# Patient Record
Sex: FEMALE | Race: WHITE | Employment: FULL TIME | URBAN - METROPOLITAN AREA
[De-identification: names, ages, dates, MRNs, and addresses within clinical notes are randomized per-mention and may not be internally consistent; named-entity substitution may affect disease eponyms.]

---

## 2017-01-05 ENCOUNTER — VIRTUAL VISIT (OUTPATIENT)
Dept: FAMILY MEDICINE | Facility: OTHER | Age: 37
End: 2017-01-05

## 2017-01-05 ENCOUNTER — MYC REFILL (OUTPATIENT)
Dept: OBGYN | Facility: CLINIC | Age: 37
End: 2017-01-05

## 2017-01-05 ENCOUNTER — TELEPHONE (OUTPATIENT)
Dept: FAMILY MEDICINE | Facility: CLINIC | Age: 37
End: 2017-01-05

## 2017-01-05 DIAGNOSIS — N39.0 POSTCOITAL UTI: ICD-10-CM

## 2017-01-05 DIAGNOSIS — N30.00 ACUTE CYSTITIS WITHOUT HEMATURIA: Primary | ICD-10-CM

## 2017-01-05 RX ORDER — CEPHALEXIN 500 MG/1
500 CAPSULE ORAL 2 TIMES DAILY
Qty: 14 CAPSULE | Refills: 0 | Status: SHIPPED | OUTPATIENT
Start: 2017-01-05 | End: 2017-01-12

## 2017-01-05 RX ORDER — NITROFURANTOIN MACROCRYSTALS 50 MG/1
CAPSULE ORAL
Qty: 30 CAPSULE | Status: SHIPPED | OUTPATIENT
Start: 2017-01-05 | End: 2017-11-22

## 2017-01-05 NOTE — TELEPHONE ENCOUNTER
Patient is trying to get pregnant. She was prescribed bactrim this morning for a UTI. She is wondering if this is safe for her to take or should she be using something different. Please advise. Thank you.  Nikki Case RN

## 2017-01-05 NOTE — TELEPHONE ENCOUNTER
Sharlene and  are trying to get pregnant.  She currently has UTI and went on zipnosis this morning.  They prescribed her bactrim, but pharmacy didn't think that would be a good medication while she is trying to get pregnant.  Please call and assess. Thank you..Joslyn Machado

## 2017-01-05 NOTE — TELEPHONE ENCOUNTER
Message from [employer]:  Original authorizing provider: MD Sharlene Cerna would like a refill of the following medications:  nitrofurantoin (MACRODANTIN) 50 MG capsule [Anna Saldaña MD]    Preferred pharmacy: [facility], [place] - [address]    Comment:  I ran out of my prior prescription and still prone to UTI's, so it would be great to get a preventative.

## 2017-01-05 NOTE — PROGRESS NOTES
"Date:   Clinician: [facility]  Clinician NPI: [id number]  Patient: Sharlene Natarajan  Patient : 1980  Patient Address: [address] [place] [address]  Patient Phone: [phone]  Visit Protocol: UTI  Patient Summary:  Sharlene is a 36 year old ( : 1980 ) female who initiated a Zip for a presumed bladder infection. When asked the question \"Do you have a [place] primary care physician?\", Sharlene responded \"Yes\".    Her symptoms began 2 days ago and consist of dysuria, hesitation, urinary frequency, and urgency.   Symptom Details   Urinary Frequency: Several times each hour    She denies urinary incontinence, fever, chills, loss of appetite, nausea, vomiting, abdominal pain, recent antibiotic use, foul smelling urine, hematuria, vaginal discharge, and flank pain. Sharlene has never had kidney stones. She has not been hospitalized, been a patient in a nursing home, or had a catheter in the past two weeks. She denies risk factors for sexually transmitted infections.   Sharlene has not had any UTIs in the past 12 months. Her current symptoms are similar to the previous UTI symptoms. She took an antibiotic for her last infection but does not remember which one.    Sharlene typically gets yeast infections when she takes antibiotics.  She states she is not pregnant and denies breastfeeding. She has menstruated in the past month.   She does NOT smoke or use smokeless tobacco.  MEDICATIONS:  No current medications   , ALLERGIES:   penicillin/amoxicillin/augmentin    Clinician Response:  Dear Sharlene,  Based on the information you have provided, you likely have a bladder infection, also called an acute urinary tract infection (UTI).   To treat your infection, I am prescribing:   Bactrim DS. Swallow one (1) tablet twice a day for 3 days to treat your bladder infection. Continue taking the tablets even if you feel better before all the medication is gone. There is no " refill with this prescription.   Some people develop allergies to antibiotics. If you notice a new rash, significant swelling, or difficulty breathing, stop the medication immediately and go into a clinic for physical evaluation.   To help treat your current UTI and prevent future occurrences, remember to:     Drink 8-10, 8-ounce glasses of water daily.    Urinate after sexual intercourse.    Wipe front to back after using the bathroom.     Some women may develop a yeast infection as a side effect of taking antibiotics. Because you noted that you typically get yeast infections when you are taking antibiotics, I am also prescribing:   Fluconazole (Diflucan) 150 mg oral tablet. Take this medication only if you notice symptoms of a yeast infection (vaginal discharge that is white, thick, and odorless). There are no refills with this prescription.   You should visit a clinic for a follow-up visit if your symptoms do not improve in 1-2 days or if you experience another urinary tract infection soon after completing this treatment.  If you become pregnant during this course of treatment, stop taking the medication and contact your primary care clinician.   Diagnosis: Acute Uncomplicated Bladder Infection  Diagnosis ICD: N39.0  Prescription: sulfamethoxazole-TMP DS (Bactrim DS) 800-160mg oral tablet 6 tablets, 3 days supply. Take one tablet by mouth two times a day for 3 days. Refills: 0, Refill as needed: no, Allow substitutions: yes  Prescription: fluconazole (Diflucan) 150mg oral tablet 1 tablet, 1 days supply. Take one tablet by mouth one time a day for 1 day. Refills: 0, Refill as needed: no, Allow substitutions: yes  Prescription Sent At: January 05 07:29:22, 2017  Pharmacy: [facility] [place] - [phone] - [address], [place] [address]

## 2017-01-05 NOTE — TELEPHONE ENCOUNTER
If there is the possibility that she could be pregnant than I would avoid bactrim and use something like keflex. However it will be hard to know what is the most effective antbiotic given we will not have a urine sample or culture.   I sent in a script for keflex to use in place of bactrim. However it is very important that if symptoms do not seem to improve she come to clinic to be seen so that we can collect a urine and make sure we are treating it with the correct medications  Tawanna

## 2017-01-10 ENCOUNTER — OFFICE VISIT (OUTPATIENT)
Dept: FAMILY MEDICINE | Facility: CLINIC | Age: 37
End: 2017-01-10
Payer: COMMERCIAL

## 2017-01-10 VITALS
SYSTOLIC BLOOD PRESSURE: 129 MMHG | WEIGHT: 165 LBS | DIASTOLIC BLOOD PRESSURE: 74 MMHG | HEIGHT: 66 IN | HEART RATE: 82 BPM | BODY MASS INDEX: 26.52 KG/M2 | TEMPERATURE: 99.1 F

## 2017-01-10 DIAGNOSIS — Z13.220 NEED FOR LIPID SCREENING: ICD-10-CM

## 2017-01-10 DIAGNOSIS — G57.61 MORTON'S NEURALGIA, RIGHT: ICD-10-CM

## 2017-01-10 DIAGNOSIS — N30.00 ACUTE CYSTITIS WITHOUT HEMATURIA: ICD-10-CM

## 2017-01-10 DIAGNOSIS — R10.32 LLQ ABDOMINAL PAIN: Primary | ICD-10-CM

## 2017-01-10 LAB
ALBUMIN SERPL-MCNC: 4 G/DL (ref 3.4–5)
ALBUMIN UR-MCNC: NEGATIVE MG/DL
ALP SERPL-CCNC: 60 U/L (ref 40–150)
ALT SERPL W P-5'-P-CCNC: 20 U/L (ref 0–50)
ANION GAP SERPL CALCULATED.3IONS-SCNC: 9 MMOL/L (ref 3–14)
APPEARANCE UR: CLEAR
AST SERPL W P-5'-P-CCNC: 14 U/L (ref 0–45)
BACTERIA #/AREA URNS HPF: ABNORMAL /HPF
BASOPHILS # BLD AUTO: 0 10E9/L (ref 0–0.2)
BASOPHILS NFR BLD AUTO: 0.5 %
BETA HCG QUAL IFA URINE: NEGATIVE
BILIRUB SERPL-MCNC: 0.4 MG/DL (ref 0.2–1.3)
BILIRUB UR QL STRIP: NEGATIVE
BUN SERPL-MCNC: 11 MG/DL (ref 7–30)
CALCIUM SERPL-MCNC: 8.9 MG/DL (ref 8.5–10.1)
CHLORIDE SERPL-SCNC: 100 MMOL/L (ref 94–109)
CO2 SERPL-SCNC: 25 MMOL/L (ref 20–32)
COLOR UR AUTO: YELLOW
CREAT SERPL-MCNC: 0.7 MG/DL (ref 0.52–1.04)
DIFFERENTIAL METHOD BLD: NORMAL
EOSINOPHIL # BLD AUTO: 0 10E9/L (ref 0–0.7)
EOSINOPHIL NFR BLD AUTO: 0.5 %
ERYTHROCYTE [DISTWIDTH] IN BLOOD BY AUTOMATED COUNT: 12.6 % (ref 10–15)
GFR SERPL CREATININE-BSD FRML MDRD: NORMAL ML/MIN/1.7M2
GLUCOSE SERPL-MCNC: 95 MG/DL (ref 70–99)
GLUCOSE UR STRIP-MCNC: NEGATIVE MG/DL
HCT VFR BLD AUTO: 38.2 % (ref 35–47)
HGB BLD-MCNC: 12.8 G/DL (ref 11.7–15.7)
HGB UR QL STRIP: ABNORMAL
KETONES UR STRIP-MCNC: NEGATIVE MG/DL
LEUKOCYTE ESTERASE UR QL STRIP: NEGATIVE
LIPASE SERPL-CCNC: 148 U/L (ref 73–393)
LYMPHOCYTES # BLD AUTO: 2.3 10E9/L (ref 0.8–5.3)
LYMPHOCYTES NFR BLD AUTO: 30.6 %
MCH RBC QN AUTO: 28.8 PG (ref 26.5–33)
MCHC RBC AUTO-ENTMCNC: 33.5 G/DL (ref 31.5–36.5)
MCV RBC AUTO: 86 FL (ref 78–100)
MONOCYTES # BLD AUTO: 0.6 10E9/L (ref 0–1.3)
MONOCYTES NFR BLD AUTO: 7.7 %
NEUTROPHILS # BLD AUTO: 4.6 10E9/L (ref 1.6–8.3)
NEUTROPHILS NFR BLD AUTO: 60.7 %
NITRATE UR QL: NEGATIVE
NON-SQ EPI CELLS #/AREA URNS LPF: ABNORMAL /LPF
PH UR STRIP: 6.5 PH (ref 5–7)
PLATELET # BLD AUTO: 246 10E9/L (ref 150–450)
POTASSIUM SERPL-SCNC: 4 MMOL/L (ref 3.4–5.3)
PROT SERPL-MCNC: 7.7 G/DL (ref 6.8–8.8)
RBC # BLD AUTO: 4.45 10E12/L (ref 3.8–5.2)
RBC #/AREA URNS AUTO: ABNORMAL /HPF (ref 0–2)
SODIUM SERPL-SCNC: 134 MMOL/L (ref 133–144)
SP GR UR STRIP: 1.01 (ref 1–1.03)
URN SPEC COLLECT METH UR: ABNORMAL
UROBILINOGEN UR STRIP-ACNC: 0.2 EU/DL (ref 0.2–1)
WBC # BLD AUTO: 7.6 10E9/L (ref 4–11)
WBC #/AREA URNS AUTO: ABNORMAL /HPF (ref 0–2)

## 2017-01-10 PROCEDURE — 80053 COMPREHEN METABOLIC PANEL: CPT | Performed by: FAMILY MEDICINE

## 2017-01-10 PROCEDURE — 99214 OFFICE O/P EST MOD 30 MIN: CPT | Performed by: FAMILY MEDICINE

## 2017-01-10 PROCEDURE — 85025 COMPLETE CBC W/AUTO DIFF WBC: CPT | Performed by: FAMILY MEDICINE

## 2017-01-10 PROCEDURE — 36415 COLL VENOUS BLD VENIPUNCTURE: CPT | Performed by: FAMILY MEDICINE

## 2017-01-10 PROCEDURE — 81001 URINALYSIS AUTO W/SCOPE: CPT | Performed by: FAMILY MEDICINE

## 2017-01-10 PROCEDURE — 84703 CHORIONIC GONADOTROPIN ASSAY: CPT | Performed by: FAMILY MEDICINE

## 2017-01-10 PROCEDURE — 83690 ASSAY OF LIPASE: CPT | Performed by: FAMILY MEDICINE

## 2017-01-10 NOTE — NURSING NOTE
"Chief Complaint   Patient presents with     Abdominal Pain       Initial /74 mmHg  Pulse 82  Temp(Src) 99.1  F (37.3  C) (Tympanic)  Ht 5' 6.25\" (1.683 m)  Wt 165 lb (74.844 kg)  BMI 26.42 kg/m2 Estimated body mass index is 26.42 kg/(m^2) as calculated from the following:    Height as of this encounter: 5' 6.25\" (1.683 m).    Weight as of this encounter: 165 lb (74.844 kg).  BP completed using cuff size: lucie Pacheco LPN    "

## 2017-01-10 NOTE — LETTER
[facility]  [address] [facility] [address]  [phone]        January 15, 2018    Sharlene Natarajan  [address]  [facility] [address]              Dear Sharlene Natarajan    This is to remind you that your FASTING LAB is due.    You may call our office at [phone] to schedule an appointment.    Please disregard this notice if you have already had your labs drawn or made an appointment.        Sincerely,        Jackie Olivo MD

## 2017-01-10 NOTE — MR AVS SNAPSHOT
After Visit Summary   1/10/2017    Sharlene Natarajan    MRN: [id number]           Patient Information     Date Of Birth          1980        Visit Information        Provider Department      1/10/2017 9:30 AM aJckie Olivo MD [facility]        Today's Diagnoses     LLQ abdominal pain    -  1     Acute cystitis without hematuria         Need for lipid screening         Babb's neuralgia, right           Care Instructions      What Are Neuromas of the Foot?  The ball of your foot is the bottom part just behind your toes. Bands of tissue (ligaments) connect the bones in the ball of your foot. Nerves run between the bones and underneath the ligaments. When a nerve becomes pinched, this causes it to swell and become painful due to the thickening of the tissue that surrounds the nerve. The painful, swollen nerve is called a neuroma (also called Babb's neuroma).     A neuroma most often occurs at the base of either the third and fourth toes or the second and third toes.   What causes a neuroma?  Wearing tight or high-heeled shoes can cause a neuroma. Shoes that are too narrow or too pointed squeeze the bones in the ball of the foot. Shoes with high heels put extra pressure on the ends of the bones. When the bones are squeezed together, they pinch the nerve that runs between them.  Symptoms  The most common symptom of a neuroma is pain in the ball of the foot between two toes. The pain may be dull or sharp. It may feel as if you have a stone in your shoe. You may also have tingling or numbness in one or both of the toes. Symptoms may occur after you have been walking or standing for a while. Taking off your shoes and rubbing the ball of your foot may decrease or relieve the pain.  Preventing future problems  To prevent a future neuroma, buy shoes with plenty of room across the ball of the foot and in the toes. This keeps the bones from being squeezed together. Wearing low-heeled  shoes (less than 2 inches) also puts less pressure on the bones and nerves in the ball of the foot.     5589-6756 [employer]. [address] [place] [address], [place] [address]. All rights reserved. This information is not intended as a substitute for professional medical care. Always follow your healthcare professional's instructions.              Follow-ups after your visit        Additional Services     ORTHO  REFERRAL       [facility] is referring you to the Orthopedic  Services at [facility].       The  Representative will assist you in the coordination of your Orthopedic and Musculoskeletal Care as prescribed by your physician.    The  Representative will call you within 1 business day to help schedule your appointment, or you may contact the  Representative at:    All areas ~ [phone]     Type of Referral : [facility] / [facility]       Timeframe requested: Routine    Coverage of these services is subject to the terms and limitations of your health insurance plan.  Please call member services at your health plan with any benefit or coverage questions.      If X-rays, CT or MRI's have been performed, please contact the facility where they were done to arrange for , prior to your scheduled appointment.  Please bring this referral request to your appointment and present it to your specialist.                  Future tests that were ordered for you today     Open Future Orders        Priority Expected Expires Ordered    US Abdomen Complete Routine  1/10/2018 1/10/2017    Lipid panel reflex to direct LDL Routine  1/10/2018 1/10/2017            Who to contact     Normal or non-critical lab and imaging results will be communicated to you by MyChart, letter or phone within 4 business days after the clinic has received the results. If you do not hear from us within 7 days, please contact the clinic  "through [employer] or phone. If you have a critical or abnormal lab result, we will notify you by phone as soon as possible.  Submit refill requests through [employer] or call your pharmacy and they will forward the refill request to us. Please allow 3 business days for your refill to be completed.          If you need to speak with a  for additional information , please call: [phone]             Additional Information About Your Visit        [employer] Information     [employer] gives you secure access to your electronic health record. If you see a primary care provider, you can also send messages to your care team and make appointments. If you have questions, please call your primary care clinic.  If you do not have a primary care provider, please call [phone] and they will assist you.        Care EveryWhere ID     This is your Care EveryWhere ID. This could be used by other organizations to access your [facility] medical records  [id number]        Your Vitals Were     Pulse Temperature Height BMI (Body Mass Index)          82 99.1  F (37.3  C) (Tympanic) 5' 6.25\" (1.683 m) 26.42 kg/m2         Blood Pressure from Last 3 Encounters:   01/10/17 129/74   11/11/15 132/75   10/15/15 129/81    Weight from Last 3 Encounters:   01/10/17 165 lb (74.844 kg)   11/11/15 159 lb (72.122 kg)   10/15/15 157 lb (71.215 kg)              We Performed the Following     *UA reflex to Microscopic and Culture ([facility] and [facility] (except [place] and [place])     Beta HCG qual IFA urine     CBC with platelets differential     Comprehensive metabolic panel (BMP + Alb, Alk Phos, ALT, AST, Total. Bili, TP)     Lipase     ORTHO  REFERRAL     Urine Microscopic        Primary Care Provider Office Phone # Fax #    Jackie Olivo [phone]       [facility] [address] [address]        Thank you!     Thank you for choosing [facility]  for " your care. Our goal is always to provide you with excellent care. Hearing back from our patients is one way we can continue to improve our services. Please take a few minutes to complete the written survey that you may receive in the mail after your visit with us. Thank you!             Your Updated Medication List - Protect others around you: Learn how to safely use, store and throw away your medicines at www.disposemymeds.org.          This list is accurate as of: 1/10/17 10:57 AM.  Always use your most recent med list.                   Brand Name Dispense Instructions for use    cephALEXin 500 MG capsule    KEFLEX    14 capsule    Take 1 capsule (500 mg) by mouth 2 times daily for 7 days       chorionic gonadotropin 54606 UNITS IM SOLR    NOVAREL/PREGNYL/PROFASI    76121 Units    Inject 10,000 Units into the muscle once       clomiPHENE 50 MG tablet    CLOMID    5 tablet    Take 2 tablets (100 mg) by mouth daily Day 3-7       ibuprofen 400 MG tablet    ADVIL/MOTRIN    60 tablet    Take 1-2 tablets (400-800 mg) by mouth every 6 hours as needed (cramping.  Begin after ketorolac doses completed.  Max of 3200mg/day)       nitrofurantoin 50 MG capsule    MACRODANTIN    30 capsule    Take 1 capsule after sexual relaitons       Prenat Vit-FePoly-Methylfol-FA 29-1.13-0.4 MG Tabs      Take  by mouth.       PROBIOTIC + OMEGA-3 PO      One day       progesterone 200 MG capsule    PROMETRIUM    60 capsule    Place 1 capsule (200 mg) vaginally 2 times daily

## 2017-01-10 NOTE — PATIENT INSTRUCTIONS
What Are Neuromas of the Foot?  The ball of your foot is the bottom part just behind your toes. Bands of tissue (ligaments) connect the bones in the ball of your foot. Nerves run between the bones and underneath the ligaments. When a nerve becomes pinched, this causes it to swell and become painful due to the thickening of the tissue that surrounds the nerve. The painful, swollen nerve is called a neuroma (also called Babb's neuroma).     A neuroma most often occurs at the base of either the third and fourth toes or the second and third toes.   What causes a neuroma?  Wearing tight or high-heeled shoes can cause a neuroma. Shoes that are too narrow or too pointed squeeze the bones in the ball of the foot. Shoes with high heels put extra pressure on the ends of the bones. When the bones are squeezed together, they pinch the nerve that runs between them.  Symptoms  The most common symptom of a neuroma is pain in the ball of the foot between two toes. The pain may be dull or sharp. It may feel as if you have a stone in your shoe. You may also have tingling or numbness in one or both of the toes. Symptoms may occur after you have been walking or standing for a while. Taking off your shoes and rubbing the ball of your foot may decrease or relieve the pain.  Preventing future problems  To prevent a future neuroma, buy shoes with plenty of room across the ball of the foot and in the toes. This keeps the bones from being squeezed together. Wearing low-heeled shoes (less than 2 inches) also puts less pressure on the bones and nerves in the ball of the foot.     9218-1067 [employer]. [address] [place] [address], [place] [address]. All rights reserved. This information is not intended as a substitute for professional medical care. Always follow your healthcare professional's instructions.

## 2017-01-10 NOTE — PROGRESS NOTES
SUBJECTIVE:                                                    Sharlene Natarajan is a 36 year old female who presents to clinic today for the following health issues:      ABDOMINAL PAIN     Onset: Friday  Night     Description:   Character: Sharp, tender to the touch   Location: left side, midway down on abdomen quadrant  Radiation: None    Intensity: moderate    Progression of Symptoms:  improving and intermittent    Accompanying Signs & Symptoms:  Fever/Chills?: no   Gas/Bloating: YES  Nausea: no   Vomitting: no   Diarrhea?: no   Constipation:no   Dysuria or Hematuria: no    History:   Trauma: no   Previous similar pain: no    Previous tests done: none    Precipitating factors:   Does the pain change with:     Food: no      BM: no     Urination: no     Alleviating factors:      Therapies Tried and outcome:     LMP:  12/15/2016         Diagnosed with UTI last week using zipnosis - finshing antibiotics     Trying to get pregnant        UTI - zipnosis last week - taking keflex   Had typical uti symptoms, they improved and she is feeling better   Now getting a yeast infection -itchy,whitish discharge -  will do vaginal monistat     4 days ago -  random left lower abdominal pains, very sharp and lasted 30seconds  Hurts to press on it   No history of kidney stones   Happening hourly this weekend, not waking her from sleep  Yesterday - 6 times, today - once   No diarrhea  Not related to eating     Feeling a little bloated    Trying to get pregnant - ovulated last week     Numbness in the right 3/4 toes. No other areas of numbness  Ongoing for 6 months.   Doesn't feel it when she wears supportive shoes       Review of systems:  No f/c   No cold symptoms  Normal appetite  No n/v   No rash   No myalgias/arthralgias     Problem list and histories reviewed & adjusted, as indicated.  Additional history: as documented    Patient Active Problem List   Diagnosis     Female infertility     CARDIOVASCULAR SCREENING; LDL GOAL  "LESS THAN 130     Current Outpatient Prescriptions   Medication     nitrofurantoin (MACRODANTIN) 50 MG capsule     cephALEXin (KEFLEX) 500 MG capsule     chorionic gonadotropin (NOVAREL/PREGNYL/PROFASI) 56145 UNITS IM SOLR     Probiotic Product (PROBIOTIC + OMEGA-3 PO)     ibuprofen (ADVIL,MOTRIN) 400 MG tablet     Prenat Vit-FePoly-Methylfol-FA 29-1.13-0.4 MG TABS     clomiPHENE (CLOMID) 50 MG tablet     progesterone (PROMETRIUM) 200 MG capsule     No current facility-administered medications for this visit.        Allergies   Allergen Reactions     Penicillins Hives       /74 mmHg  Pulse 82  Temp(Src) 99.1  F (37.3  C) (Tympanic)  Ht 5' 6.25\" (1.683 m)  Wt 165 lb (74.844 kg)  BMI 26.42 kg/m2  GENERAL - Pt is alert and oriented in no acute distress.  Affect is appropriate. Good eye contact.  NECK - Neck is supple w/o LA or thyromegaly  RESPIRATORY - Clear to auscultation bilaterally.  No wheezing noted  CV - RRR, no murmurs, rubs, gallops.   ABD - +BS, soft, tender to palpation in the left abdomen, just under the lower edge of the rib cage, no rebound, no guarding. No palpable organomegaly.  EXTREM - No edema.    Results for orders placed or performed in visit on 01/10/17   *UA reflex to Microscopic and Culture ([facility] (except [place] and [place])   Result Value Ref Range    Color Urine Yellow     Appearance Urine Clear     Glucose Urine Negative NEG mg/dL    Bilirubin Urine Negative NEG    Ketones Urine Negative NEG mg/dL    Specific Gravity Urine 1.015 1.003 - 1.035    Blood Urine Trace (A) NEG    pH Urine 6.5 5.0 - 7.0 pH    Protein Albumin Urine Negative NEG mg/dL    Urobilinogen Urine 0.2 0.2 - 1.0 EU/dL    Nitrite Urine Negative NEG    Leukocyte Esterase Urine Negative NEG    Source Midstream Urine    Beta HCG qual IFA urine   Result Value Ref Range    Beta HCG Qual IFA Urine Negative NEG   Urine Microscopic   Result Value Ref Range    WBC Urine O - 2 0 - 2 /HPF "    RBC Urine O - 2 0 - 2 /HPF    Squamous Epithelial /LPF Urine Few FEW /LPF    Bacteria Urine Few (A) NEG /HPF   CBC with platelets differential   Result Value Ref Range    WBC 7.6 4.0 - 11.0 10e9/L    RBC Count 4.45 3.8 - 5.2 10e12/L    Hemoglobin 12.8 11.7 - 15.7 g/dL    Hematocrit 38.2 35.0 - 47.0 %    MCV 86 78 - 100 fl    MCH 28.8 26.5 - 33.0 pg    MCHC 33.5 31.5 - 36.5 g/dL    RDW 12.6 10.0 - 15.0 %    Platelet Count 246 150 - 450 10e9/L    Diff Method Automated Method     % Neutrophils 60.7 %    % Lymphocytes 30.6 %    % Monocytes 7.7 %    % Eosinophils 0.5 %    % Basophils 0.5 %    Absolute Neutrophil 4.6 1.6 - 8.3 10e9/L    Absolute Lymphocytes 2.3 0.8 - 5.3 10e9/L    Absolute Monocytes 0.6 0.0 - 1.3 10e9/L    Absolute Eosinophils 0.0 0.0 - 0.7 10e9/L    Absolute Basophils 0.0 0.0 - 0.2 10e9/L         Assessment/Plan -    (R10.32) LLQ abdominal pain  (primary encounter diagnosis)  Comment: Symptoms seem to be improving. No red flag symptoms. No GI symptoms. She will monitor for a few more days.   If symptoms persist/worsen, will schedule ultrasound. We decided to do abd labs today. The patient indicates understanding of these issues and agrees with the plan.   Plan: *UA reflex to Microscopic and Culture         ([facility] (except         [place] and [place]), Beta HCG qual IFA         urine, US Abdomen Complete, Urine Microscopic,         Comprehensive metabolic panel (BMP + Alb, Alk         Phos, ALT, AST, Total. Bili, TP), Lipase, CBC         with platelets differential            (N30.00) Acute cystitis without hematuria  Comment: ua seems to be resolved.   Plan: *UA reflex to Microscopic and Culture         ([facility] (except         [place] and [place]), Beta HCG qual IFA         urine            (Z13.220) Need for lipid screening  Comment:   Plan: Lipid panel reflex to direct LDL            (G57.61) Babb's neuralgia, right  Comment:  discussed diagnosis and treatment. She will try ice/metatarsal pad/supportive shoes. If symptoms persist, podiatry referral given.   Plan: ORTHO  REFERRAL            NORMA Olivo MD

## 2017-01-12 ENCOUNTER — TELEPHONE (OUTPATIENT)
Dept: FAMILY MEDICINE | Facility: CLINIC | Age: 37
End: 2017-01-12

## 2017-01-12 NOTE — TELEPHONE ENCOUNTER
I'm so sorry to hear that.  Her labs were all normal and I just sent her a message through [employer] with them.      I would suggest that she start taking a daily PPI - prilosec is over the counter - and see if that makes any difference.  Also, the ultrasound order is in so she can schedule it.     I can also place labs for stool testing, if she would like.    NORMA Olivo MD

## 2017-01-12 NOTE — TELEPHONE ENCOUNTER
Patient notified. She will try prilosec and will schedule US. Patient will think about labs for stool.  Nikki Case RN

## 2017-01-12 NOTE — TELEPHONE ENCOUNTER
Patient called and would like to know the results of her blood work that was done on 1/10/2017.  Patient reports she still having symptoms.    Sarah Beth Gonzalez, [place]

## 2017-01-12 NOTE — TELEPHONE ENCOUNTER
Patient was seen in clinic on 1/10/17. She is looking for her lab results. Also, she wanted to update you that the pain in her abdomen is back and persistent. It comes on at random times. Mostly happens after eating. She also get diarrhea after eating too. Yesterday she had 5 episodes of diarrhea, today x3. She still has slight nausea or uneasy belly and no appetite. Please review labs and advise what to do.  Nikki Case RN

## 2017-02-09 ENCOUNTER — OFFICE VISIT (OUTPATIENT)
Dept: PODIATRY | Facility: CLINIC | Age: 37
End: 2017-02-09
Payer: COMMERCIAL

## 2017-02-09 VITALS — BODY MASS INDEX: 26.52 KG/M2 | WEIGHT: 165 LBS | HEIGHT: 66 IN

## 2017-02-09 DIAGNOSIS — G57.61 MORTON NEUROMA, RIGHT: ICD-10-CM

## 2017-02-09 DIAGNOSIS — M79.671 RIGHT FOOT PAIN: Primary | ICD-10-CM

## 2017-02-09 PROCEDURE — 99203 OFFICE O/P NEW LOW 30 MIN: CPT | Performed by: PODIATRIST

## 2017-02-09 NOTE — MR AVS SNAPSHOT
After Visit Summary   2/9/2017    Sharlene Natarajan    MRN: [id number]           Patient Information     Date Of Birth          1980        Visit Information        Provider Department      2/9/2017 10:30 AM Bhupendra Arauz DPM [facility] [place]        Today's Diagnoses     Right foot pain    -  1    Gray neuroma, right          Care Instructions    GRAY'S NEUROMA   What is a Gray's Neuroma?   Gray's neuroma is an enlargement or thickening of a nerve in the foot. It is also sometimes referred to as an intermetatarsal neuroma, interdigital neuroma, Gray's metatarsalgia (pain in the metatarsal head area), steve-neural fibrosis (scar tissue around a nerve) or entrapment neuropathy (abnormal nerve due to compression). A Gray's neuroma most commonly occurs in the third interspace between the third and fourth toes, followed by the second interspace between the second and third toes. Gray's neuromas have also occurred in the fourth and first interspaces, but these are rare. If you have a Gray's neuroma, there is a 15% chance it will occur bilaterally (on both feet). Gray's neuromas occur most commonly in women who are between 30 to 50 years old. The reason they are more common in women is thought to be due to the shoes women wear.   What Causes a Gray's Neuroma?   A Gray's neuroma is thought to be caused by trauma to the nerve, but scientists are still not sure about the exact cause of the trauma. The trauma may be caused by the metatarsal heads, the deep transverse intermetatarsal ligament (holds the metatarsal heads together) or an intermetatarsal bursa (fluid-filled sac). All of these structures can cause compression/trauma on the nerve which initially causes swelling and injury in the nerve. Over time if the compression/trauma continues, the nerve repairs itself with very fibrous tissue that leads to enlargement and thickening of the nerve. Other  "causes of trauma to the nerve may include; overpronation (foot rolls inward), hypermobility (too much motion), cavo varus (high arch foot) and excessive dorsiflexion (toes bend upward) of the toes. These biomechanical (howthe foot moves) factors may cause trauma to the nerve with every step. If the nerve becomes irritated and enlarged then it takes up more space and gets even more compressed and irritated. It becomes a vicious cycle.   Signs & Symptoms   - Pain (sharp, stabbing, throbbing, shooting)    - Numbness    - Tingling or \"pins & needles\"    - Burning    - Cramping    - A feeling that you are stepping on something or that something is in your shoe    - Initially the symptoms may happen once in a while, but as the condition gets     worse, the symptoms may happen all of the time    - It usually feels better by taking off your shoe and massaging your foot     Diagnosis/Tests (Exam): Your podiatrist will ask many questions about your signs and symptoms and will perform a physical exam. Some of the exams may include a web space compression test. This is done by squeezing the metatarsals together with one hand and using the thumb and index finger of the other hand to compress the affected web space to reproduce the pain/symptoms. A palpable click (Victoriano's click) is usually present. This test may also cause pain to shoot into the toes and that is called a Tinel's sign. Shaylee's test involves squeezing the metatarsals together and moving the toes up and down for 30 seconds. This will usually cause pain or it will bring on your other symptoms. Molina's sign is positive when you stand and the affected toes spread apart. A Babb's neuroma is usually diagnosed based on the history and physical exam findings, but sometimes other tests such as an x-ray, ultrasound or an MRI are needed.   Treatment  1.  Footwear changes: Wear shoes that are wide and deep in the toe box so they  do not put pressure on your toes and " metatarsals. Avoid wearing high heels because they cause increased pressure on the ball of your foot (forefoot).    2.  Metatarsal pads: These help to lift and separate the metatarsal heads to take pressure off of the nerve. They are placed just behind where you feel the pain, not on top of the painful spot.   3.  Activity modification: For example, you may try swimming instead of running until your symptoms go away.   4.  Taping   5.  Icing   6.  NSAIDs (anti-inflammatories): aleve, ibuprofen, etc.   7.  Arch supports or orthotics: These help to control some of the abnormal motion in your feet. The abnormal motion can lead to extra torque and pressure on the nerve.   8.  Physical Therapy  9.  Cortisone injection: Helps to decrease the size of the irritated, enlarged nerve.   10.  Sclerosing Alcohol injection: Helps to destroy the nerve chemically. Does cause permanent numbness  11.  Surgery: If conservative treatment does not help surgery may be needed. Surgery may involve cutting out the nerve or cutting the intermetatarsalligament. Studies have shown surgery has an 80-85% success rate.  Will result in numbness     Prevention   -Avoid wearing narrow, pointed toe shoes   -Avoid wearing high heel shoes           Follow-ups after your visit        Follow-up notes from your care team     Return in about 4 weeks (around 3/9/2017), or if symptoms worsen or fail to improve.      Who to contact     If you have questions or need follow up information about today's clinic visit or your schedule please contact [facility] directly at [phone].  Normal or non-critical lab and imaging results will be communicated to you by MyChart, letter or phone within 4 business days after the clinic has received the results. If you do not hear from us within 7 days, please contact the clinic through MyChart or phone. If you have a critical or abnormal lab result, we will notify you by phone as soon as  "possible.  Submit refill requests through [employer] or call your pharmacy and they will forward the refill request to us. Please allow 3 business days for your refill to be completed.          Additional Information About Your Visit        [employer] Information     [employer] gives you secure access to your electronic health record. If you see a primary care provider, you can also send messages to your care team and make appointments. If you have questions, please call your primary care clinic.  If you do not have a primary care provider, please call [phone] and they will assist you.        Care EveryWhere ID     This is your Care EveryWhere ID. This could be used by other organizations to access your [facility] records  [id number]        Your Vitals Were     Height BMI (Body Mass Index)                1.683 m (5' 6.25\") 26.43 kg/m2           Blood Pressure from Last 3 Encounters:   01/10/17 129/74   11/11/15 132/75   10/15/15 129/81    Weight from Last 3 Encounters:   02/09/17 74.8 kg (165 lb)   01/10/17 74.8 kg (165 lb)   11/11/15 72.1 kg (159 lb)              Today, you had the following     No orders found for display       Primary Care Provider Office Phone # Fax #    Jackie Olivo [phone]       [facility] [address] [facility] [address]        Thank you!     Thank you for choosing [facility]  for your care. Our goal is always to provide you with excellent care. Hearing back from our patients is one way we can continue to improve our services. Please take a few minutes to complete the written survey that you may receive in the mail after your visit with us. Thank you!             Your Updated Medication List - Protect others around you: Learn how to safely use, store and throw away your medicines at www.disposemymeds.org.          This list is accurate as of: 2/9/17 11:59 PM.  Always use your most recent med list.                "    Brand Name Dispense Instructions for use    chorionic gonadotropin 29158 UNITS IM SOLR    NOVAREL/PREGNYL/PROFASI    98566 Units    Inject 10,000 Units into the muscle once       clomiPHENE 50 MG tablet    CLOMID    5 tablet    Take 2 tablets (100 mg) by mouth daily Day 3-7       ibuprofen 400 MG tablet    ADVIL/MOTRIN    60 tablet    Take 1-2 tablets (400-800 mg) by mouth every 6 hours as needed (cramping.  Begin after ketorolac doses completed.  Max of 3200mg/day)       nitrofurantoin 50 MG capsule    MACRODANTIN    30 capsule    Take 1 capsule after sexual relaitons       Prenat Vit-FePoly-Methylfol-FA 29-1.13-0.4 MG Tabs      Take  by mouth.       PROBIOTIC + OMEGA-3 PO      One day       progesterone 200 MG capsule    PROMETRIUM    60 capsule    Place 1 capsule (200 mg) vaginally 2 times daily

## 2017-02-09 NOTE — PROGRESS NOTES
PATIENT HISTORY:  Sharlene Natarajan is a 36 year old female who presents to clinic for a painful right foot .  The patient describes the pain as numbness and sharp pain.  The patient relates pain is located through her toes and in the ball of her foot. She now also gets pain through the top of her arch and along the lateral side of her foot.  The patient relates the pain has been present for the past 7-8 months.  The patient relates pain with ambulation.  The patient has tried wearing supportive shoes, advil, and icing with little relief.  The patient was sent by  for consultation on the right foot.       REVIEW OF SYSTEMS:  Constitutional, HEENT, cardiovascular, pulmonary, GI, , musculoskeletal, neuro, skin, endocrine and psych systems are negative, except as otherwise noted.     PAST MEDICAL HISTORY:   Past Medical History   Diagnosis Date     Anxiety      Chickenpox      Chlamydia      Depression      Infertility      Mild preeclampsia         PAST SURGICAL HISTORY:   Past Surgical History   Procedure Laterality Date      section  2011     Procedure: SECTION;  Section; Surgeon:ABELINO CABAN; Location:[facility] OR      section  2014     Procedure:  SECTION;   Section;  Surgeon: Abelino Caban MD;  Location: [facility] OR        MEDICATIONS:   Current Outpatient Prescriptions:      nitrofurantoin (MACRODANTIN) 50 MG capsule, Take 1 capsule after sexual relaitons, Disp: 30 capsule, Rfl: prn     clomiPHENE (CLOMID) 50 MG tablet, Take 2 tablets (100 mg) by mouth daily Day 3-7, Disp: 5 tablet, Rfl: 3     progesterone (PROMETRIUM) 200 MG capsule, Place 1 capsule (200 mg) vaginally 2 times daily, Disp: 60 capsule, Rfl: 1     chorionic gonadotropin (NOVAREL/PREGNYL/PROFASI) 87020 UNITS IM SOLR, Inject 10,000 Units into the muscle once, Disp: 36032 Units, Rfl: 0     Probiotic Product (PROBIOTIC + OMEGA-3 PO), One day, Disp: , Rfl:      ibuprofen  "(ADVIL,MOTRIN) 400 MG tablet, Take 1-2 tablets (400-800 mg) by mouth every 6 hours as needed (cramping.  Begin after ketorolac doses completed.  Max of 3200mg/day), Disp: 60 tablet, Rfl: 1     Prenat Vit-FePoly-Methylfol-FA 29-1.13-0.4 MG TABS, Take  by mouth., Disp: , Rfl:      ALLERGIES:    Allergies   Allergen Reactions     Penicillins Hives        SOCIAL HISTORY:   Social History     Social History     Marital status:      Spouse name: Alexandro Natarajan     Number of children: 2     Years of education: 16+     Occupational History     Instructor/ [employer]     Social History Main Topics     Smoking status: Never Smoker     Smokeless tobacco: Never Used     Alcohol use No     Drug use: No     Sexual activity: Yes     Partners: Male      Comment: none     Other Topics Concern     Parent/Sibling W/ Cabg, Mi Or Angioplasty Before 65f 55m? No     Social History Narrative        FAMILY HISTORY:   Family History   Problem Relation Age of Onset     CANCER Mother      cervical     HEART DISEASE Mother      MVP     Thyroid Disease Mother      GASTROINTESTINAL DISEASE Mother      IBS     Psychotic Disorder Father      bipolar     Depression Father      DIABETES Maternal Grandfather      Prostate Cancer Paternal Grandfather      HEART DISEASE Paternal Grandfather      pacemaker     Allergies Sister      Depression Sister      OSTEOPOROSIS Paternal Grandmother         EXAM:Vitals: Ht 1.683 m (5' 6.25\")  Wt 74.8 kg (165 lb)  BMI 26.43 kg/m2  BMI= Body mass index is 26.43 kg/(m^2).    Weight management plan: Patient was referred to their PCP to discuss a diet and exercise plan.    General appearance: Patient is alert and fully cooperative with history & exam.  No sign of distress is noted during the visit.     Psychiatric: Affect is pleasant & appropriate.  Patient appears motivated to improve health.     Respiratory: Breathing is regular & unlabored while sitting.     HEENT: Hearing is intact to " spoken word.  Speech is clear.  No gross evidence of visual impairment that would impact ambulation.     Dermatologic: Skin is intact to both lower extremities without significant lesions, rash or abrasion.  No paronychia or evidence of soft tissue infection is noted.     Vascular: DP & PT pulses are intact & regular bilaterally.  No significant edema or varicosities noted.  CFT and skin temperature is normal to both lower extremities.     Neurologic: Lower extremity sensation is intact to light touch.  No evidence of weakness or contracture in the lower extremities.  No evidence of neuropathy.     Musculoskeletal: Patient is ambulatory without assistive device or brace.  No gross ankle deformity noted.  No foot or ankle joint effusion is noted.  One notes a positive tinells sign over the third intermetatarsal space on the right.  No surrounding erythema or edema noted.         ASSESSMENT / PLAN:     ICD-10-CM    1. Right foot pain M79.671    2. 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  We discussed the nature of the condition as well as the treatment plan and expected length of recovery.  At this time, the forefoot will be offloaded with metatarsal pads which will attempt to offload the third intermetatarsal space.  If symptoms persist, she may benefit from a steroid injection.        Disclaimer: This note consists of symbols derived from keyboarding, dictation and/or voice recognition software. As a result, there may be errors in the script that have gone undetected. Please consider this when interpreting information found in this chart.       DILIA Arauz D.P.M., [email or web address].

## 2017-02-15 NOTE — PATIENT INSTRUCTIONS
BABB'S NEUROMA   What is a Babb's Neuroma?   Babb's neuroma is an enlargement or thickening of a nerve in the foot. It is also sometimes referred to as an intermetatarsal neuroma, interdigital neuroma, Bbab's metatarsalgia (pain in the metatarsal head area), steve-neural fibrosis (scar tissue around a nerve) or entrapment neuropathy (abnormal nerve due to compression). A Babb's neuroma most commonly occurs in the third interspace between the third and fourth toes, followed by the second interspace between the second and third toes. Babb's neuromas have also occurred in the fourth and first interspaces, but these are rare. If you have a Babb's neuroma, there is a 15% chance it will occur bilaterally (on both feet). Babb's neuromas occur most commonly in women who are between 30 to 50 years old. The reason they are more common in women is thought to be due to the shoes women wear.   What Causes a Babb's Neuroma?   A Babb's neuroma is thought to be caused by trauma to the nerve, but scientists are still not sure about the exact cause of the trauma. The trauma may be caused by the metatarsal heads, the deep transverse intermetatarsal ligament (holds the metatarsal heads together) or an intermetatarsal bursa (fluid-filled sac). All of these structures can cause compression/trauma on the nerve which initially causes swelling and injury in the nerve. Over time if the compression/trauma continues, the nerve repairs itself with very fibrous tissue that leads to enlargement and thickening of the nerve. Other causes of trauma to the nerve may include; overpronation (foot rolls inward), hypermobility (too much motion), cavo varus (high arch foot) and excessive dorsiflexion (toes bend upward) of the toes. These biomechanical (howthe foot moves) factors may cause trauma to the nerve with every step. If the nerve becomes irritated and enlarged then it takes up more space and gets even more compressed and  "irritated. It becomes a vicious cycle.   Signs & Symptoms   - Pain (sharp, stabbing, throbbing, shooting)    - Numbness    - Tingling or \"pins & needles\"    - Burning    - Cramping    - A feeling that you are stepping on something or that something is in your shoe    - Initially the symptoms may happen once in a while, but as the condition gets     worse, the symptoms may happen all of the time    - It usually feels better by taking off your shoe and massaging your foot     Diagnosis/Tests (Exam): Your podiatrist will ask many questions about your signs and symptoms and will perform a physical exam. Some of the exams may include a web space compression test. This is done by squeezing the metatarsals together with one hand and using the thumb and index finger of the other hand to compress the affected web space to reproduce the pain/symptoms. A palpable click (Victoriano's click) is usually present. This test may also cause pain to shoot into the toes and that is called a Tinel's sign. Shaylee's test involves squeezing the metatarsals together and moving the toes up and down for 30 seconds. This will usually cause pain or it will bring on your other symptoms. Molina's sign is positive when you stand and the affected toes spread apart. A Babb's neuroma is usually diagnosed based on the history and physical exam findings, but sometimes other tests such as an x-ray, ultrasound or an MRI are needed.   Treatment  1.  Footwear changes: Wear shoes that are wide and deep in the toe box so they  do not put pressure on your toes and metatarsals. Avoid wearing high heels because they cause increased pressure on the ball of your foot (forefoot).    2.  Metatarsal pads: These help to lift and separate the metatarsal heads to take pressure off of the nerve. They are placed just behind where you feel the pain, not on top of the painful spot.   3.  Activity modification: For example, you may try swimming instead of running until " your symptoms go away.   4.  Taping   5.  Icing   6.  NSAIDs (anti-inflammatories): aleve, ibuprofen, etc.   7.  Arch supports or orthotics: These help to control some of the abnormal motion in your feet. The abnormal motion can lead to extra torque and pressure on the nerve.   8.  Physical Therapy  9.  Cortisone injection: Helps to decrease the size of the irritated, enlarged nerve.   10.  Sclerosing Alcohol injection: Helps to destroy the nerve chemically. Does cause permanent numbness  11.  Surgery: If conservative treatment does not help surgery may be needed. Surgery may involve cutting out the nerve or cutting the intermetatarsalligament. Studies have shown surgery has an 80-85% success rate.  Will result in numbness     Prevention   -Avoid wearing narrow, pointed toe shoes   -Avoid wearing high heel shoes

## 2017-02-20 ENCOUNTER — VIRTUAL VISIT (OUTPATIENT)
Dept: FAMILY MEDICINE | Facility: OTHER | Age: 37
End: 2017-02-20

## 2017-02-20 NOTE — PROGRESS NOTES
Date:   Clinician: Joel Wegener  Clinician NPI: [id number]  Patient: Sharlene Natarajan  Patient : 1980  Patient Address: [address] [place] [address]  Patient Phone: [phone]  Visit Protocol: Conjunctivitis  Patient Summary:  Sharlene is a 37 year old (: 1980 ) who initiated a Zip for evaluation of conjunctivitis.      Sharlene uploaded images of her eye condition.   Sharlene describes her symptoms in the following way: the white part of the eye is mostly red. Her eye(s) itch. There is yellow drainage coming from her eye(s). Her eye(s) is stuck shut in the morning from the drainage. She also notes the eyelid is swollen slightly, but the swelling does not affect the patient's ability to open eye(s).   Her symptoms started suddenly, have persisted for 1 to 3 days and affect only the right eye. She does not have a headache and denies having a fever.   Sharlene denies:     Recent injury to the eye    Getting dust, dirt, or some other material in the eye(s)    A recent decrease in vision    Significant sensitivity to light    Receiving eye surgery in the past month    Being treated for an eye infection in the past 2 to 4 weeks    A new rash on the face    Having a bump on the eyelid    Glaucoma    Receiving a diagnosis of conjunctivitis within the past month    Having high blood pressure    Wearing contact lenses    Having a bright red spot on the eye(s)    Eye pain    Having seasonal allergies     Sharlene has been recently exposed to someone with an eye infection. She has not recently had a respiratory infection.   Sharlene has not been vaccinated for chickenpox and has not been vaccinated for shingles. The patient had chickenpox in the past.   Proof of medication is required in order to return to work, school, or day care.  Sharlene is not currently taking any medications to treat her symptoms.   She states she is not pregnant and denies breastfeeding. She  has menstruated in the past month.   Sharlene does not smoke or use smokeless tobacco.   MEDICATIONS:  No current medications   , ALLERGIES:   penicillin/amoxicillin/augmentin    Clinician Response:  Dear Sharlene,  Based on the information you provided, you most likely have viral conjunctivitis, more commonly called Pink Eye. There are no available drops or ointments to cure the virus causing this type of conjunctivitis. Specifically, antibiotics will not cure a viral infection. Just like a common cold, your pink eye has to run its course. In some cases this may take 2-3 weeks. I understand that you require some proof of medication before you can return to work, school, or . For this reason, I am prescribing an antibiotic medication. It is possible that this is a viral infection and the medication will not make a viral infection go away more quickly. However, using the medication as prescribed will allow you to return to school, work, or . Please print a copy of your Zip if you need a 'note'.   I am prescribing the following medication(s):   Polymyxin B-Trimethoprim Solution (Polytrim). Apply 1 drop in the affected eye(s) every 3 hours, up to 6 times a day for 7 days.   To prevent the spread of your infection, do not touch your hands to your eyes - even to itch them. Wash your hands regularly - at least once per hour. Change your towel and washcloth daily and don't share them with others. Throw away any eye cosmetics you've been using, particularly mascara. Don't use anyone else's eye cosmetics or personal eye-care items.   It is very important to use the eye drops exactly as directed. Do not use the eye drops longer than prescribed as this will increase the chance of side-effects. If you are taking your medications as directed and you do not see any improvements after 2 days, you need to be seen in a clinic for further evaluation.   Diagnosis: Viral Conjunctivitis  Diagnosis ICD:  B30.8  Prescription: polymyxin B/trimethoprim (Polytrim) 10,000 units-1mg 1ml ophthalmic solution 10 ml, 7 days supply. One drop in the affected eye(s) every 3 hours up to 6 times a day for 7 days. Refills: 0, Refill as needed: no, Allow substitutions: yes  Prescription Sent At: February 20 13:33:40, 2017  Pharmacy: [facility] - [phone] - [address], [place] [address]

## 2017-10-22 ENCOUNTER — HEALTH MAINTENANCE LETTER (OUTPATIENT)
Age: 37
End: 2017-10-22

## 2017-11-22 ENCOUNTER — OFFICE VISIT (OUTPATIENT)
Dept: FAMILY MEDICINE | Facility: CLINIC | Age: 37
End: 2017-11-22
Payer: COMMERCIAL

## 2017-11-22 VITALS
SYSTOLIC BLOOD PRESSURE: 140 MMHG | DIASTOLIC BLOOD PRESSURE: 70 MMHG | OXYGEN SATURATION: 99 % | HEART RATE: 67 BPM | BODY MASS INDEX: 24.72 KG/M2 | HEIGHT: 66 IN | TEMPERATURE: 97.8 F | WEIGHT: 153.8 LBS

## 2017-11-22 DIAGNOSIS — Z23 NEED FOR PROPHYLACTIC VACCINATION AND INOCULATION AGAINST INFLUENZA: ICD-10-CM

## 2017-11-22 DIAGNOSIS — F41.8 ANXIETY WITH DEPRESSION: Primary | ICD-10-CM

## 2017-11-22 PROCEDURE — 90686 IIV4 VACC NO PRSV 0.5 ML IM: CPT | Performed by: INTERNAL MEDICINE

## 2017-11-22 PROCEDURE — 90471 IMMUNIZATION ADMIN: CPT | Performed by: INTERNAL MEDICINE

## 2017-11-22 PROCEDURE — 99214 OFFICE O/P EST MOD 30 MIN: CPT | Mod: 25 | Performed by: INTERNAL MEDICINE

## 2017-11-22 RX ORDER — DULOXETIN HYDROCHLORIDE 60 MG/1
60 CAPSULE, DELAYED RELEASE ORAL DAILY
Qty: 30 CAPSULE | Refills: 3 | Status: SHIPPED | OUTPATIENT
Start: 2017-11-22 | End: 2018-04-19

## 2017-11-22 RX ORDER — HYDROXYZINE HYDROCHLORIDE 25 MG/1
25 TABLET, FILM COATED ORAL EVERY 6 HOURS PRN
Qty: 60 TABLET | Refills: 1 | Status: SHIPPED | OUTPATIENT
Start: 2017-11-22 | End: 2019-03-22

## 2017-11-22 ASSESSMENT — ANXIETY QUESTIONNAIRES
GAD7 TOTAL SCORE: 19
1. FEELING NERVOUS, ANXIOUS, OR ON EDGE: NEARLY EVERY DAY
5. BEING SO RESTLESS THAT IT IS HARD TO SIT STILL: MORE THAN HALF THE DAYS
2. NOT BEING ABLE TO STOP OR CONTROL WORRYING: NEARLY EVERY DAY
3. WORRYING TOO MUCH ABOUT DIFFERENT THINGS: NEARLY EVERY DAY
6. BECOMING EASILY ANNOYED OR IRRITABLE: NEARLY EVERY DAY
IF YOU CHECKED OFF ANY PROBLEMS ON THIS QUESTIONNAIRE, HOW DIFFICULT HAVE THESE PROBLEMS MADE IT FOR YOU TO DO YOUR WORK, TAKE CARE OF THINGS AT HOME, OR GET ALONG WITH OTHER PEOPLE: VERY DIFFICULT
7. FEELING AFRAID AS IF SOMETHING AWFUL MIGHT HAPPEN: MORE THAN HALF THE DAYS

## 2017-11-22 ASSESSMENT — PATIENT HEALTH QUESTIONNAIRE - PHQ9
5. POOR APPETITE OR OVEREATING: NEARLY EVERY DAY
SUM OF ALL RESPONSES TO PHQ QUESTIONS 1-9: 18

## 2017-11-22 NOTE — NURSING NOTE
"Chief Complaint   Patient presents with     Anxiety     x 3 months     Imm/Inj     flu vaccine        Initial /70 (BP Location: Left arm, Patient Position: Chair, Cuff Size: Adult Regular)  Pulse 67  Temp 97.8  F (36.6  C) (Tympanic)  Ht 5' 6.25\" (1.683 m)  Wt 153 lb 12.8 oz (69.8 kg)  SpO2 99%  BMI 24.64 kg/m2 Estimated body mass index is 24.64 kg/(m^2) as calculated from the following:    Height as of this encounter: 5' 6.25\" (1.683 m).    Weight as of this encounter: 153 lb 12.8 oz (69.8 kg).  Medication Reconciliation: complete   Rosa TONG CMA (AAMA)    "

## 2017-11-22 NOTE — MR AVS SNAPSHOT
After Visit Summary   11/22/2017    Sharlene Natarajan    MRN: [id number]           Patient Information     Date Of Birth          1980        Visit Information        Provider Department      11/22/2017 2:40 PM Trevor Wise MD [facility]        Today's Diagnoses     Need for prophylactic vaccination and inoculation against influenza    -  1    Anxiety with depression          Care Instructions    Follow-up in 2 months so we can see how things are going.    Call Antionette to set up a therapy appointment.            Follow-ups after your visit        Your next 10 appointments already scheduled     Nov 29, 2017  2:30 PM CST   [facility] Annual Physical with Anna Saldaña MD   [facility] ([facility])    [address] [address] [phone]              Who to contact     If you have questions or need follow up information about today's clinic visit or your schedule please contact [facility] directly at [phone].  Normal or non-critical lab and imaging results will be communicated to you by Clarassancet, letter or phone within 4 business days after the clinic has received the results. If you do not hear from us within 7 days, please contact the clinic through "[employer] or phone. If you have a critical or abnormal lab result, we will notify you by phone as soon as possible.  Submit refill requests through "[employer] or call your pharmacy and they will forward the refill request to us. Please allow 3 business days for your refill to be completed.          Additional Information About Your Visit        [employer] Information     "[employer] gives you secure access to your electronic health record. If you see a primary care provider, you can also send messages to your care team and make appointments. If you have questions, please call your primary care clinic.  If you do not have a primary care provider, please call  "[phone] and they will assist you.        Care EveryWhere ID     This is your Care EveryWhere ID. This could be used by other organizations to access your [place] medical records  [id number]        Your Vitals Were     Pulse Temperature Height Pulse Oximetry BMI (Body Mass Index)       67 97.8  F (36.6  C) (Tympanic) 5' 6.25\" (1.683 m) 99% 24.64 kg/m2        Blood Pressure from Last 3 Encounters:   11/22/17 140/70   01/10/17 129/74   11/11/15 132/75    Weight from Last 3 Encounters:   11/22/17 153 lb 12.8 oz (69.8 kg)   02/09/17 165 lb (74.8 kg)   01/10/17 165 lb (74.8 kg)              We Performed the Following     FLU VAC, SPLIT VIRUS IM > 3 YO (QUADRIVALENT) [68580]     Vaccine Administration, Initial [16563]          Today's Medication Changes          These changes are accurate as of: 11/22/17  3:29 PM.  If you have any questions, ask your nurse or doctor.               Start taking these medicines.        Dose/Directions    DULoxetine 60 MG EC capsule   Commonly known as:  CYMBALTA   Used for:  Anxiety with depression   Started by:  Trevor Wise MD        Dose:  60 mg   Take 1 capsule (60 mg) by mouth daily   Quantity:  30 capsule   Refills:  3       hydrOXYzine 25 MG tablet   Commonly known as:  ATARAX   Used for:  Anxiety with depression   Started by:  Trevor Wise MD        Dose:  25 mg   Take 1 tablet (25 mg) by mouth every 6 hours as needed for anxiety   Quantity:  60 tablet   Refills:  1            Where to get your medicines      These medications were sent to [facility] [place], [place] - [address]  [address] [address]     Phone:  [phone]     DULoxetine 60 MG EC capsule    hydrOXYzine 25 MG tablet                Primary Care Provider Office Phone # Fax #    Jackie Olivo [phone] [address] [address]        Equal Access to Services     ISIDRO GAMBLE AH: Hadii atilio Hogan, " dayronmitraherman ronaldomaribelelma tinocosusan cynthia elizabethin hayaan adeeg kharash la'aan ah. So [facility] [phone].    ATENCIÓN: Si lucinda cabrera, tiene a claros disposición servicios gratuitos de asistencia lingüística. Dean al [phone].    We comply with applicable federal civil rights laws and [place] laws. We do not discriminate on the basis of race, color, national origin, age, disability, sex, sexual orientation, or gender identity.            Thank you!     Thank you for choosing [facility]  for your care. Our goal is always to provide you with excellent care. Hearing back from our patients is one way we can continue to improve our services. Please take a few minutes to complete the written survey that you may receive in the mail after your visit with us. Thank you!             Your Updated Medication List - Protect others around you: Learn how to safely use, store and throw away your medicines at www.disposemymeds.org.          This list is accurate as of: 11/22/17  3:29 PM.  Always use your most recent med list.                   Brand Name Dispense Instructions for use Diagnosis    chorionic gonadotropin 35380 UNITS IM SOLR    NOVAREL/PREGNYL/PROFASI    30108 Units    Inject 10,000 Units into the muscle once        clomiPHENE 50 MG tablet    CLOMID    5 tablet    Take 2 tablets (100 mg) by mouth daily Day 3-7    Anovulation       DULoxetine 60 MG EC capsule    CYMBALTA    30 capsule    Take 1 capsule (60 mg) by mouth daily    Anxiety with depression       hydrOXYzine 25 MG tablet    ATARAX    60 tablet    Take 1 tablet (25 mg) by mouth every 6 hours as needed for anxiety    Anxiety with depression       ibuprofen 400 MG tablet    ADVIL/MOTRIN    60 tablet    Take 1-2 tablets (400-800 mg) by mouth every 6 hours as needed (cramping.  Begin after ketorolac doses completed.  Max of 3200mg/day)    Postoperative state       order for DME     1 Units    Equipment being ordered: Dynaflex insert    Skinny neuroma, right        Prenat Vit-FePoly-Methylfol-FA 29-1.13-0.4 MG Tabs      Take  by mouth.        PROBIOTIC + OMEGA-3 PO      One day        progesterone 200 MG capsule    PROMETRIUM    60 capsule    Place 1 capsule (200 mg) vaginally 2 times daily    Female infertility

## 2017-11-22 NOTE — PROGRESS NOTES
SUBJECTIVE:   Sharlene Natarajan is a 37 year old female who presents to clinic today for the following health issues:  Chief Complaint   Patient presents with     Anxiety     x 3 months     Imm/Inj     flu vaccine        Abnormal Mood Symptoms  Onset: x 3 months worse, has been ongoing for years    Description:   Depression: YES  Anxiety: YES    Accompanying Signs & Symptoms:  Still participating in activities that you used to enjoy: no, not as much  Fatigue: YES  Irritability: YES  Difficulty concentrating: YES  Changes in appetite: YES, not hungry  Problems with sleep: YES- unable to fall asleep or stay asleep   Heart racing/beating fast : YES  Thoughts of hurting yourself or others: none    History:   Recent stress: YES- work and family has been stressful   Prior depression hospitalization: None  Family history of depression: YES- father and sister  Family history of anxiety: YES    Precipitating factors:   Alcohol/drug use: no     Alleviating factors:  Exercise usually helps, but patient reports she has not been motivated lately and has not had the energy.      Therapies Tried and outcome: Lexapro (Escitalopram) about 10 years ago for a very short time.      Sharlene says she has had anxiety and depression to some degree for many years, but this has worsened over the past 3 months.  She has had more family and work stress recently.  She talked to work about this and plans to cut down on her hours.  She did have some post-partum symptoms after the birth of her daughter 3.5 years ago, but did not take medication.  She was on escitalopram 10 or more years ago for a couple of months, she doesn't remember if this was helpful or not.  She typically has managed her symptoms with exercise, but finds herself unable to do so as well currently.  She did do therapy a couple of years ago, she didn't find it too helpful but she is interested in trying it again.    She has been having episodes of more severe anxiety that  are accompanied by nausea and arm numbness.       PHQ-9 SCORE 3/19/2014 6/10/2014 2017   Total Score 4 12 -   Total Score - - 18     MARLENE-7 SCORE 6/10/2014 2017   Total Score 14 -   Total Score - 19     No SI.        Problem list and histories reviewed & adjusted, as indicated.  Additional history: as documented    Patient Active Problem List   Diagnosis     Female infertility     CARDIOVASCULAR SCREENING; LDL GOAL LESS THAN 130     Past Surgical History:   Procedure Laterality Date      SECTION  2011    Procedure: SECTION;  Section; Surgeon:ABELINO CABAN; Location:[facility] OR      SECTION  2014    Procedure:  SECTION;   Section;  Surgeon: Abelino Caban MD;  Location: [facility] OR       Social History   Substance Use Topics     Smoking status: Never Smoker     Smokeless tobacco: Never Used     Alcohol use No     Family History   Problem Relation Age of Onset     CANCER Mother      cervical     HEART DISEASE Mother      MVP     Thyroid Disease Mother      GASTROINTESTINAL DISEASE Mother      IBS     Psychotic Disorder Father      bipolar     Depression Father      DIABETES Maternal Grandfather      Prostate Cancer Paternal Grandfather      HEART DISEASE Paternal Grandfather      pacemaker     Allergies Sister      Depression Sister      OSTEOPOROSIS Paternal Grandmother          Current Outpatient Prescriptions   Medication Sig Dispense Refill     DULoxetine (CYMBALTA) 60 MG EC capsule Take 1 capsule (60 mg) by mouth daily 30 capsule 3     hydrOXYzine (ATARAX) 25 MG tablet Take 1 tablet (25 mg) by mouth every 6 hours as needed for anxiety 60 tablet 1     Prenat Vit-FePoly-Methylfol-FA 29-1.13-0.4 MG TABS Take  by mouth.       order for DME Equipment being ordered: Dynaflex insert 1 Units 0     clomiPHENE (CLOMID) 50 MG tablet Take 2 tablets (100 mg) by mouth daily Day 3-7 5 tablet 3     progesterone (PROMETRIUM) 200 MG capsule Place 1 capsule  "(200 mg) vaginally 2 times daily (Patient not taking: Reported on 11/22/2017) 60 capsule 1     chorionic gonadotropin (NOVAREL/PREGNYL/PROFASI) 98603 UNITS IM SOLR Inject 10,000 Units into the muscle once 21141 Units 0     Probiotic Product (PROBIOTIC + OMEGA-3 PO) One day       ibuprofen (ADVIL,MOTRIN) 400 MG tablet Take 1-2 tablets (400-800 mg) by mouth every 6 hours as needed (cramping.  Begin after ketorolac doses completed.  Max of 3200mg/day) 60 tablet 1     Allergies   Allergen Reactions     Penicillins Hives         Reviewed and updated as needed this visit by clinical staffTobacco  Allergies  Med Hx  Surg Hx  Fam Hx  Soc Hx      Reviewed and updated as needed this visit by Provider             OBJECTIVE:   /70 (BP Location: Left arm, Patient Position: Chair, Cuff Size: Adult Regular)  Pulse 67  Temp 97.8  F (36.6  C) (Tympanic)  Ht 5' 6.25\" (1.683 m)  Wt 153 lb 12.8 oz (69.8 kg)  SpO2 99%  BMI 24.64 kg/m2  Body mass index is 24.64 kg/(m^2).  GENERAL: healthy, alert and no distress  PSYCH: mentation appears normal, affect mostly normal but occasionally tearful (when discussing her family's struggles with dep/anx)      ASSESSMENT/PLAN:       1. Anxiety with depression    Sharlene has had years of intermittent anxiety and depression symptoms that she has managed without medication, but things are worse the past few months.  She would like to try medication and possibly restart therapy.  We discussed medication options.  She would like to avoid weight gain, and therefore we decided to first try duloxetine since this tends to be less with this medication.  Will try hydroxyzine prn for more severe anxiety episodes.  I provided her with Antionette Hinton's card to call to set up therapy.  Follow-up in 2 months.     - DULoxetine (CYMBALTA) 60 MG EC capsule; Take 1 capsule (60 mg) by mouth daily  Dispense: 30 capsule; Refill: 3  - hydrOXYzine (ATARAX) 25 MG tablet; Take 1 tablet (25 mg) by mouth " every 6 hours as needed for anxiety  Dispense: 60 tablet; Refill: 1    2. Need for prophylactic vaccination and inoculation against influenza    - FLU VAC, SPLIT VIRUS IM > 3 YO (QUADRIVALENT) [94831]  - Vaccine Administration, Initial [15069]    Trevor Wise MD  [facility]    Injectable Influenza Immunization Documentation    1.  Is the person to be vaccinated sick today?   No    2. Does the person to be vaccinated have an allergy to a component   of the vaccine?   No  Egg Allergy Algorithm Link    3. Has the person to be vaccinated ever had a serious reaction   to influenza vaccine in the past?   No    4. Has the person to be vaccinated ever had Guillain-Barré syndrome?   No    Form completed by Rosa TONG CMA ([facility])

## 2017-11-23 ASSESSMENT — ANXIETY QUESTIONNAIRES: GAD7 TOTAL SCORE: 19

## 2017-11-28 ENCOUNTER — MYC MEDICAL ADVICE (OUTPATIENT)
Dept: FAMILY MEDICINE | Facility: CLINIC | Age: 37
End: 2017-11-28

## 2017-11-28 DIAGNOSIS — F41.8 ANXIETY WITH DEPRESSION: Primary | ICD-10-CM

## 2017-11-28 NOTE — TELEPHONE ENCOUNTER
Routed to provider.  Please see [employer] message below.  Adjust dose?  Thank you.  Olive Hoffman RN

## 2017-11-29 ENCOUNTER — OFFICE VISIT (OUTPATIENT)
Dept: OBGYN | Facility: CLINIC | Age: 37
End: 2017-11-29
Payer: COMMERCIAL

## 2017-11-29 ENCOUNTER — RESULT FOLLOW UP (OUTPATIENT)
Dept: OBGYN | Facility: CLINIC | Age: 37
End: 2017-11-29

## 2017-11-29 VITALS
HEIGHT: 66 IN | HEART RATE: 80 BPM | SYSTOLIC BLOOD PRESSURE: 134 MMHG | WEIGHT: 149 LBS | BODY MASS INDEX: 23.95 KG/M2 | DIASTOLIC BLOOD PRESSURE: 86 MMHG

## 2017-11-29 DIAGNOSIS — D06.9 CIN III WITH SEVERE DYSPLASIA: ICD-10-CM

## 2017-11-29 DIAGNOSIS — Z01.419 ENCOUNTER FOR GYNECOLOGICAL EXAMINATION WITHOUT ABNORMAL FINDING: Primary | ICD-10-CM

## 2017-11-29 DIAGNOSIS — Z12.4 SCREENING FOR MALIGNANT NEOPLASM OF CERVIX: ICD-10-CM

## 2017-11-29 DIAGNOSIS — F32.A DEPRESSION, UNSPECIFIED DEPRESSION TYPE: ICD-10-CM

## 2017-11-29 PROBLEM — F43.23 ADJUSTMENT DISORDER WITH MIXED ANXIETY AND DEPRESSED MOOD: Status: ACTIVE | Noted: 2017-11-29

## 2017-11-29 PROCEDURE — 99395 PREV VISIT EST AGE 18-39: CPT | Performed by: OBSTETRICS & GYNECOLOGY

## 2017-11-29 PROCEDURE — G0145 SCR C/V CYTO,THINLAYER,RESCR: HCPCS | Performed by: OBSTETRICS & GYNECOLOGY

## 2017-11-29 PROCEDURE — 87624 HPV HI-RISK TYP POOLED RSLT: CPT | Performed by: OBSTETRICS & GYNECOLOGY

## 2017-11-29 RX ORDER — DULOXETIN HYDROCHLORIDE 30 MG/1
30 CAPSULE, DELAYED RELEASE ORAL DAILY
Qty: 15 CAPSULE | Refills: 0 | Status: SHIPPED | OUTPATIENT
Start: 2017-11-29 | End: 2017-12-14

## 2017-11-29 NOTE — TELEPHONE ENCOUNTER
The pt has responded to your MyChart question. She is requesting the 30mg dose. I have an order cued up for your review.   Thank you,  Saritha Trejo RN

## 2017-11-29 NOTE — MR AVS SNAPSHOT
After Visit Summary   11/29/2017    Sharlene Natarajan    MRN: [id number]           Patient Information     Date Of Birth          1980        Visit Information        Provider Department      11/29/2017 2:30 PM Anna Saldaña MD [facility]        Today's Diagnoses     Encounter for gynecological examination without abnormal finding    -  1    Depression, unspecified depression type        Screening for malignant neoplasm of cervix          Care Instructions      Preventive Health Recommendations  Female Ages 26 - 39  Yearly exam:   See your health care provider every year in order to    Review health changes.     Discuss preventive care.      Review your medicines if you your doctor has prescribed any.    Until age 30: Get a Pap test every three years (more often if you have had an abnormal result).    After age 30: Talk to your doctor about whether you should have a Pap test every 3 years or have a Pap test with HPV screening every 5 years.   You do not need a Pap test if your uterus was removed (hysterectomy) and you have not had cancer.  You should be tested each year for STDs (sexually transmitted diseases), if you're at risk.   Talk to your provider about how often to have your cholesterol checked.  If you are at risk for diabetes, you should have a diabetes test (fasting glucose).  Shots: Get a flu shot each year. Get a tetanus shot every 10 years.   Nutrition:     Eat at least 5 servings of fruits and vegetables each day.    Eat whole-grain bread, whole-wheat pasta and brown rice instead of white grains and rice.    Talk to your provider about Calcium and Vitamin D.     Lifestyle    Exercise at least 150 minutes a week (30 minutes a day, 5 days of the week). This will help you control your weight and prevent disease.    Limit alcohol to one drink per day.    No smoking.     Wear sunscreen to prevent skin cancer.    See your dentist every six months for an exam and  "cleaning.            Follow-ups after your visit        Who to contact     If you have questions or need follow up information about today's clinic visit or your schedule please contact [facility] directly at [phone].  Normal or non-critical lab and imaging results will be communicated to you by MyChart, letter or phone within 4 business days after the clinic has received the results. If you do not hear from us within 7 days, please contact the clinic through MyChart or phone. If you have a critical or abnormal lab result, we will notify you by phone as soon as possible.  Submit refill requests through [email or web address] or call your pharmacy and they will forward the refill request to us. Please allow 3 business days for your refill to be completed.          Additional Information About Your Visit        [employer] Information     [email or web address] gives you secure access to your electronic health record. If you see a primary care provider, you can also send messages to your care team and make appointments. If you have questions, please call your primary care clinic.  If you do not have a primary care provider, please call [phone] and they will assist you.        Care EveryWhere ID     This is your Care EveryWhere ID. This could be used by other organizations to access your [place] medical records  [id number]        Your Vitals Were     Pulse Height Last Period BMI (Body Mass Index)          80 5' 6.25\" (1.683 m) 11/21/2017 23.87 kg/m2         Blood Pressure from Last 3 Encounters:   11/29/17 134/86   11/22/17 140/70   01/10/17 129/74    Weight from Last 3 Encounters:   11/29/17 149 lb (67.6 kg)   11/22/17 153 lb 12.8 oz (69.8 kg)   02/09/17 165 lb (74.8 kg)              We Performed the Following     HPV High Risk Types DNA Cervical     Pap imaged thin layer screen with HPV - recommended age 30 - 65 years (select HPV order below)          Today's Medication Changes          These changes are accurate as of: " 11/29/17  2:58 PM.  If you have any questions, ask your nurse or doctor.               Stop taking these medicines if you haven't already. Please contact your care team if you have questions.     chorionic gonadotropin 06969 UNITS IM SOLR   Commonly known as:  NOVAREL/PREGNYL/PROFASI   Stopped by:  Anna Saldaña MD           clomiPHENE 50 MG tablet   Commonly known as:  CLOMID   Stopped by:  Anna Saldaña MD           progesterone 200 MG capsule   Commonly known as:  PROMETRIUM   Stopped by:  Anna Saldaña MD                    Primary Care Provider Office Phone # Fax #    Jackie Nohemy Olivo [phone] [address] [facility] [address]        Equal Access to Services     [facility] : Hadii mandy rosarioo Soreinier, waaxda luqadaha, qaybta kaalmada adeegyada, cynthia mugnuia . So [facility] [phone].    ATENCIÓN: Si habla español, tiene a clarso disposición servicios gratuitos de asistencia lingüística. [facility] [phone].    We comply with applicable federal civil rights laws and [place] laws. We do not discriminate on the basis of race, color, national origin, age, disability, sex, sexual orientation, or gender identity.            Thank you!     Thank you for choosing [facility]  for your care. Our goal is always to provide you with excellent care. Hearing back from our patients is one way we can continue to improve our services. Please take a few minutes to complete the written survey that you may receive in the mail after your visit with us. Thank you!             Your Updated Medication List - Protect others around you: Learn how to safely use, store and throw away your medicines at www.disposemymeds.org.          This list is accurate as of: 11/29/17  2:58 PM.  Always use your most recent med list.                   Brand Name Dispense Instructions for use Diagnosis    * DULoxetine 60 MG EC capsule     CYMBALTA    30 capsule    Take 1 capsule (60 mg) by mouth daily    Anxiety with depression       * DULoxetine 30 MG EC capsule    CYMBALTA    15 capsule    Take 1 capsule (30 mg) by mouth daily    Anxiety with depression       hydrOXYzine 25 MG tablet    ATARAX    60 tablet    Take 1 tablet (25 mg) by mouth every 6 hours as needed for anxiety    Anxiety with depression       ibuprofen 400 MG tablet    ADVIL/MOTRIN    60 tablet    Take 1-2 tablets (400-800 mg) by mouth every 6 hours as needed (cramping.  Begin after ketorolac doses completed.  Max of 3200mg/day)    Postoperative state       order for DME     1 Units    Equipment being ordered: Dynaflex insert    Babb neuroma, right       Prenat Vit-FePoly-Methylfol-FA 29-1.13-0.4 MG Tabs      Take  by mouth.        PROBIOTIC + OMEGA-3 PO      One day        * Notice:  This list has 2 medication(s) that are the same as other medications prescribed for you. Read the directions carefully, and ask your doctor or other care provider to review them with you.

## 2017-11-29 NOTE — PROGRESS NOTES
SUBJECTIVE:   CC: Sharlene Natarajan is an 37 year old woman who presents for preventive health visit.     Healthy Habits:    Do you get at least three servings of calcium containing foods daily (dairy, green leafy vegetables, etc.)? yes    Amount of exercise or daily activities, outside of work: 4-5 day(s) per week    Problems taking medications regularly No    Medication side effects: yes cymbalta    Have you had an eye exam in the past two years? yes    Do you see a dentist twice per year? yes    Do you have sleep apnea, excessive snoring or daytime drowsiness?no            Today's PHQ-2 Score:   PHQ-2 (  [employer]) 2017 1/10/2017   Q1: Little interest or pleasure in doing things 1 0   Q2: Feeling down, depressed or hopeless 1 0   PHQ-2 Score 2 0         Abuse: Current or Past(Physical, Sexual or Emotional)- No  Do you feel safe in your environment - Yes  Social History   Substance Use Topics     Smoking status: Never Smoker     Smokeless tobacco: Never Used     Alcohol use No     The patient does not drink >3 drinks per day nor >7 drinks per week.    Reviewed orders with patient.  Reviewed health maintenance and updated orders accordingly - Yes  Labs reviewed in EPIC  BP Readings from Last 3 Encounters:   17 134/86   17 140/70   01/10/17 129/74    Wt Readings from Last 3 Encounters:   17 149 lb (67.6 kg)   17 153 lb 12.8 oz (69.8 kg)   17 165 lb (74.8 kg)                  Patient Active Problem List   Diagnosis     Female infertility     CARDIOVASCULAR SCREENING; LDL GOAL LESS THAN 130     Depression     Past Surgical History:   Procedure Laterality Date      SECTION  2011    Procedure: SECTION;  Section; Surgeon:ABELINO CABAN; Location:[facility]      SECTION  2014    Procedure:  SECTION;   Section;  Surgeon: Abelino Caban MD;  Location: [facility] OR       Social History   Substance Use Topics     Smoking  "status: Never Smoker     Smokeless tobacco: Never Used     Alcohol use No     Family History   Problem Relation Age of Onset     CANCER Mother      cervical     HEART DISEASE Mother      MVP     Thyroid Disease Mother      GASTROINTESTINAL DISEASE Mother      IBS     Psychotic Disorder Father      bipolar     Depression Father      DIABETES Maternal Grandfather      Prostate Cancer Paternal Grandfather      HEART DISEASE Paternal Grandfather      pacemaker     Allergies Sister      Depression Sister      OSTEOPOROSIS Paternal Grandmother                Mammogram not appropriate for this patient based on age.    Pertinent mammograms are reviewed under the imaging tab.  History of abnormal Pap smear: NO - age 30- 65 PAP every 3 years recommended    Reviewed and updated as needed this visit by clinical staffTobacco  Allergies  Meds  Med Hx  Surg Hx  Fam Hx  Soc Hx        Reviewed and updated as needed this visit by Provider              ROS:  C: NEGATIVE for fever, chills, change in weight  CONSTITUTIONAL:POSITIVE  for depressed mood, hot flashes  I: NEGATIVE for worrisome rashes, moles or lesions  E: NEGATIVE for vision changes or irritation  ENT: NEGATIVE for ear, mouth and throat problems  R: NEGATIVE for significant cough or SOB  B: NEGATIVE for masses, tenderness or discharge  CV: NEGATIVE for chest pain, palpitations or peripheral edema  GI: NEGATIVE for nausea, abdominal pain, heartburn, or change in bowel habits  : NEGATIVE for unusual urinary or vaginal symptoms. Periods are regular.  M: NEGATIVE for significant arthralgias or myalgia  N: NEGATIVE for weakness, dizziness or paresthesias  P: NEGATIVE for changes in mood or affect    OBJECTIVE:   /86 (BP Location: Right arm, Patient Position: Chair, Cuff Size: Adult Small)  Pulse 80  Ht 5' 6.25\" (1.683 m)  Wt 149 lb (67.6 kg)  LMP 11/21/2017  BMI 23.87 kg/m2  EXAM:  GENERAL: healthy, alert and no distress  EYES: Eyes grossly normal to " "inspection, PERRL and conjunctivae and sclerae normal  HENT: ear canals and TM's normal, nose and mouth without ulcers or lesions  NECK: no adenopathy, no asymmetry, masses, or scars and thyroid normal to palpation  RESP: lungs clear to auscultation - no rales, rhonchi or wheezes  BREAST: normal without masses, tenderness or nipple discharge and no palpable axillary masses or adenopathy  CV: regular rate and rhythm, normal S1 S2, no S3 or S4, no murmur, click or rub, no peripheral edema and peripheral pulses strong  ABDOMEN: soft, nontender, no hepatosplenomegaly, no masses and bowel sounds normal   (female): normal female external genitalia, normal urethral meatus, vaginal mucosa pink, moist, well rugated, and normal cervix/adnexa/uterus without masses or discharge  MS: no gross musculoskeletal defects noted, no edema  SKIN: no suspicious lesions or rashes  NEURO: Normal strength and tone, mentation intact and speech normal  PSYCH: mentation appears normal, affect normal/bright    ASSESSMENT/PLAN:       ICD-10-CM    1. Encounter for gynecological examination without abnormal finding Z01.419    2. Depression, unspecified depression type F32.9    3. Screening for malignant neoplasm of cervix Z12.4 Pap imaged thin layer screen with HPV - recommended age 30 - 65 years (select HPV order below)     HPV High Risk Types DNA Cervical       COUNSELING:   Reviewed preventive health counseling, as reflected in patient instructions  Special attention given to:        good sources of calcium and Vit D       Regular exercise       Healthy diet/nutrition       Vision screening         reports that she has never smoked. She has never used smokeless tobacco.    Estimated body mass index is 23.87 kg/(m^2) as calculated from the following:    Height as of this encounter: 5' 6.25\" (1.683 m).    Weight as of this encounter: 149 lb (67.6 kg).         Counseling Resources:  ATP IV Guidelines  Pooled Cohorts Equation Calculator  Breast " Cancer Risk Calculator  FRAX Risk Assessment  ICSI Preventive Guidelines  Dietary Guidelines for Americans, 2010  USDA's MyPlate  ASA Prophylaxis  Lung CA Screening    Anna Saldaña MD  [facility]

## 2017-11-29 NOTE — NURSING NOTE
"Initial /86 (BP Location: Right arm, Patient Position: Chair, Cuff Size: Adult Small)  Pulse 80  Ht 5' 6.25\" (1.683 m)  Wt 149 lb (67.6 kg)  LMP 11/21/2017  BMI 23.87 kg/m2 Estimated body mass index is 23.87 kg/(m^2) as calculated from the following:    Height as of this encounter: 5' 6.25\" (1.683 m).    Weight as of this encounter: 149 lb (67.6 kg). .      "

## 2017-11-29 NOTE — LETTER
January 19, 2019      Sharlene Natarajan  [address] [facility] [address]    Dear MsCesarDelgado,      At [facility], your health and wellness is our primary concern. That is why we are following up on a LEEP from 2/2/18, which was reported as WINDY 3. Your provider had recommended that you have a Pap smear and HPV test completed by 2/2/19. Our records do not show that this has been scheduled.    It is important to complete the follow up that your provider has suggested for you to ensure that there are no worsening changes which may, over time, develop into cancer.      Please contact our office at  [phone] to schedule an appointment for a Pap smear and HPV test at your earliest convenience. If you have questions or concerns, please call the clinic and we will be happy to assist you.    If you have completed the tests outside of [facility], please have the results forwarded to our office. We will update the chart for your primary Physician to review before your next annual physical.     Thank you for choosing [facility]!    Sincerely,      Anna Saldaña MD/milind

## 2017-11-29 NOTE — TELEPHONE ENCOUNTER
Pt has not reviewed MyChart message from Dr. Macias.  Left pt a VM to call clinic or review message from Dr. Wise.  Ana MERLOS RN

## 2017-12-01 LAB
COPATH REPORT: NORMAL
PAP: NORMAL

## 2017-12-05 PROBLEM — R87.810 CERVICAL HIGH RISK HPV (HUMAN PAPILLOMAVIRUS) TEST POSITIVE: Status: ACTIVE | Noted: 2017-11-29

## 2017-12-05 LAB
FINAL DIAGNOSIS: ABNORMAL
HPV HR 12 DNA CVX QL NAA+PROBE: NEGATIVE
HPV16 DNA SPEC QL NAA+PROBE: POSITIVE
HPV18 DNA SPEC QL NAA+PROBE: NEGATIVE
SPECIMEN DESCRIPTION: ABNORMAL

## 2017-12-05 NOTE — PROGRESS NOTES
11/29/17 NIL pap, + HR HPV 16. Plan colp due by 2/29/18 12/6/17 Message left to return call. (brent)  12/6/17 Pt notified. Patient will call the [facility] to schedule a colp. (brent)  12/14/17 [place] Bx - WINDY 2 & 3, ECC - Negative. Plan LEEP  12/26/17 Pap RN attempted to call pt. Mailbox full and unable to leave a message.   12/27/17 Attempted to call pt. No answer, mailbox is full and I am unable to leave a message. Result released to pt via Freever with note to call to schedule a LEEP procedure. (brent)  12/29/17 Pt. Spoke to clinic RN regarding results. See My Chart dated 12/29/17 (cmc)  12/30/17 Pt read result and f/u plan on Freever. Pt left message for me to call her back. (brent)  1/2/18 Message left to return call. (brent)  2/2/18 LEEP: WINDY 3; margins clear.  Plan: Cotest in 1yr due by 2/2/19 (rlm)  2/8/18 Pt read result on Freever. (brent)  1/19/19 Cotest reminder letter sent (rl)  3/22/19 Pap: NIL, neg HR HPV. Plan cotest in 1 year/ck  8/24/20 CCt tracking. (MRA)

## 2017-12-08 ENCOUNTER — OFFICE VISIT (OUTPATIENT)
Dept: OBGYN | Facility: CLINIC | Age: 37
End: 2017-12-08
Payer: COMMERCIAL

## 2017-12-08 VITALS
HEART RATE: 55 BPM | DIASTOLIC BLOOD PRESSURE: 79 MMHG | HEIGHT: 66 IN | WEIGHT: 149 LBS | SYSTOLIC BLOOD PRESSURE: 125 MMHG | BODY MASS INDEX: 23.95 KG/M2

## 2017-12-08 DIAGNOSIS — R87.810 CERVICAL HIGH RISK HPV (HUMAN PAPILLOMAVIRUS) TEST POSITIVE: Primary | ICD-10-CM

## 2017-12-08 PROCEDURE — 99213 OFFICE O/P EST LOW 20 MIN: CPT | Performed by: OBSTETRICS & GYNECOLOGY

## 2017-12-08 NOTE — PROGRESS NOTES
Sharlene is a 37 year old   female who presents for discussion about recent findings on Pap and HPV test; her Pap was normal however the HR HPV test was pos for type 16; she has been with her partner since , has no prior h/o abnormal Pap or HPV tests; she is having no irregular or postcoital bleeding; she admits to having a lot of depression and anxiety, often resulting in loss of appetite.    Patient Active Problem List    Diagnosis Date Noted     Depression 2017     Priority: Medium     Started on Cymbalta 60mg--side effects; lowered after 1 week to 30mg QD       Cervical high risk HPV (human papillomavirus) test positive 2017     Priority: Medium     17 NIL pap, + HR HPV 16. Plan colp due by        CARDIOVASCULAR SCREENING; LDL GOAL LESS THAN 130 12/10/2012     Priority: Medium     Female infertility 2010     Priority: Medium     Cycle #1: Clomid 50mg day 3-7--Prog 10.8  Cycle #2: Clomid 50mg day 3-7; HCG trigger, IUI, Prometrium starting cycle day 22; day 21 prog=2.3  Cycle #3: Clomid 100mg day 3-7, day 10 sono showed borderline hyperstimulation; no trigger or IUI done; consider change to Letrazole next cycle or referral to [facility]; day 21 prog low  Cycle 4: Letrazole 2.5mg day 3-7  Referred to [facility]--4 cycles with IUI; with only 1 chemical pregnancy         All systems were reviewed and pertinent information in noted in subjective/HPI.    Past Medical History:   Diagnosis Date     Anxiety      Cervical high risk HPV (human papillomavirus) test positive 2017    type 16     Chickenpox      Chlamydia 2000     Depression      Infertility      Mild preeclampsia        Past Surgical History:   Procedure Laterality Date      SECTION  2011    Procedure: SECTION;  Section; Surgeon:ABELINO CABAN; Location:[facility]      SECTION  2014    Procedure:  SECTION;   Section;  Surgeon: Abelino Caban MD;  Location:  [facility] OR         Current Outpatient Prescriptions:      DULoxetine (CYMBALTA) 30 MG EC capsule, Take 1 capsule (30 mg) by mouth daily, Disp: 15 capsule, Rfl: 0     hydrOXYzine (ATARAX) 25 MG tablet, Take 1 tablet (25 mg) by mouth every 6 hours as needed for anxiety, Disp: 60 tablet, Rfl: 1     order for DME, Equipment being ordered: Dynaflex insert, Disp: 1 Units, Rfl: 0     Probiotic Product (PROBIOTIC + OMEGA-3 PO), One day, Disp: , Rfl:      ibuprofen (ADVIL,MOTRIN) 400 MG tablet, Take 1-2 tablets (400-800 mg) by mouth every 6 hours as needed (cramping.  Begin after ketorolac doses completed.  Max of 3200mg/day), Disp: 60 tablet, Rfl: 1     Prenat Vit-FePoly-Methylfol-FA 29-1.13-0.4 MG TABS, Take  by mouth., Disp: , Rfl:      DULoxetine (CYMBALTA) 60 MG EC capsule, Take 1 capsule (60 mg) by mouth daily (Patient not taking: Reported on 12/8/2017), Disp: 30 capsule, Rfl: 3    ALLERGIES:  Penicillins    Social History     Social History     Marital status:      Spouse name: Alexandro Natarajan     Number of children: 2     Years of education: 16+     Occupational History     Instructor/ [employer]     Social History Main Topics     Smoking status: Never Smoker     Smokeless tobacco: Never Used     Alcohol use No     Drug use: No     Sexual activity: Yes     Partners: Male      Comment: none     Other Topics Concern     Parent/Sibling W/ Cabg, Mi Or Angioplasty Before 65f 55m? No     Social History Narrative       Family History   Problem Relation Age of Onset     CANCER Mother      cervical     HEART DISEASE Mother      MVP     Thyroid Disease Mother      GASTROINTESTINAL DISEASE Mother      IBS     Psychotic Disorder Father      bipolar     Depression Father      DIABETES Maternal Grandfather      Prostate Cancer Paternal Grandfather      HEART DISEASE Paternal Grandfather      pacemaker     Allergies Sister      Depression Sister      OSTEOPOROSIS Paternal Grandmother        OBJECTIVE:   "Vitals: /79 (BP Location: Right arm, Patient Position: Chair, Cuff Size: Adult Small)  Pulse 55  Ht 5' 6.25\" (1.683 m)  Wt 149 lb (67.6 kg)  LMP 11/21/2017  BMI 23.87 kg/m2 BMI= Body mass index is 23.87 kg/(m^2).   Patient's last menstrual period was 11/21/2017.     GENERAL APPEARANCE: healthy, alert and no distress    ASSESSMENT:      ICD-10-CM    1. Cervical high risk HPV (human papillomavirus) test positive R87.810        PLAN:  I discussed with Sharlene how HPV can lie dormant and chose its opportunity to manifest itself, likely when her immune system is under stress. We discussed healthy eating, self care, exercise, water, antioxident vitamins and probiotics, how this virus is likely to clear on its own within 1-2 years, how colposcopy can help identify any precancerous changes manifest.  All questions were answered to her satisfaction  Abelino Saldaña MD  [facility]    Duration of visit:  15 minutes, 100% in discussion of current issues, treatment options and treatment planning.  ABELINO Saldaña MD    "

## 2017-12-08 NOTE — NURSING NOTE
"Initial /79 (BP Location: Right arm, Patient Position: Chair, Cuff Size: Adult Small)  Pulse 55  Ht 5' 6.25\" (1.683 m)  Wt 149 lb (67.6 kg)  LMP 11/21/2017  BMI 23.87 kg/m2 Estimated body mass index is 23.87 kg/(m^2) as calculated from the following:    Height as of this encounter: 5' 6.25\" (1.683 m).    Weight as of this encounter: 149 lb (67.6 kg). .      "

## 2017-12-08 NOTE — MR AVS SNAPSHOT
After Visit Summary   12/8/2017    Sharlene Natarajan    MRN: [id number]           Patient Information     Date Of Birth          1980        Visit Information        Provider Department      12/8/2017 1:45 PM Anna Saldaña MD [facility]        Today's Diagnoses     Cervical high risk HPV (human papillomavirus) test positive    -  1       Follow-ups after your visit        Your next 10 appointments already scheduled     Dec 14, 2017 10:00 AM CST   Office Visit with Dax Denney MD, [facility] 1   [facility] ([facility])    [address] [facility] [address]   [phone]           Bring a current list of meds and any records pertaining to this visit. For Physicals, please bring immunization records and any forms needing to be filled out. Please arrive 10 minutes early to complete paperwork.              Who to contact     If you have questions or need follow up information about today's clinic visit or your schedule please contact [facility] directly at [phone].  Normal or non-critical lab and imaging results will be communicated to you by [employer]hart, letter or phone within 4 business days after the clinic has received the results. If you do not hear from us within 7 days, please contact the clinic through [employer] or phone. If you have a critical or abnormal lab result, we will notify you by phone as soon as possible.  Submit refill requests through [employer] or call your pharmacy and they will forward the refill request to us. Please allow 3 business days for your refill to be completed.          Additional Information About Your Visit        [employer] Information     [employer] gives you secure access to your electronic health record. If you see a primary care provider, you can also send messages to your care team and make appointments. If you have questions, please call your primary care clinic.  If you do  "not have a primary care provider, please call [phone] and they will assist you.        Care EveryWhere ID     This is your Care EveryWhere ID. This could be used by other organizations to access your [place] medical records  [id number]        Your Vitals Were     Pulse Height Last Period BMI (Body Mass Index)          55 5' 6.25\" (1.683 m) 11/21/2017 23.87 kg/m2         Blood Pressure from Last 3 Encounters:   12/08/17 125/79   11/29/17 134/86   11/22/17 140/70    Weight from Last 3 Encounters:   12/08/17 149 lb (67.6 kg)   11/29/17 149 lb (67.6 kg)   11/22/17 153 lb 12.8 oz (69.8 kg)              Today, you had the following     No orders found for display       Primary Care Provider Office Phone # Fax #    Jackie Nohemy Olivo [phone] [address] [facility] [address]        Equal Access to Services     [facility] Center[facility] : Hadii aad ku hadasho Soomaali, waaxda luqadaha, qaybta kaalmada adeegyada, waxay elizabethin haytamin wilberto munguia . So [facility] [phone].    ATENCIÓN: Si habla español, tiene a claros disposición servicios gratuitos de asistencia lingüística. Ll[facility]ro[facility] [phone].    We comply with applicable federal civil rights laws and [place] laws. We do not discriminate on the basis of race, color, national origin, age, disability, sex, sexual orientation, or gender identity.            Thank you!     Thank you for choosing [facility]  for your care. Our goal is always to provide you with excellent care. Hearing back from our patients is one way we can continue to improve our services. Please take a few minutes to complete the written survey that you may receive in the mail after your visit with us. Thank you!             Your Updated Medication List - Protect others around you: Learn how to safely use, store and throw away your medicines at www.disposemymeds.org.          This list is accurate as of: 12/8/17  2:22 PM.  Always use your most recent med list. "                   Brand Name Dispense Instructions for use Diagnosis    * DULoxetine 60 MG EC capsule    CYMBALTA    30 capsule    Take 1 capsule (60 mg) by mouth daily    Anxiety with depression       * DULoxetine 30 MG EC capsule    CYMBALTA    15 capsule    Take 1 capsule (30 mg) by mouth daily    Anxiety with depression       hydrOXYzine 25 MG tablet    ATARAX    60 tablet    Take 1 tablet (25 mg) by mouth every 6 hours as needed for anxiety    Anxiety with depression       ibuprofen 400 MG tablet    ADVIL/MOTRIN    60 tablet    Take 1-2 tablets (400-800 mg) by mouth every 6 hours as needed (cramping.  Begin after ketorolac doses completed.  Max of 3200mg/day)    Postoperative state       order for DME     1 Units    Equipment being ordered: Dynaflex insert    Babb neuroma, right       Prenat Vit-FePoly-Methylfol-FA 29-1.13-0.4 MG Tabs      Take  by mouth.        PROBIOTIC + OMEGA-3 PO      One day        * Notice:  This list has 2 medication(s) that are the same as other medications prescribed for you. Read the directions carefully, and ask your doctor or other care provider to review them with you.

## 2017-12-14 ENCOUNTER — OFFICE VISIT (OUTPATIENT)
Dept: OBGYN | Facility: CLINIC | Age: 37
End: 2017-12-14
Payer: COMMERCIAL

## 2017-12-14 VITALS
SYSTOLIC BLOOD PRESSURE: 132 MMHG | HEART RATE: 92 BPM | BODY MASS INDEX: 24.36 KG/M2 | DIASTOLIC BLOOD PRESSURE: 82 MMHG | HEIGHT: 66 IN | WEIGHT: 151.6 LBS

## 2017-12-14 DIAGNOSIS — R87.810 CERVICAL HIGH RISK HPV (HUMAN PAPILLOMAVIRUS) TEST POSITIVE: Primary | ICD-10-CM

## 2017-12-14 PROCEDURE — 88341 IMHCHEM/IMCYTCHM EA ADD ANTB: CPT | Performed by: OBSTETRICS & GYNECOLOGY

## 2017-12-14 PROCEDURE — 57454 BX/CURETT OF CERVIX W/SCOPE: CPT | Performed by: OBSTETRICS & GYNECOLOGY

## 2017-12-14 PROCEDURE — 88342 IMHCHEM/IMCYTCHM 1ST ANTB: CPT | Performed by: OBSTETRICS & GYNECOLOGY

## 2017-12-14 PROCEDURE — 88305 TISSUE EXAM BY PATHOLOGIST: CPT | Performed by: OBSTETRICS & GYNECOLOGY

## 2017-12-14 NOTE — PROGRESS NOTES
.  Sharlene Natarajan is a 37 year old female  who presents for initial colposcopy, referred by pap rns . Pap smear 1 months ago showed: normal with HR HPV 16. The prior pap showed normal.     Past Medical History:   Diagnosis Date     Anxiety      Cervical high risk HPV (human papillomavirus) test positive 11/29/2017    type 16     Chickenpox      Chlamydia 2000     Depression      Infertility      Mild preeclampsia 2011     Family History   Problem Relation Age of Onset     CANCER Mother      cervical     HEART DISEASE Mother      MVP     Thyroid Disease Mother      GASTROINTESTINAL DISEASE Mother      IBS     Psychotic Disorder Father      bipolar     Depression Father      DIABETES Maternal Grandfather      Prostate Cancer Paternal Grandfather      HEART DISEASE Paternal Grandfather      pacemaker     Allergies Sister      Depression Sister      OSTEOPOROSIS Paternal Grandmother        Previous history of abnormal paps?: No  History of cryotherapy (freezing)?: : No  History of veneral diseases: : Chlamydia  Do you desire testing for any of these diseases? : No  History of genital warts:  No  Visible warts now?:  No  Previously treated? If so, how?:  No     Patient's last menstrual period was 11/21/2017.  Type of contraception: infertility  Age at first sexual intercourse: 19  Number of sexual partners (lifetime): 7  History   Smoking Status     Never Smoker   Smokeless Tobacco     Never Used     History of sexual abuse:  No  Allergies as of 12/14/2017 - Clive as Reviewed 12/14/2017   Allergen Reaction Noted     Penicillins Hives 08/04/2009        PROCEDURE:  Before the procedure, it was ensured that the patient was educated regarding the nature of her findings to date, the implications of them, and what was to be done. She has been made aware of the role of HPV, the natural history of infection, ways to minimize her future risk, the effect of HPV on the cervix, and treatment options available should they be  indicated. The   pathophysiology of the cervix, including a discussion of squamous vs. endometrial cells, and squamous metaplasia have all been reviewed, using illustrations and sketches. The details of the colposcopic procedure were reviewed, as well as the risks of missed diagnoses, pain, infection and bleeding. All questions were answered before proceeding, and informed consent was therefore obtained.    Bimanual examination: was not done  Unenhanced examination of the cervix was normal without lesions.  Pap smear and endocervical sampling not obtained due to:    not due  Please refer to images section for details!  Pap repeated?:  No  SCJ seen?:  yes  Endocervical speculum needed?:  No  ECC done?:  Yes   Lugol's solution used?:  Yes   Satisfactory examination?:  yes    Vaginal vault: normal to cursory inspection yes  Urethra normal?:  yes  Labia normal?:  yes  Perineum normal?:  yes  Rectum normal?:  yes    FINDINGS:  Please see image   Cervix: acetowhite area(s) at 12:00  Procedure: biopsies taken (not including ECC): 2.    Procedure summary: Patient tolerated procedure well     Assessment: WINDY 1      Plan: Specimens labelled and sent to pathology., treatment options discussed with patient, post biopsy instructions given to patient and call to discuss Pathology results

## 2017-12-14 NOTE — NURSING NOTE
"Initial Ht 5' 6.25\" (1.683 m)  Wt 151 lb 9.6 oz (68.8 kg)  LMP 11/21/2017  BMI 24.28 kg/m2 Estimated body mass index is 24.28 kg/(m^2) as calculated from the following:    Height as of this encounter: 5' 6.25\" (1.683 m).    Weight as of this encounter: 151 lb 9.6 oz (68.8 kg). .      "

## 2017-12-14 NOTE — MR AVS SNAPSHOT
"              After Visit Summary   12/14/2017    Sharlene Natarajan    MRN: [id number]           Patient Information     Date Of Birth          1980        Visit Information        Provider Department      12/14/2017 10:00 AM Dax Denney MD; [facility] 1 [facility]        Today's Diagnoses     Cervical high risk HPV (human papillomavirus) test positive    -  1       Follow-ups after your visit        Who to contact     If you have questions or need follow up information about today's clinic visit or your schedule please contact [facility] directly at [phone].  Normal or non-critical lab and imaging results will be communicated to you by MyChart, letter or phone within 4 business days after the clinic has received the results. If you do not hear from us within 7 days, please contact the clinic through [facility] or phone. If you have a critical or abnormal lab result, we will notify you by phone as soon as possible.  Submit refill requests through [employer] or call your pharmacy and they will forward the refill request to us. Please allow 3 business days for your refill to be completed.          Additional Information About Your Visit        MyChart Information     [employer] gives you secure access to your electronic health record. If you see a primary care provider, you can also send messages to your care team and make appointments. If you have questions, please call your primary care clinic.  If you do not have a primary care provider, please call [phone] and they will assist you.        Care EveryWhere ID     This is your Care EveryWhere ID. This could be used by other organizations to access your [place] medical records  [id number]        Your Vitals Were     Pulse Height Last Period BMI (Body Mass Index)          92 5' 6.25\" (1.683 m) 11/21/2017 24.28 kg/m2         Blood Pressure from Last 3 Encounters:   12/14/17 132/82   12/08/17 125/79   11/29/17 134/86    " Weight from Last 3 Encounters:   12/14/17 151 lb 9.6 oz (68.8 kg)   12/08/17 149 lb (67.6 kg)   11/29/17 149 lb (67.6 kg)              We Performed the Following     COLP CERVIX/UPPER VAGINA W BX CERVIX/ENDOCERV CURETT     Surgical pathology exam          Today's Medication Changes          These changes are accurate as of: 12/14/17 12:00 PM.  If you have any questions, ask your nurse or doctor.               These medicines have changed or have updated prescriptions.        Dose/Directions    DULoxetine 60 MG EC capsule   Commonly known as:  CYMBALTA   This may have changed:  Another medication with the same name was removed. Continue taking this medication, and follow the directions you see here.   Used for:  Anxiety with depression   Changed by:  Trevor Wise MD        Dose:  60 mg   Take 1 capsule (60 mg) by mouth daily   Quantity:  30 capsule   Refills:  3         Stop taking these medicines if you haven't already. Please contact your care team if you have questions.     order for DME   Stopped by:  Dax Denney MD                    Primary Care Provider Office Phone # Fax #    Jackie Nohemy Olivo [phone] [address] [facility] [address]        Equal Access to Services     [facility] AH: Hadii mandy perkins hadasho Soomaali, waaxda luqadaha, qaybta kaalmada adeegyada, cynthia agarwal. So [facility] [phone].    ATENCIÓN: Si habla español, tiene a claros disposición servicios gratuitos de asistencia lingüística. Llame al [phone].    We comply with applicable federal civil rights laws and [place] laws. We do not discriminate on the basis of race, color, national origin, age, disability, sex, sexual orientation, or gender identity.            Thank you!     Thank you for choosing [facility]  for your care. Our goal is always to provide you with excellent care. Hearing back from our patients is one way we can continue to improve our  services. Please take a few minutes to complete the written survey that you may receive in the mail after your visit with us. Thank you!             Your Updated Medication List - Protect others around you: Learn how to safely use, store and throw away your medicines at www.disposemymeds.org.          This list is accurate as of: 12/14/17 12:00 PM.  Always use your most recent med list.                   Brand Name Dispense Instructions for use Diagnosis    DULoxetine 60 MG EC capsule    CYMBALTA    30 capsule    Take 1 capsule (60 mg) by mouth daily    Anxiety with depression       hydrOXYzine 25 MG tablet    ATARAX    60 tablet    Take 1 tablet (25 mg) by mouth every 6 hours as needed for anxiety    Anxiety with depression       ibuprofen 400 MG tablet    ADVIL/MOTRIN    60 tablet    Take 1-2 tablets (400-800 mg) by mouth every 6 hours as needed (cramping.  Begin after ketorolac doses completed.  Max of 3200mg/day)    Postoperative state       Prenat Vit-FePoly-Methylfol-FA 29-1.13-0.4 MG Tabs      Take  by mouth.        PROBIOTIC + OMEGA-3 PO      One day

## 2017-12-20 LAB — COPATH REPORT: NORMAL

## 2017-12-29 ENCOUNTER — MYC MEDICAL ADVICE (OUTPATIENT)
Dept: OBGYN | Facility: CLINIC | Age: 37
End: 2017-12-29

## 2018-01-02 PROBLEM — D06.9 CIN III WITH SEVERE DYSPLASIA: Status: ACTIVE | Noted: 2017-11-29

## 2018-01-02 NOTE — TELEPHONE ENCOUNTER
I spoke to pt and briefly reviewed her concerns regarding the pathology result from her colposcopy.  Advised that the LEEP procedure is to remove precancerous cells of the cervix.  Pt would like to make this appt and she was transferred to [facility] to schedule this.    Abbie Carlson  [facility] RN

## 2018-02-02 ENCOUNTER — OFFICE VISIT (OUTPATIENT)
Dept: OBGYN | Facility: CLINIC | Age: 38
End: 2018-02-02
Payer: COMMERCIAL

## 2018-02-02 VITALS
RESPIRATION RATE: 18 BRPM | BODY MASS INDEX: 24.91 KG/M2 | WEIGHT: 155 LBS | TEMPERATURE: 96.9 F | SYSTOLIC BLOOD PRESSURE: 152 MMHG | DIASTOLIC BLOOD PRESSURE: 91 MMHG | HEIGHT: 66 IN | HEART RATE: 90 BPM

## 2018-02-02 DIAGNOSIS — D06.9 CIN III WITH SEVERE DYSPLASIA: Primary | ICD-10-CM

## 2018-02-02 LAB — HCG UR QL: NEGATIVE

## 2018-02-02 PROCEDURE — 81025 URINE PREGNANCY TEST: CPT | Performed by: OBSTETRICS & GYNECOLOGY

## 2018-02-02 PROCEDURE — 88307 TISSUE EXAM BY PATHOLOGIST: CPT | Performed by: OBSTETRICS & GYNECOLOGY

## 2018-02-02 PROCEDURE — 57522 CONIZATION OF CERVIX: CPT | Performed by: OBSTETRICS & GYNECOLOGY

## 2018-02-02 NOTE — PROGRESS NOTES
SUBJECTIVE:  Sharlene is a 37 year old   female who presents for LEEP procedure.  Her last Pap was Abnormal,  on 11/29/17.    She underwent colposcopy by Dr. Denney, on 1/18, results showing WINDY 2 and WINDY 3.  Patient's last menstrual period was 01/19/2018..  She is currently using nothing.  HCG negative today      PROCEDURE:    After obtaining written informed consent,  the patient was placed in the supine position, and a coated speculum placed within the vagina to visualize the cervix.  The cervix was then treated with a Lugol's solution to identify the entire transformation zone.  The cervix was then infiltrated with a mixture of 1% lidocaine/2% lidocaine with epinephrine, until anesthesia was achieved.  Utilizing a Loop cautery, the entire transformation zone was excised and sent for pathologic examination.  Hemostasis was obtained with monopolar cautery and Monsel's solution. The pt tolerated the procedure well.    ASSESSMENT:  WINDY 3    PLAN:  Will check the results of the pathology from LEEP biopsy and contact the patient.  Pap tests are recommended every  12 months x 3 yrs.  Bleeding precautions were reviewed with the patient.    Anna Saldaña MD  [facility]

## 2018-02-02 NOTE — MR AVS SNAPSHOT
"              After Visit Summary   2/2/2018    Sharlene Natarajan    MRN: [id number]           Patient Information     Date Of Birth          1980        Visit Information        Provider Department      2/2/2018 1:00 PM Anna Saldaña MD; [facility] 1 [facility]        Today's Diagnoses     WINDY III with severe dysplasia    -  1       Follow-ups after your visit        Who to contact     If you have questions or need follow up information about today's clinic visit or your schedule please contact [facility] directly at [phone].  Normal or non-critical lab and imaging results will be communicated to you by [employer], letter or phone within 4 business days after the clinic has received the results. If you do not hear from us within 7 days, please contact the clinic through [employer] or phone. If you have a critical or abnormal lab result, we will notify you by phone as soon as possible.  Submit refill requests through [employer] or call your pharmacy and they will forward the refill request to us. Please allow 3 business days for your refill to be completed.          Additional Information About Your Visit        MyChart Information     [employer] gives you secure access to your electronic health record. If you see a primary care provider, you can also send messages to your care team and make appointments. If you have questions, please call your primary care clinic.  If you do not have a primary care provider, please call [phone] and they will assist you.        Care EveryWhere ID     This is your Care EveryWhere ID. This could be used by other organizations to access your [place] medical records  [id number]        Your Vitals Were     Pulse Temperature Respirations Height Last Period BMI (Body Mass Index)    90 96.9  F (36.1  C) (Tympanic) 18 5' 6.25\" (1.683 m) 01/19/2018 24.83 kg/m2       Blood Pressure from Last 3 Encounters:   02/02/18 (!) 152/91   12/14/17 132/82 "   12/08/17 125/79    Weight from Last 3 Encounters:   02/02/18 155 lb (70.3 kg)   12/14/17 151 lb 9.6 oz (68.8 kg)   12/08/17 149 lb (67.6 kg)              We Performed the Following     HCG qualitative urine - CSC and Range     LEEP without Colposcopy     Surgical pathology exam        Primary Care Provider Office Phone # Fax #    Jackie Olivo [phone] [address] [facility] [address]        Equal Access to Services     ISIDRO GAMBLE : Hadii aad ku hadasho Soomaali, waaxda luqadaha, qaybta kaalmada adeegyada, waxay idiin hayaan adeeg whitney munguia . So [facility] [phone].    ATENCIÓN: Si habla español, tiene a claros disposición servicios gratuitos de asistencia lingüística. [facility] [phone].    We comply with applicable federal civil rights laws and [place] laws. We do not discriminate on the basis of race, color, national origin, age, disability, sex, sexual orientation, or gender identity.            Thank you!     Thank you for choosing [facility]  for your care. Our goal is always to provide you with excellent care. Hearing back from our patients is one way we can continue to improve our services. Please take a few minutes to complete the written survey that you may receive in the mail after your visit with us. Thank you!             Your Updated Medication List - Protect others around you: Learn how to safely use, store and throw away your medicines at www.disposemymeds.org.          This list is accurate as of 2/2/18  1:46 PM.  Always use your most recent med list.                   Brand Name Dispense Instructions for use Diagnosis    DULoxetine 60 MG EC capsule    CYMBALTA    30 capsule    Take 1 capsule (60 mg) by mouth daily    Anxiety with depression       hydrOXYzine 25 MG tablet    ATARAX    60 tablet    Take 1 tablet (25 mg) by mouth every 6 hours as needed for anxiety    Anxiety with depression       ibuprofen 400 MG tablet    ADVIL/MOTRIN     60 tablet    Take 1-2 tablets (400-800 mg) by mouth every 6 hours as needed (cramping.  Begin after ketorolac doses completed.  Max of 3200mg/day)    Postoperative state       Prenat Vit-FePoly-Methylfol-FA 29-1.13-0.4 MG Tabs      Take  by mouth.        PROBIOTIC + OMEGA-3 PO      One day

## 2018-02-07 LAB — COPATH REPORT: NORMAL

## 2018-02-20 ENCOUNTER — TELEPHONE (OUTPATIENT)
Dept: LAB | Facility: CLINIC | Age: 38
End: 2018-02-20

## 2018-03-26 ENCOUNTER — MYC MEDICAL ADVICE (OUTPATIENT)
Dept: FAMILY MEDICINE | Facility: CLINIC | Age: 38
End: 2018-03-26

## 2018-03-26 ENCOUNTER — OFFICE VISIT (OUTPATIENT)
Dept: FAMILY MEDICINE | Facility: CLINIC | Age: 38
End: 2018-03-26
Payer: COMMERCIAL

## 2018-03-26 VITALS
SYSTOLIC BLOOD PRESSURE: 136 MMHG | DIASTOLIC BLOOD PRESSURE: 85 MMHG | TEMPERATURE: 100.5 F | RESPIRATION RATE: 16 BRPM | HEART RATE: 113 BPM | BODY MASS INDEX: 25.78 KG/M2 | WEIGHT: 160.4 LBS | HEIGHT: 66 IN

## 2018-03-26 DIAGNOSIS — R39.89 OTHER SYMPTOMS AND SIGNS INVOLVING THE GENITOURINARY SYSTEM: ICD-10-CM

## 2018-03-26 DIAGNOSIS — R07.0 THROAT PAIN: Primary | ICD-10-CM

## 2018-03-26 DIAGNOSIS — J02.0 ACUTE STREPTOCOCCAL PHARYNGITIS: ICD-10-CM

## 2018-03-26 LAB
ALBUMIN UR-MCNC: ABNORMAL MG/DL
AMORPH CRY #/AREA URNS HPF: ABNORMAL /HPF
APPEARANCE UR: ABNORMAL
BILIRUB UR QL STRIP: NEGATIVE
COLOR UR AUTO: YELLOW
DEPRECATED S PYO AG THROAT QL EIA: ABNORMAL
GLUCOSE UR STRIP-MCNC: NEGATIVE MG/DL
HGB UR QL STRIP: ABNORMAL
KETONES UR STRIP-MCNC: NEGATIVE MG/DL
LEUKOCYTE ESTERASE UR QL STRIP: NEGATIVE
MUCOUS THREADS #/AREA URNS LPF: PRESENT /LPF
NITRATE UR QL: NEGATIVE
NON-SQ EPI CELLS #/AREA URNS LPF: ABNORMAL /LPF
PH UR STRIP: 7 PH (ref 5–7)
RBC #/AREA URNS AUTO: ABNORMAL /HPF
SOURCE: ABNORMAL
SP GR UR STRIP: 1.01 (ref 1–1.03)
SPECIMEN SOURCE: ABNORMAL
UROBILINOGEN UR STRIP-ACNC: 0.2 EU/DL (ref 0.2–1)
WBC #/AREA URNS AUTO: ABNORMAL /HPF

## 2018-03-26 PROCEDURE — 87880 STREP A ASSAY W/OPTIC: CPT | Performed by: NURSE PRACTITIONER

## 2018-03-26 PROCEDURE — 99213 OFFICE O/P EST LOW 20 MIN: CPT | Performed by: NURSE PRACTITIONER

## 2018-03-26 PROCEDURE — 81001 URINALYSIS AUTO W/SCOPE: CPT | Performed by: NURSE PRACTITIONER

## 2018-03-26 RX ORDER — AZITHROMYCIN 250 MG/1
TABLET, FILM COATED ORAL
Qty: 6 TABLET | Refills: 0 | Status: SHIPPED | OUTPATIENT
Start: 2018-03-26 | End: 2018-05-29

## 2018-03-26 NOTE — PROGRESS NOTES
SUBJECTIVE:   Sharlene Natarajan is a 38 year old female who presents to clinic today for the following health issues:    URINARY TRACT SYMPTOMS  Onset: 1 day    Description: Pt states that she started having low back pain and increased frequency with urination. Started last night.  Painful urination (Dysuria): no   Blood in urine (Hematuria): no   Delay in urine (Hesitency): no     Intensity: mild    Progression of Symptoms:  same    Accompanying Signs & Symptoms:  Fever/chills: YES- Also being tested for strep throat  Flank pain no   Nausea and vomiting: no   Any vaginal symptoms: Pt states that she is drier than usual in her vagina with mild itching.  Abdominal/Pelvic Pain: no     History:   History of frequent UTI's: YES  History of kidney stones: no   Sexually Active: YES  Possibility of pregnancy: No    Precipitating factors:   Pt is reporting low back pain with frequency.    Therapies Tried and outcome: OTC advil or tylenol     ENT Symptoms             Symptoms: cc Present Absent Comment   Fever/Chills  x  100.6   Fatigue  x     Muscle Aches  x  Back   Eye Irritation  x     Sneezing   x    Nasal Bao/Drg  x     Sinus Pressure/Pain   x    Loss of smell   x    Dental pain   x    Sore Throat x x     Swollen Glands x x     Ear Pain/Fullness   x    Cough   x    Wheeze   x    Chest Pain   x    Shortness of breath   x    Rash   x    Other  x  Headaches, loss of appetite     Symptom duration:  3 days   Symptom severity:  Getting worse   Treatments tried:  Advil   Contacts:  Possibly kids         -------------------------------------    Problem list and histories reviewed & adjusted, as indicated.  Additional history: as documented    Patient Active Problem List   Diagnosis     Female infertility     CARDIOVASCULAR SCREENING; LDL GOAL LESS THAN 130     Depression     WINDY III with severe dysplasia     Past Surgical History:   Procedure Laterality Date      SECTION  2011    Procedure: SECTION;  " Section; Surgeon:ABELINO CABAN; Location:[facility] OR      SECTION  2014    Procedure:  SECTION;   Section;  Surgeon: Abelino Caban MD;  Location: [facility] OR       Social History   Substance Use Topics     Smoking status: Never Smoker     Smokeless tobacco: Never Used     Alcohol use No     Family History   Problem Relation Age of Onset     CANCER Mother      cervical     HEART DISEASE Mother      MVP     Thyroid Disease Mother      GASTROINTESTINAL DISEASE Mother      IBS     Psychotic Disorder Father      bipolar     Depression Father      DIABETES Maternal Grandfather      Prostate Cancer Paternal Grandfather      HEART DISEASE Paternal Grandfather      pacemaker     Allergies Sister      Depression Sister      OSTEOPOROSIS Paternal Grandmother            Reviewed and updated as needed this visit by clinical staff       Reviewed and updated as needed this visit by Provider         ROS:  Constitutional, HEENT, cardiovascular, pulmonary, GI, , musculoskeletal, neuro, skin, endocrine and psych systems are negative, except as otherwise noted.    OBJECTIVE:     /85  Pulse 113  Temp 100.5  F (38.1  C) (Tympanic)  Resp 16  Ht 5' 6.25\" (1.683 m)  Wt 160 lb 6.4 oz (72.8 kg)  BMI 25.69 kg/m2  Body mass index is 25.69 kg/(m^2).  GENERAL: healthy, alert and no distress  NECK: no adenopathy, no asymmetry, masses, or scars and thyroid normal to palpation  RESP: lungs clear to auscultation - no rales, rhonchi or wheezes  CV: regular rate and rhythm, normal S1 S2, no S3 or S4, no murmur, click or rub, no peripheral edema and peripheral pulses strong  ABDOMEN: soft, nontender, no hepatosplenomegaly, no masses and bowel sounds normal  MS: no gross musculoskeletal defects noted, no edema    Diagnostic Test Results:  Results for orders placed or performed in visit on 18 (from the past 24 hour(s))   *UA reflex to Microscopic and Culture ([facility] (except " [place] and [place])   Result Value Ref Range    Color Urine Yellow     Appearance Urine Slightly Cloudy     Glucose Urine Negative NEG^Negative mg/dL    Bilirubin Urine Negative NEG^Negative    Ketones Urine Negative NEG^Negative mg/dL    Specific Gravity Urine 1.015 1.003 - 1.035    Blood Urine Trace (A) NEG^Negative    pH Urine 7.0 5.0 - 7.0 pH    Protein Albumin Urine Trace (A) NEG^Negative mg/dL    Urobilinogen Urine 0.2 0.2 - 1.0 EU/dL    Nitrite Urine Negative NEG^Negative    Leukocyte Esterase Urine Negative NEG^Negative    Source Midstream Urine    Urine Microscopic   Result Value Ref Range    WBC Urine 0 - 5 OTO5^0 - 5 /HPF    RBC Urine O - 2 OTO2^O - 2 /HPF    Squamous Epithelial /LPF Urine Few FEW^Few /LPF    Amorphous Crystals Few (A) NEG^Negative /HPF    Mucous Urine Present (A) NEG^Negative /LPF   Strep, Rapid Screen   Result Value Ref Range    Specimen Description Throat     Rapid Strep A Screen (A)      POSITIVE: Group A Streptococcal antigen detected by immunoassay.       ASSESSMENT/PLAN:       1. Acute streptococcal pharyngitis    - azithromycin (ZITHROMAX) 250 MG tablet; Two tablets first day, then one tablet daily for four days.  Dispense: 6 tablet; Refill: 0    2. Throat pain  RSS- positive for Group A strep  - Strep, Rapid Screen  - Urine Microscopic    3. Other symptoms and signs involving the genitourinary system    - *UA reflex to Microscopic and Culture ([place] [facility] (except [place] and [place])    Patient Instructions         Thank you for choosing [facility].  You may be receiving a survey in the mail from [employer] regarding your visit today.  Please take a few minutes to complete and return the survey to let us know how we are doing.      If you have questions or concerns, please contact us via [employer] or you can contact your care team at [phone].    Our Clinic hours are:  Monday 6:40 am  to 7:00 pm  Tuesday -Friday 6:40 am to 5:00 pm    [facility]  outpatient lab hours are:  Monday - Friday 6:10 am to 4:45 pm  Saturdays 7:00 am to 11:00 am  Appointments are required, call [phone]    If you have clinical questions after hours or would like to schedule an appointment,  call the clinic at [phone].    Pharyngitis: Strep (Confirmed)    You have had a positive test for strep throat. Strep throat is a contagious illness. It is spread by coughing, kissing or by touching others after touching your mouth or nose. Symptoms include throat pain that is worse with swallowing, aching all over, headache, and fever. It is treated with antibiotic medicine. This should help you start to feel better in 1 to 2 days.  Home care    Rest at home. Drink plenty of fluids to you won't get dehydrated.    No work or school for the first 2 days of taking the antibiotics. After this time, you will not be contagious. You can then return to school or work if you are feeling better.     Take antibiotic medicine for the full 10 days, even if you feel better. This is very important to ensure the infection is treated. It is also important to prevent medicine-resistant germs from developing. If you were given an antibiotic shot, you don't need any more antibiotics.    You may use acetaminophen or ibuprofen to control pain or fever, unless another medicine was prescribed for this. Talk with your doctor before taking these medicines if you have chronic liver or kidney disease. Also talk with your doctor if you have had a stomach ulcer or GI bleeding.    Throat lozenges or sprays help reduce pain. Gargling with warm saltwater will also reduce throat pain. Dissolve 1/2 teaspoon of salt in 1 glass of warm water. This may be useful just before meals.     Soft foods are OK. Avoid salty or spicy foods.  Follow-up care  Follow up with your healthcare provider or our staff if you don't get better over the next week.  When to seek medical advice  Call your healthcare provider right away if any of  these occur:    Fever of 100.4 F (38 C) or higher, or as directed by your healthcare provider    New or worsening ear pain, sinus pain, or headache    Painful lumps in the back of neck    Stiff neck    Lymph nodes getting larger or becoming soft in the middle    You can't swallow liquids or you can't open your mouth wide because of throat pain    Signs of dehydration. These include very dark urine or no urine, sunken eyes, and dizziness.    Trouble breathing or noisy breathing    Muffled voice    Rash  Date Last Reviewed: 4/13/2015 2000-2017 [employer]. [address] [place] [address]. All rights reserved. This information is not intended as a substitute for professional medical care. Always follow your healthcare professional's instructions.            AZALIA [facility]

## 2018-03-26 NOTE — PATIENT INSTRUCTIONS
Thank you for choosing [facility].  You may be receiving a survey in the mail from [employer] regarding your visit today.  Please take a few minutes to complete and return the survey to let us know how we are doing.      If you have questions or concerns, please contact us via [employer] or you can contact your care team at [phone].    Our Clinic hours are:  Monday 6:40 am  to 7:00 pm  Tuesday -Friday 6:40 am to 5:00 pm    The [place] outpatient lab hours are:  Monday - Friday 6:10 am to 4:45 pm  Saturdays 7:00 am to 11:00 am  Appointments are required, call [phone]    If you have clinical questions after hours or would like to schedule an appointment,  call the clinic at [phone].    Pharyngitis: Strep (Confirmed)    You have had a positive test for strep throat. Strep throat is a contagious illness. It is spread by coughing, kissing or by touching others after touching your mouth or nose. Symptoms include throat pain that is worse with swallowing, aching all over, headache, and fever. It is treated with antibiotic medicine. This should help you start to feel better in 1 to 2 days.  Home care    Rest at home. Drink plenty of fluids to you won't get dehydrated.    No work or school for the first 2 days of taking the antibiotics. After this time, you will not be contagious. You can then return to school or work if you are feeling better.     Take antibiotic medicine for the full 10 days, even if you feel better. This is very important to ensure the infection is treated. It is also important to prevent medicine-resistant germs from developing. If you were given an antibiotic shot, you don't need any more antibiotics.    You may use acetaminophen or ibuprofen to control pain or fever, unless another medicine was prescribed for this. Talk with your doctor before taking these medicines if you have chronic liver or kidney disease. Also talk with your doctor if you have had a stomach ulcer or GI  bleeding.    Throat lozenges or sprays help reduce pain. Gargling with warm saltwater will also reduce throat pain. Dissolve 1/2 teaspoon of salt in 1 glass of warm water. This may be useful just before meals.     Soft foods are OK. Avoid salty or spicy foods.  Follow-up care  Follow up with your healthcare provider or our staff if you don't get better over the next week.  When to seek medical advice  Call your healthcare provider right away if any of these occur:    Fever of 100.4 F (38 C) or higher, or as directed by your healthcare provider    New or worsening ear pain, sinus pain, or headache    Painful lumps in the back of neck    Stiff neck    Lymph nodes getting larger or becoming soft in the middle    You can't swallow liquids or you can't open your mouth wide because of throat pain    Signs of dehydration. These include very dark urine or no urine, sunken eyes, and dizziness.    Trouble breathing or noisy breathing    Muffled voice    Rash  Date Last Reviewed: 4/13/2015 2000-2017 [employer]. [address] [place] [address], [place] [address]. All rights reserved. This information is not intended as a substitute for professional medical care. Always follow your healthcare professional's instructions.

## 2018-03-26 NOTE — NURSING NOTE
"Chief Complaint   Patient presents with     Pharyngitis     UTI       Initial /85  Pulse 113  Temp 100.5  F (38.1  C) (Tympanic)  Resp 16  Ht 5' 6.25\" (1.683 m)  Wt 160 lb 6.4 oz (72.8 kg)  BMI 25.69 kg/m2 Estimated body mass index is 25.69 kg/(m^2) as calculated from the following:    Height as of this encounter: 5' 6.25\" (1.683 m).    Weight as of this encounter: 160 lb 6.4 oz (72.8 kg).  Medication Reconciliation: complete    "

## 2018-03-26 NOTE — MR AVS SNAPSHOT
After Visit Summary   3/26/2018    Sharlene Natarajan    MRN: [id number]           Patient Information     Date Of Birth          1980        Visit Information        Provider Department      3/26/2018 9:20 AM Dimple Mc APRN [facility]        Today's Diagnoses     Throat pain    -  1    Other symptoms and signs involving the genitourinary system        Acute streptococcal pharyngitis          Care Instructions          Thank you for choosing [facility] (formerly [facility])[3].  You may be receiving a survey in the mail from Carmella Paris regarding your visit today.  Please take a few minutes to complete and return the survey to let us know how we are doing.      If you have questions or concerns, please contact us via [employer] or you can contact your care team at [phone].    Our Clinic hours are:  Monday 6:40 am  to 7:00 pm  Tuesday -Friday 6:40 am to 5:00 pm    The [place] outpatient lab hours are:  Monday - Friday 6:10 am to 4:45 pm  Saturdays 7:00 am to 11:00 am  Appointments are required, call [phone]    If you have clinical questions after hours or would like to schedule an appointment,  call the clinic at [phone].    Pharyngitis: Strep (Confirmed)    You have had a positive test for strep throat. Strep throat is a contagious illness. It is spread by coughing, kissing or by touching others after touching your mouth or nose. Symptoms include throat pain that is worse with swallowing, aching all over, headache, and fever. It is treated with antibiotic medicine. This should help you start to feel better in 1 to 2 days.  Home care    Rest at home. Drink plenty of fluids to you won't get dehydrated.    No work or school for the first 2 days of taking the antibiotics. After this time, you will not be contagious. You can then return to school or work if you are feeling better.     Take antibiotic medicine for the full 10 days, even if you feel better. This is very important to  ensure the infection is treated. It is also important to prevent medicine-resistant germs from developing. If you were given an antibiotic shot, you don't need any more antibiotics.    You may use acetaminophen or ibuprofen to control pain or fever, unless another medicine was prescribed for this. Talk with your doctor before taking these medicines if you have chronic liver or kidney disease. Also talk with your doctor if you have had a stomach ulcer or GI bleeding.    Throat lozenges or sprays help reduce pain. Gargling with warm saltwater will also reduce throat pain. Dissolve 1/2 teaspoon of salt in 1 glass of warm water. This may be useful just before meals.     Soft foods are OK. Avoid salty or spicy foods.  Follow-up care  Follow up with your healthcare provider or our staff if you don't get better over the next week.  When to seek medical advice  Call your healthcare provider right away if any of these occur:    Fever of 100.4 F (38 C) or higher, or as directed by your healthcare provider    New or worsening ear pain, sinus pain, or headache    Painful lumps in the back of neck    Stiff neck    Lymph nodes getting larger or becoming soft in the middle    You can't swallow liquids or you can't open your mouth wide because of throat pain    Signs of dehydration. These include very dark urine or no urine, sunken eyes, and dizziness.    Trouble breathing or noisy breathing    Muffled voice    Rash  Date Last Reviewed: 4/13/2015 2000-2017 [employer]. [address] [place] [address], [place] [address]. All rights reserved. This information is not intended as a substitute for professional medical care. Always follow your healthcare professional's instructions.                Follow-ups after your visit        Who to contact     If you have questions or need follow up information about today's clinic visit or your schedule please contact [facility] directly at [phone].  Normal or  "non-critical lab and imaging results will be communicated to you by MyChart, letter or phone within 4 business days after the clinic has received the results. If you do not hear from us within 7 days, please contact the clinic through [employer] or phone. If you have a critical or abnormal lab result, we will notify you by phone as soon as possible.  Submit refill requests through [employer] or call your pharmacy and they will forward the refill request to us. Please allow 3 business days for your refill to be completed.          Additional Information About Your Visit        [employer] Information     [employer] gives you secure access to your electronic health record. If you see a primary care provider, you can also send messages to your care team and make appointments. If you have questions, please call your primary care clinic.  If you do not have a primary care provider, please call [phone] and they will assist you.        Care EveryWhere ID     This is your Care EveryWhere ID. This could be used by other organizations to access your [place] medical records  [id number]        Your Vitals Were     Pulse Temperature Respirations Height BMI (Body Mass Index)       113 100.5  F (38.1  C) (Tympanic) 16 5' 6.25\" (1.683 m) 25.69 kg/m2        Blood Pressure from Last 3 Encounters:   03/26/18 136/85   02/02/18 (!) 152/91   12/14/17 132/82    Weight from Last 3 Encounters:   03/26/18 160 lb 6.4 oz (72.8 kg)   02/02/18 155 lb (70.3 kg)   12/14/17 151 lb 9.6 oz (68.8 kg)              We Performed the Following     *UA reflex to Microscopic and Culture ([place] [facility] [place] [facility] (except [place] and [place])     Strep, Rapid Screen     Urine Microscopic          Today's Medication Changes          These changes are accurate as of 3/26/18  9:52 AM.  If you have any questions, ask your nurse or doctor.               Start taking these medicines.        Dose/Directions    azithromycin 250 MG tablet   Commonly known as:  " ZITHROMAX   Used for:  Acute streptococcal pharyngitis        Two tablets first day, then one tablet daily for four days.   Quantity:  6 tablet   Refills:  0            Where to get your medicines      These medications were sent to [facility] - [place], [place] - [address]  [address] [address]     Phone:  [phone]     azithromycin 250 MG tablet                Primary Care Provider Office Phone # Fax #    Jackie Olivo [phone] [address] [facility] [address]        Equal Access to Services     [facility]: Hadii aad ku hadasho Soomaali, waaxda luqadaha, qaybta kaalmada adeegyada, waxchris meier hayjennifer munguia . So [facility] [phone].    ATENCIÓN: Si habla español, tiene a claros disposición servicios gratuitos de asistencia lingüística. [facility] [phone].    We comply with applicable federal civil rights laws and [place] laws. We do not discriminate on the basis of race, color, national origin, age, disability, sex, sexual orientation, or gender identity.            Thank you!     Thank you for choosing [facility]  for your care. Our goal is always to provide you with excellent care. Hearing back from our patients is one way we can continue to improve our services. Please take a few minutes to complete the written survey that you may receive in the mail after your visit with us. Thank you!             Your Updated Medication List - Protect others around you: Learn how to safely use, store and throw away your medicines at www.disposemymeds.org.          This list is accurate as of 3/26/18  9:52 AM.  Always use your most recent med list.                   Brand Name Dispense Instructions for use Diagnosis    azithromycin 250 MG tablet    ZITHROMAX    6 tablet    Two tablets first day, then one tablet daily for four days.    Acute streptococcal pharyngitis       DULoxetine 60 MG EC capsule    CYMBALTA    30  capsule    Take 1 capsule (60 mg) by mouth daily    Anxiety with depression       hydrOXYzine 25 MG tablet    ATARAX    60 tablet    Take 1 tablet (25 mg) by mouth every 6 hours as needed for anxiety    Anxiety with depression       ibuprofen 400 MG tablet    ADVIL/MOTRIN    60 tablet    Take 1-2 tablets (400-800 mg) by mouth every 6 hours as needed (cramping.  Begin after ketorolac doses completed.  Max of 3200mg/day)    Postoperative state       Prenat Vit-FePoly-Methylfol-FA 29-1.13-0.4 MG Tabs      Take  by mouth.        PROBIOTIC + OMEGA-3 PO      One day

## 2018-03-27 ENCOUNTER — MYC MEDICAL ADVICE (OUTPATIENT)
Dept: FAMILY MEDICINE | Facility: CLINIC | Age: 38
End: 2018-03-27

## 2018-04-17 ENCOUNTER — MYC MEDICAL ADVICE (OUTPATIENT)
Dept: FAMILY MEDICINE | Facility: CLINIC | Age: 38
End: 2018-04-17

## 2018-04-17 DIAGNOSIS — J02.0 ACUTE STREPTOCOCCAL PHARYNGITIS: ICD-10-CM

## 2018-04-17 DIAGNOSIS — F41.8 ANXIETY WITH DEPRESSION: ICD-10-CM

## 2018-04-17 NOTE — TELEPHONE ENCOUNTER
Left a VM for pt to call clinic to discuss.  Was prescribed a 90 day supply of Cymbalta in November.  How has she been taking medication?    Needs a new PHQ9 and GAD7.    Ana MERLOS RN

## 2018-04-18 NOTE — TELEPHONE ENCOUNTER
DULoxetine (CYMBALTA) 60 MG EC capsule 30 capsule 3 refills 11/22/2017  --   Sig: Take 1 capsule (60 mg) by mouth daily     I sent her the PHQ9 and GAD7 via [employer] , and asked her via [employer] to clarify is this med she is referring to? And if so, we can fill short supply until her appt. Waiting for her response via new/ separate  [employer] note sent with the questionnaires and questions.   Lisa Poe RNC

## 2018-04-19 RX ORDER — DULOXETIN HYDROCHLORIDE 60 MG/1
60 CAPSULE, DELAYED RELEASE ORAL DAILY
Qty: 30 CAPSULE | Refills: 0 | Status: SHIPPED | OUTPATIENT
Start: 2018-04-19 | End: 2018-05-29

## 2018-04-19 ASSESSMENT — ANXIETY QUESTIONNAIRES
IF YOU CHECKED OFF ANY PROBLEMS ON THIS QUESTIONNAIRE, HOW DIFFICULT HAVE THESE PROBLEMS MADE IT FOR YOU TO DO YOUR WORK, TAKE CARE OF THINGS AT HOME, OR GET ALONG WITH OTHER PEOPLE: SOMEWHAT DIFFICULT
1. FEELING NERVOUS, ANXIOUS, OR ON EDGE: MORE THAN HALF THE DAYS
2. NOT BEING ABLE TO STOP OR CONTROL WORRYING: SEVERAL DAYS
7. FEELING AFRAID AS IF SOMETHING AWFUL MIGHT HAPPEN: NOT AT ALL
GAD7 TOTAL SCORE: 7
5. BEING SO RESTLESS THAT IT IS HARD TO SIT STILL: SEVERAL DAYS
6. BECOMING EASILY ANNOYED OR IRRITABLE: NOT AT ALL
3. WORRYING TOO MUCH ABOUT DIFFERENT THINGS: SEVERAL DAYS

## 2018-04-19 ASSESSMENT — PATIENT HEALTH QUESTIONNAIRE - PHQ9: 5. POOR APPETITE OR OVEREATING: MORE THAN HALF THE DAYS

## 2018-04-19 NOTE — TELEPHONE ENCOUNTER
I called and spoke pt.  Updated phq9 and MARLENE.    Refilled until seen.    Pt states that she has not had a break in medication.  Pt explains that she started out at Cymbalta, 60 mg daily but it felt a little strong initially.  Dr Saldaña reduced her to 30 mg daily for a while and then pt titrated up to 60 mg a day.    Medication is being filled for 1 time refill only due to:  Patient needs to be seen because she is over-due for follow up.    Olive Hoffman RN    MARLENE-7 SCORE 6/10/2014 11/22/2017 4/19/2018   Total Score 14 - -   Total Score - 19 7     PHQ-9 SCORE 6/10/2014 11/22/2017 4/19/2018   Total Score 12 - -   Total Score - 18 11

## 2018-04-20 ASSESSMENT — PATIENT HEALTH QUESTIONNAIRE - PHQ9: SUM OF ALL RESPONSES TO PHQ QUESTIONS 1-9: 11

## 2018-04-20 ASSESSMENT — ANXIETY QUESTIONNAIRES: GAD7 TOTAL SCORE: 7

## 2018-05-29 ENCOUNTER — OFFICE VISIT (OUTPATIENT)
Dept: FAMILY MEDICINE | Facility: CLINIC | Age: 38
End: 2018-05-29
Payer: COMMERCIAL

## 2018-05-29 VITALS
TEMPERATURE: 98.4 F | DIASTOLIC BLOOD PRESSURE: 68 MMHG | OXYGEN SATURATION: 95 % | WEIGHT: 163.6 LBS | SYSTOLIC BLOOD PRESSURE: 142 MMHG | HEART RATE: 96 BPM | HEIGHT: 66 IN | BODY MASS INDEX: 26.29 KG/M2

## 2018-05-29 DIAGNOSIS — I10 ESSENTIAL HYPERTENSION, BENIGN: ICD-10-CM

## 2018-05-29 DIAGNOSIS — Z13.220 SCREENING CHOLESTEROL LEVEL: ICD-10-CM

## 2018-05-29 DIAGNOSIS — F41.8 DEPRESSION WITH ANXIETY: Primary | ICD-10-CM

## 2018-05-29 LAB
CHOLEST SERPL-MCNC: 203 MG/DL
HDLC SERPL-MCNC: 43 MG/DL
LDLC SERPL CALC-MCNC: 120 MG/DL
NONHDLC SERPL-MCNC: 160 MG/DL
TRIGL SERPL-MCNC: 198 MG/DL

## 2018-05-29 PROCEDURE — 80061 LIPID PANEL: CPT | Performed by: INTERNAL MEDICINE

## 2018-05-29 PROCEDURE — 36415 COLL VENOUS BLD VENIPUNCTURE: CPT | Performed by: INTERNAL MEDICINE

## 2018-05-29 PROCEDURE — 99214 OFFICE O/P EST MOD 30 MIN: CPT | Performed by: INTERNAL MEDICINE

## 2018-05-29 RX ORDER — DULOXETIN HYDROCHLORIDE 30 MG/1
90 CAPSULE, DELAYED RELEASE ORAL DAILY
Qty: 270 CAPSULE | Refills: 3 | Status: SHIPPED | OUTPATIENT
Start: 2018-05-29 | End: 2019-03-22

## 2018-05-29 ASSESSMENT — ANXIETY QUESTIONNAIRES
7. FEELING AFRAID AS IF SOMETHING AWFUL MIGHT HAPPEN: NOT AT ALL
5. BEING SO RESTLESS THAT IT IS HARD TO SIT STILL: SEVERAL DAYS
3. WORRYING TOO MUCH ABOUT DIFFERENT THINGS: MORE THAN HALF THE DAYS
1. FEELING NERVOUS, ANXIOUS, OR ON EDGE: NEARLY EVERY DAY
6. BECOMING EASILY ANNOYED OR IRRITABLE: MORE THAN HALF THE DAYS
2. NOT BEING ABLE TO STOP OR CONTROL WORRYING: SEVERAL DAYS
IF YOU CHECKED OFF ANY PROBLEMS ON THIS QUESTIONNAIRE, HOW DIFFICULT HAVE THESE PROBLEMS MADE IT FOR YOU TO DO YOUR WORK, TAKE CARE OF THINGS AT HOME, OR GET ALONG WITH OTHER PEOPLE: SOMEWHAT DIFFICULT
GAD7 TOTAL SCORE: 12

## 2018-05-29 ASSESSMENT — PATIENT HEALTH QUESTIONNAIRE - PHQ9: 5. POOR APPETITE OR OVEREATING: NEARLY EVERY DAY

## 2018-05-29 NOTE — PATIENT INSTRUCTIONS
Follow-up in 1-2 months if not feeling improved.      Return in about 3 months for a free nurse blood pressure check after working on some lifestyle changes.      Controlling High Blood Pressure  High blood pressure (hypertension) is often called the silent killer. This is because many people who have it don t know it. High blood pressure can raise your risk of heart attack, stroke, and heart failure. Controlling your blood pressure can decrease your risk of these problems. Know your blood pressure and remember to check it regularly. Doing so can save your life.  Blood pressure measurements are given as 2 numbers. Systolic blood pressure is the upper number. This is the pressure when the heart contracts. Diastolic blood pressure is the lower number. This is the pressure when the heart relaxes between beats.  Blood pressure is categorized as normal, elevated, or stage 1 or stage 2 high blood pressure:    Normal blood pressure is systolic of less than 120 and diastolic of less than 80 (120/80)    Elevated blood pressure is systolic of 120 to 129 and diastolic less than 80    Stage 1 high blood pressure is systolic is 130 to 139 or diastolic between 80 to 89    Stage 2 high blood pressure is when systolic is 140 or higher or the diastolic is 90 or higher  Here are some things you can do to help control your blood pressure.    Choose heart-healthy foods    Select low-salt, low-fat foods. Limit sodium intake to 2,400 mg per day or the amount suggested by your healthcare provider.    Limit canned, dried, cured, packaged, and fast foods. These can contain a lot of salt.    Eat 8 to 10 servings of fruits and vegetables every day.    Choose lean meats, fish, or chicken.    Eat whole-grain pasta, brown rice, and beans.    Eat 2 to 3 servings of low-fat or fat-free dairy products.    Ask your doctor about the DASH eating plan. This plan helps reduce blood pressure.    When you go to a restaurant, ask that your meal be prepared  with no added salt.  Maintain a healthy weight    Ask your healthcare provider how many calories to eat a day. Then stick to that number.    Ask your healthcare provider what weight range is healthiest for you. If you are overweight, a weight loss of only 3% to 5% of your body weight can help lower blood pressure. Generally, a good weight loss goal is to lose 10% of your body weight in a year.    Limit snacks and sweets.    Get regular exercise.  Get up and get active    Choose activities you enjoy. Find ones you can do with friends or family. This includes bicycling, dancing, walking, and jogging.    Park farther away from building entrances.    Use stairs instead of the elevator.    When you can, walk or bike instead of driving.    [place] leaves, garden, or do household repairs.    Be active at a moderate to vigorous level of physical activity for at least 40 minutes for a minimum of 3 to 4 days a week.   Manage stress    Make time to relax and enjoy life. Find time to laugh.    Communicate your concerns with your loved ones and your healthcare provider.    Visit with family and friends, and keep up with hobbies.  Limit alcohol and quit smoking    Men should have no more than 2 drinks per day.    Women should have no more than 1 drink per day.    Talk with your healthcare provider about quitting smoking. Smoking significantly increases your risk for heart disease and stroke. Ask your healthcare provider about community smoking cessation programs and other options.  Medicines  If lifestyle changes aren t enough, your healthcare provider may prescribe high blood pressure medicine. Take all medicines as prescribed. If you have any questions about your medicines, ask your healthcare provider before stopping or changing them.   Date Last Reviewed: 4/27/2016 2000-2017 [employer]. [address], [place] [address]. All rights reserved. This information is not intended as a substitute for  professional medical care. Always follow your healthcare professional's instructions.

## 2018-05-29 NOTE — PROGRESS NOTES
SUBJECTIVE:   Sharlene Natarajan is a 38 year old female who presents to clinic today for the following health issues:  Chief Complaint   Patient presents with     Depression     Anxiety     Depression and Anxiety Follow-Up    Status since last visit: Improved, but still present.  She is feeling rather overwhelmed. Going through a bit at home right now.  Patient is in school, spouse got new job and they are relocating.      Other associated symptoms: tingling in feet. Some weight gain.     Complicating factors:     Significant life event: Yes-  Spouse's job change, patient back in school, mother recently had MI     Current substance abuse: None    PHQ-9 2017   Total Score 18 11   Q9: Suicide Ideation Not at all Not at all     MARLENE-7 SCORE 6/10/2014 2017 2018   Total Score 14 - -   Total Score - 19 7     PHQ-9  English  PHQ-9   Any Language  MARLENE-7  Suicide Assessment Five-step Evaluation and Treatment (SAFE-T)      Amount of exercise or physical activity: 4-5 days/week for an average of 30-45 minutes    Problems taking medications regularly: No    Medication side effects: may be making patient more lethargic.  Having difficulty being awake, way more tired. Sleeping well at night.      Diet: regular (no restrictions), recently trying to avoid carbohydrates.       Problem list and histories reviewed & adjusted, as indicated.  Additional history: as documented    Patient Active Problem List   Diagnosis     Female infertility     CARDIOVASCULAR SCREENING; LDL GOAL LESS THAN 130     Depression     WINDY III with severe dysplasia     Past Surgical History:   Procedure Laterality Date      SECTION  2011    Procedure: SECTION;  Section; Surgeon:ANNA CABAN; Location:[facility] OR      SECTION  2014    Procedure:  SECTION;   Section;  Surgeon: Anna Caban MD;  Location: [facility] OR       Social History   Substance Use Topics     Smoking  "status: Never Smoker     Smokeless tobacco: Never Used     Alcohol use No     Family History   Problem Relation Age of Onset     CANCER Mother      cervical     HEART DISEASE Mother      MVP     Thyroid Disease Mother      GASTROINTESTINAL DISEASE Mother      IBS     DIABETES Mother      Hypertension Mother      Myocardial Infarction Mother      Psychotic Disorder Father      bipolar     Depression Father      DIABETES Maternal Grandfather      Prostate Cancer Paternal Grandfather      HEART DISEASE Paternal Grandfather      pacemaker     Allergies Sister      Depression Sister      OSTEOPOROSIS Paternal Grandmother          Current Outpatient Prescriptions   Medication Sig Dispense Refill     DULoxetine (CYMBALTA) 30 MG EC capsule Take 3 capsules (90 mg) by mouth daily 270 capsule 3     hydrOXYzine (ATARAX) 25 MG tablet Take 1 tablet (25 mg) by mouth every 6 hours as needed for anxiety 60 tablet 1     ibuprofen (ADVIL,MOTRIN) 400 MG tablet Take 1-2 tablets (400-800 mg) by mouth every 6 hours as needed (cramping.  Begin after ketorolac doses completed.  Max of 3200mg/day) 60 tablet 1     Prenat Vit-FePoly-Methylfol-FA 29-1.13-0.4 MG TABS Take  by mouth.       Probiotic Product (PROBIOTIC + OMEGA-3 PO) One day       [DISCONTINUED] DULoxetine (CYMBALTA) 60 MG EC capsule Take 1 capsule (60 mg) by mouth daily 30 capsule 0     Allergies   Allergen Reactions     Penicillins Hives       Reviewed and updated as needed this visit by clinical staff  Tobacco  Allergies  Med Hx  Surg Hx  Fam Hx  Soc Hx      Reviewed and updated as needed this visit by Provider         ROS:  Constitutional, psych systems are negative, except as otherwise noted.    OBJECTIVE:     /68 (BP Location: Right arm, Patient Position: Chair, Cuff Size: Adult Regular)  Pulse 96  Temp 98.4  F (36.9  C) (Tympanic)  Ht 5' 6.25\" (1.683 m)  Wt 163 lb 9.6 oz (74.2 kg)  SpO2 95%  BMI 26.21 kg/m2  Body mass index is 26.21 kg/(m^2).  GENERAL: " healthy, alert and no distress  PSYCH: mentation appears normal, affect normal/bright      ASSESSMENT/PLAN:         1. Depression with anxiety    Improved with duloxetine 60mg but still having some symptoms.  She had difficulty with side effects with starting medication and would prefer not to switch with the fear that she would get side effects changing meds, so we will try increasing duloxetine to 90mg.  Had not needed to take hydroxyzine at all.  See is occasionally seeing a therapist near her work place.  Follow-up in 1-2 months if not improving.     - DULoxetine (CYMBALTA) 30 MG EC capsule; Take 3 capsules (90 mg) by mouth daily  Dispense: 270 capsule; Refill: 3    2. Essential hypertension, benign    She has had a few elevated readings now, has never been on meds.  She would prefer to work on lifestyle changes before starting medication.  Follow-up in 3 months for a RN BP check.     3. Screening cholesterol level    - Lipid panel reflex to direct LDL Fasting      Trevor Wise MD  [facility]

## 2018-05-29 NOTE — MR AVS SNAPSHOT
After Visit Summary   5/29/2018    Sharlene Natarajan    MRN: [id number]           Patient Information     Date Of Birth          1980        Visit Information        Provider Department      5/29/2018 9:20 AM Trevor Wise MD [facility]        Today's Diagnoses     Depression with anxiety    -  1    Screening cholesterol level          Care Instructions    Follow-up in 1-2 months if not feeling improved.      Return in about 3 months for a free nurse blood pressure check after working on some lifestyle changes.      Controlling High Blood Pressure  High blood pressure (hypertension) is often called the silent killer. This is because many people who have it don t know it. High blood pressure can raise your risk of heart attack, stroke, and heart failure. Controlling your blood pressure can decrease your risk of these problems. Know your blood pressure and remember to check it regularly. Doing so can save your life.  Blood pressure measurements are given as 2 numbers. Systolic blood pressure is the upper number. This is the pressure when the heart contracts. Diastolic blood pressure is the lower number. This is the pressure when the heart relaxes between beats.  Blood pressure is categorized as normal, elevated, or stage 1 or stage 2 high blood pressure:    Normal blood pressure is systolic of less than 120 and diastolic of less than 80 (120/80)    Elevated blood pressure is systolic of 120 to 129 and diastolic less than 80    Stage 1 high blood pressure is systolic is 130 to 139 or diastolic between 80 to 89    Stage 2 high blood pressure is when systolic is 140 or higher or the diastolic is 90 or higher  Here are some things you can do to help control your blood pressure.    Choose heart-healthy foods    Select low-salt, low-fat foods. Limit sodium intake to 2,400 mg per day or the amount suggested by your healthcare provider.    Limit canned, dried, cured, packaged, and fast  foods. These can contain a lot of salt.    Eat 8 to 10 servings of fruits and vegetables every day.    Choose lean meats, fish, or chicken.    Eat whole-grain pasta, brown rice, and beans.    Eat 2 to 3 servings of low-fat or fat-free dairy products.    Ask your doctor about the DASH eating plan. This plan helps reduce blood pressure.    When you go to a restaurant, ask that your meal be prepared with no added salt.  Maintain a healthy weight    Ask your healthcare provider how many calories to eat a day. Then stick to that number.    Ask your healthcare provider what weight range is healthiest for you. If you are overweight, a weight loss of only 3% to 5% of your body weight can help lower blood pressure. Generally, a good weight loss goal is to lose 10% of your body weight in a year.    Limit snacks and sweets.    Get regular exercise.  Get up and get active    Choose activities you enjoy. Find ones you can do with friends or family. This includes bicycling, dancing, walking, and jogging.    Park farther away from building entrances.    Use stairs instead of the elevator.    When you can, walk or bike instead of driving.    [place] leaves, garden, or do household repairs.    Be active at a moderate to vigorous level of physical activity for at least 40 minutes for a minimum of 3 to 4 days a week.   Manage stress    Make time to relax and enjoy life. Find time to laugh.    Communicate your concerns with your loved ones and your healthcare provider.    Visit with family and friends, and keep up with hobbies.  Limit alcohol and quit smoking    Men should have no more than 2 drinks per day.    Women should have no more than 1 drink per day.    Talk with your healthcare provider about quitting smoking. Smoking significantly increases your risk for heart disease and stroke. Ask your healthcare provider about community smoking cessation programs and other options.  Medicines  If lifestyle changes aren t enough, your  "healthcare provider may prescribe high blood pressure medicine. Take all medicines as prescribed. If you have any questions about your medicines, ask your healthcare provider before stopping or changing them.   Date Last Reviewed: 4/27/2016 2000-2017 [employer]. [address] [place] [address], [place] [address]. All rights reserved. This information is not intended as a substitute for professional medical care. Always follow your healthcare professional's instructions.                Follow-ups after your visit        Who to contact     If you have questions or need follow up information about today's clinic visit or your schedule please contact [facility] directly at [phone].  Normal or non-critical lab and imaging results will be communicated to you by MyChart, letter or phone within 4 business days after the clinic has received the results. If you do not hear from us within 7 days, please contact the clinic through [employer] or phone. If you have a critical or abnormal lab result, we will notify you by phone as soon as possible.  Submit refill requests through [employer] or call your pharmacy and they will forward the refill request to us. Please allow 3 business days for your refill to be completed.          Additional Information About Your Visit        [email or web address] Information     [employer] gives you secure access to your electronic health record. If you see a primary care provider, you can also send messages to your care team and make appointments. If you have questions, please call your primary care clinic.  If you do not have a primary care provider, please call [phone] and they will assist you.        Care EveryWhere ID     This is your Care EveryWhere ID. This could be used by other organizations to access your [facility] medical records  [id number]        Your Vitals Were     Pulse Temperature Height Pulse Oximetry BMI (Body Mass Index)       96 98.4  F (36.9  C) (Tympanic) 5' 6.25\" " (1.683 m) 95% 26.21 kg/m2        Blood Pressure from Last 3 Encounters:   05/29/18 142/68   03/26/18 136/85   02/02/18 (!) 152/91    Weight from Last 3 Encounters:   05/29/18 163 lb 9.6 oz (74.2 kg)   03/26/18 160 lb 6.4 oz (72.8 kg)   02/02/18 155 lb (70.3 kg)              We Performed the Following     Lipid panel reflex to direct LDL Fasting          Today's Medication Changes          These changes are accurate as of 5/29/18 10:00 AM.  If you have any questions, ask your nurse or doctor.               These medicines have changed or have updated prescriptions.        Dose/Directions    DULoxetine 30 MG EC capsule   Commonly known as:  CYMBALTA   This may have changed:    - medication strength  - how much to take   Used for:  Depression with anxiety   Changed by:  Trevor Wise MD        Dose:  90 mg   Take 3 capsules (90 mg) by mouth daily   Quantity:  270 capsule   Refills:  3         Stop taking these medicines if you haven't already. Please contact your care team if you have questions.     azithromycin 250 MG tablet   Commonly known as:  ZITHROMAX   Stopped by:  Trevor Wise MD                Where to get your medicines      These medications were sent to [facility] [address] [facility]  [address] [place] [address]     Phone:  [phone]     DULoxetine 30 MG EC capsule                Primary Care Provider Office Phone # Fax #    Jackie Nohemy Olivo [phone] [address] [facility] [address]        Equal Access to Services     ISIDRO GAMBLE AH: Hadii aad ku hadasho Soomaali, waaxda luqadaha, qaybta kaalmada adeegyada, cynthia idimonica agarwal. So [facility] [phone].    ATENCIÓN: Si habla español, tiene a claros disposición servicios gratuitos de asistencia lingüística. Llame al [phone].    We comply with applicable federal civil rights laws and [place] laws. We do not discriminate on the basis of race, color, national  origin, age, disability, sex, sexual orientation, or gender identity.            Thank you!     Thank you for choosing [facility]  for your care. Our goal is always to provide you with excellent care. Hearing back from our patients is one way we can continue to improve our services. Please take a few minutes to complete the written survey that you may receive in the mail after your visit with us. Thank you!             Your Updated Medication List - Protect others around you: Learn how to safely use, store and throw away your medicines at www.disposemymeds.org.          This list is accurate as of 5/29/18 10:00 AM.  Always use your most recent med list.                   Brand Name Dispense Instructions for use Diagnosis    DULoxetine 30 MG EC capsule    CYMBALTA    270 capsule    Take 3 capsules (90 mg) by mouth daily    Depression with anxiety       hydrOXYzine 25 MG tablet    ATARAX    60 tablet    Take 1 tablet (25 mg) by mouth every 6 hours as needed for anxiety    Anxiety with depression       ibuprofen 400 MG tablet    ADVIL/MOTRIN    60 tablet    Take 1-2 tablets (400-800 mg) by mouth every 6 hours as needed (cramping.  Begin after ketorolac doses completed.  Max of 3200mg/day)    Postoperative state       Prenat Vit-FePoly-Methylfol-FA 29-1.13-0.4 MG Tabs      Take  by mouth.        PROBIOTIC + OMEGA-3 PO      One day

## 2018-05-30 ASSESSMENT — PATIENT HEALTH QUESTIONNAIRE - PHQ9: SUM OF ALL RESPONSES TO PHQ QUESTIONS 1-9: 12

## 2018-05-30 ASSESSMENT — ANXIETY QUESTIONNAIRES: GAD7 TOTAL SCORE: 12

## 2018-06-20 ENCOUNTER — HOSPITAL ENCOUNTER (EMERGENCY)
Facility: CLINIC | Age: 38
Discharge: HOME | End: 2018-06-20
Attending: EMERGENCY MEDICINE | Admitting: EMERGENCY MEDICINE
Payer: COMMERCIAL

## 2018-06-20 VITALS
DIASTOLIC BLOOD PRESSURE: 75 MMHG | SYSTOLIC BLOOD PRESSURE: 137 MMHG | HEART RATE: 126 BPM | TEMPERATURE: 100.1 F | OXYGEN SATURATION: 95 % | HEIGHT: 66 IN | RESPIRATION RATE: 16 BRPM

## 2018-06-20 DIAGNOSIS — R07.0 THROAT PAIN: ICD-10-CM

## 2018-06-20 LAB
ALBUMIN SERPL-MCNC: 4.2 G/DL (ref 3.4–5)
ALP SERPL-CCNC: 70 U/L (ref 40–150)
ALT SERPL W P-5'-P-CCNC: 25 U/L (ref 0–50)
ANION GAP SERPL CALCULATED.3IONS-SCNC: 9 MMOL/L (ref 3–14)
AST SERPL W P-5'-P-CCNC: 17 U/L (ref 0–45)
BASOPHILS # BLD AUTO: 0.1 10E9/L (ref 0–0.2)
BASOPHILS NFR BLD AUTO: 0.5 %
BILIRUB SERPL-MCNC: 0.3 MG/DL (ref 0.2–1.3)
BUN SERPL-MCNC: 14 MG/DL (ref 7–30)
CALCIUM SERPL-MCNC: 8.8 MG/DL (ref 8.5–10.1)
CHLORIDE SERPL-SCNC: 101 MMOL/L (ref 94–109)
CO2 SERPL-SCNC: 25 MMOL/L (ref 20–32)
CREAT SERPL-MCNC: 0.74 MG/DL (ref 0.52–1.04)
DEPRECATED S PYO AG THROAT QL EIA: NORMAL
DIFFERENTIAL METHOD BLD: ABNORMAL
EOSINOPHIL # BLD AUTO: 0 10E9/L (ref 0–0.7)
EOSINOPHIL NFR BLD AUTO: 0.2 %
ERYTHROCYTE [DISTWIDTH] IN BLOOD BY AUTOMATED COUNT: 12.2 % (ref 10–15)
GFR SERPL CREATININE-BSD FRML MDRD: 87 ML/MIN/1.7M2
GLUCOSE SERPL-MCNC: 125 MG/DL (ref 70–99)
HCT VFR BLD AUTO: 35.5 % (ref 35–47)
HGB BLD-MCNC: 12.1 G/DL (ref 11.7–15.7)
IMM GRANULOCYTES # BLD: 0.1 10E9/L (ref 0–0.4)
IMM GRANULOCYTES NFR BLD: 0.5 %
LACTATE BLD-SCNC: 0.9 MMOL/L (ref 0.7–2)
LYMPHOCYTES # BLD AUTO: 0.6 10E9/L (ref 0.8–5.3)
LYMPHOCYTES NFR BLD AUTO: 4.1 %
MCH RBC QN AUTO: 28.9 PG (ref 26.5–33)
MCHC RBC AUTO-ENTMCNC: 34.1 G/DL (ref 31.5–36.5)
MCV RBC AUTO: 85 FL (ref 78–100)
MONOCYTES # BLD AUTO: 0.9 10E9/L (ref 0–1.3)
MONOCYTES NFR BLD AUTO: 5.8 %
NEUTROPHILS # BLD AUTO: 13.2 10E9/L (ref 1.6–8.3)
NEUTROPHILS NFR BLD AUTO: 88.9 %
NRBC # BLD AUTO: 0 10*3/UL
NRBC BLD AUTO-RTO: 0 /100
PLATELET # BLD AUTO: 247 10E9/L (ref 150–450)
POTASSIUM SERPL-SCNC: 3.7 MMOL/L (ref 3.4–5.3)
PROT SERPL-MCNC: 8.1 G/DL (ref 6.8–8.8)
RBC # BLD AUTO: 4.18 10E12/L (ref 3.8–5.2)
SODIUM SERPL-SCNC: 135 MMOL/L (ref 133–144)
SPECIMEN SOURCE: NORMAL
WBC # BLD AUTO: 14.9 10E9/L (ref 4–11)

## 2018-06-20 PROCEDURE — 96361 HYDRATE IV INFUSION ADD-ON: CPT | Performed by: EMERGENCY MEDICINE

## 2018-06-20 PROCEDURE — 85025 COMPLETE CBC W/AUTO DIFF WBC: CPT | Performed by: EMERGENCY MEDICINE

## 2018-06-20 PROCEDURE — 83605 ASSAY OF LACTIC ACID: CPT | Performed by: EMERGENCY MEDICINE

## 2018-06-20 PROCEDURE — 99284 EMERGENCY DEPT VISIT MOD MDM: CPT | Mod: Z6 | Performed by: EMERGENCY MEDICINE

## 2018-06-20 PROCEDURE — 25000132 ZZH RX MED GY IP 250 OP 250 PS 637: Performed by: EMERGENCY MEDICINE

## 2018-06-20 PROCEDURE — 87081 CULTURE SCREEN ONLY: CPT | Performed by: EMERGENCY MEDICINE

## 2018-06-20 PROCEDURE — 96374 THER/PROPH/DIAG INJ IV PUSH: CPT | Performed by: EMERGENCY MEDICINE

## 2018-06-20 PROCEDURE — 99285 EMERGENCY DEPT VISIT HI MDM: CPT | Mod: 25 | Performed by: EMERGENCY MEDICINE

## 2018-06-20 PROCEDURE — 80053 COMPREHEN METABOLIC PANEL: CPT | Performed by: EMERGENCY MEDICINE

## 2018-06-20 PROCEDURE — 96375 TX/PRO/DX INJ NEW DRUG ADDON: CPT | Performed by: EMERGENCY MEDICINE

## 2018-06-20 PROCEDURE — 87880 STREP A ASSAY W/OPTIC: CPT | Performed by: EMERGENCY MEDICINE

## 2018-06-20 PROCEDURE — 25000128 H RX IP 250 OP 636: Performed by: EMERGENCY MEDICINE

## 2018-06-20 RX ORDER — SODIUM CHLORIDE 9 MG/ML
1000 INJECTION, SOLUTION INTRAVENOUS CONTINUOUS
Status: DISCONTINUED | OUTPATIENT
Start: 2018-06-20 | End: 2018-06-21 | Stop reason: HOSPADM

## 2018-06-20 RX ORDER — HYDROMORPHONE HYDROCHLORIDE 1 MG/ML
0.5 INJECTION, SOLUTION INTRAMUSCULAR; INTRAVENOUS; SUBCUTANEOUS
Status: COMPLETED | OUTPATIENT
Start: 2018-06-20 | End: 2018-06-20

## 2018-06-20 RX ORDER — DEXAMETHASONE SODIUM PHOSPHATE 10 MG/ML
10 INJECTION, SOLUTION INTRAMUSCULAR; INTRAVENOUS ONCE
Status: COMPLETED | OUTPATIENT
Start: 2018-06-20 | End: 2018-06-20

## 2018-06-20 RX ADMIN — HYDROMORPHONE HYDROCHLORIDE 0.5 MG: 1 INJECTION, SOLUTION INTRAMUSCULAR; INTRAVENOUS; SUBCUTANEOUS at 22:54

## 2018-06-20 RX ADMIN — SODIUM CHLORIDE 1000 ML: 9 INJECTION, SOLUTION INTRAVENOUS at 22:53

## 2018-06-20 RX ADMIN — IBUPROFEN 600 MG: 400 TABLET ORAL at 21:10

## 2018-06-20 RX ADMIN — DEXAMETHASONE SODIUM PHOSPHATE 10 MG: 10 INJECTION, SOLUTION INTRAMUSCULAR; INTRAVENOUS at 22:54

## 2018-06-20 NOTE — ED AVS SNAPSHOT
[facility] Emergency Department    [address]    [facility] [address]    Phone:  [phone]    Fax:  [phone]                                       Sharlene Natarajan   MRN: [id number]    Department:  [facility] Emergency Department   Date of Visit:  6/20/2018           Patient Information     Date Of Birth          1980        Your diagnoses for this visit were:     Throat pain        You were seen by Thomas Slaughter MD.      Follow-up Information     Follow up with Jackie Olivo MD.    Specialty:  Family Practice    Why:  As needed    Contact information:    [address] [facility] [id number] [phone]        Discharge References/Attachments     SORE THROATS, SELF-CARE FOR (ENGLISH)      24 Hour Appointment Hotline       To make an appointment at any [facility], call [phone] ([phone]). If you don't have a family doctor or clinic, we will help you find one. [facility] are conveniently located to serve the needs of you and your family.             Review of your medicines      Our records show that you are taking the medicines listed below. If these are incorrect, please call your family doctor or clinic.        Dose / Directions Last dose taken    DULoxetine 30 MG EC capsule   Commonly known as:  CYMBALTA   Dose:  90 mg   Quantity:  270 capsule        Take 3 capsules (90 mg) by mouth daily   Refills:  3        hydrOXYzine 25 MG tablet   Commonly known as:  ATARAX   Dose:  25 mg   Quantity:  60 tablet        Take 1 tablet (25 mg) by mouth every 6 hours as needed for anxiety   Refills:  1        ibuprofen 400 MG tablet   Commonly known as:  ADVIL/MOTRIN   Dose:  400-800 mg   Quantity:  60 tablet        Take 1-2 tablets (400-800 mg) by mouth every 6 hours as needed (cramping.  Begin after ketorolac doses completed.  Max of 3200mg/day)   Refills:  1        Prenat Vit-FePoly-Methylfol-FA 29-1.13-0.4 MG Tabs        Take  by mouth.   Refills:  0         PROBIOTIC + OMEGA-3 PO        One day   Refills:  0                Procedures and tests performed during your visit     Beta strep group A culture    CBC with platelets differential    Comprehensive metabolic panel    Lactic acid whole blood    Rapid strep screen      Orders Needing Specimen Collection     None      Pending Results     Date and Time Order Name Status Description    6/20/2018 2110 Beta strep group A culture In process             Pending Culture Results     Date and Time Order Name Status Description    6/20/2018 2110 Beta strep group A culture In process             Pending Results Instructions     If you had any lab results that were not finalized at the time of your Discharge, you can call the ED Lab Result RN at [phone]. You will be contacted by this team for any positive Lab results or changes in treatment. The nurses are available 7 days a week from 10A to 6:30P.  You can leave a message 24 hours per day and they will return your call.        Test Results From Your Hospital Stay        6/20/2018  9:49 PM      Component Results     Component    Specimen Description    Throat    Rapid Strep A Screen    NEGATIVE: No Group A streptococcal antigen detected by immunoassay, await culture report.         6/20/2018  9:56 PM         6/20/2018 10:58 PM      Component Results     Component Value Ref Range & Units Status    WBC 14.9 (H) 4.0 - 11.0 10e9/L Final    RBC Count 4.18 3.8 - 5.2 10e12/L Final    Hemoglobin 12.1 11.7 - 15.7 g/dL Final    Hematocrit 35.5 35.0 - 47.0 % Final    MCV 85 78 - 100 fl Final    MCH 28.9 26.5 - 33.0 pg Final    MCHC 34.1 31.5 - 36.5 g/dL Final    RDW 12.2 10.0 - 15.0 % Final    Platelet Count 247 150 - 450 10e9/L Final    Diff Method Automated Method  Final    % Neutrophils 88.9 % Final    % Lymphocytes 4.1 % Final    % Monocytes 5.8 % Final    % Eosinophils 0.2 % Final    % Basophils 0.5 % Final    % Immature Granulocytes 0.5 % Final    Nucleated RBCs 0 0 /100 Final     Absolute Neutrophil 13.2 (H) 1.6 - 8.3 10e9/L Final    Absolute Lymphocytes 0.6 (L) 0.8 - 5.3 10e9/L Final    Absolute Monocytes 0.9 0.0 - 1.3 10e9/L Final    Absolute Eosinophils 0.0 0.0 - 0.7 10e9/L Final    Absolute Basophils 0.1 0.0 - 0.2 10e9/L Final    Abs Immature Granulocytes 0.1 0 - 0.4 10e9/L Final    Absolute Nucleated RBC 0.0  Final         6/20/2018 11:15 PM      Component Results     Component Value Ref Range & Units Status    Sodium 135 133 - 144 mmol/L Final    Potassium 3.7 3.4 - 5.3 mmol/L Final    Chloride 101 94 - 109 mmol/L Final    Carbon Dioxide 25 20 - 32 mmol/L Final    Anion Gap 9 3 - 14 mmol/L Final    Glucose 125 (H) 70 - 99 mg/dL Final    Urea Nitrogen 14 7 - 30 mg/dL Final    Creatinine 0.74 0.52 - 1.04 mg/dL Final    GFR Estimate 87 >60 mL/min/1.7m2 Final    Non  GFR Calc    GFR Estimate If Black >90 >60 mL/min/1.7m2 Final    African American GFR Calc    Calcium 8.8 8.5 - 10.1 mg/dL Final    Bilirubin Total 0.3 0.2 - 1.3 mg/dL Final    Albumin 4.2 3.4 - 5.0 g/dL Final    Protein Total 8.1 6.8 - 8.8 g/dL Final    Alkaline Phosphatase 70 40 - 150 U/L Final    ALT 25 0 - 50 U/L Final    AST 17 0 - 45 U/L Final         6/20/2018 10:58 PM      Component Results     Component Value Ref Range & Units Status    Lactic Acid 0.9 0.7 - 2.0 mmol/L Final                Thank you for choosing [facility]       Thank you for choosing [facility] for your care. Our goal is always to provide you with excellent care. Hearing back from our patients is one way we can continue to improve our services. Please take a few minutes to complete the written survey that you may receive in the mail after you visit with us. Thank you!        V[employer]cherlinkhart Information     [employer] gives you secure access to your electronic health record. If you see a primary care provider, you can also send messages to your care team and make appointments. If you have questions, please call your primary care clinic.  If you do not  have a primary care provider, please call [phone] and they will assist you.        Care EveryWhere ID     This is your Care EveryWhere ID. This could be used by other organizations to access your [facility] medical records  [id number]        Equal Access to Services     ISIDRO GAMBLE : Heike Randolph, atilio krause, cynthia chamorro. So [facility] [phone].    ATENCIÓN: Si habla español, tiene a claros disposición servicios gratuitos de asistencia lingüística. Llame al [phone].    We comply with applicable federal civil rights laws and [place] laws. We do not discriminate on the basis of race, color, national origin, age, disability, sex, sexual orientation, or gender identity.            After Visit Summary       This is your record. Keep this with you and show to your community pharmacist(s) and doctor(s) at your next visit.

## 2018-06-20 NOTE — ED AVS SNAPSHOT
[facility] Emergency Department    [address] [address] [address]    Phone:  [phone]    Fax:  [phone]                                       Sharlene Natarajan   MRN: [id number]    Department:  [facility] Emergency Department   Date of Visit:  6/20/2018           After Visit Summary Signature Page     I have received my discharge instructions, and my questions have been answered. I have discussed any challenges I see with this plan with the nurse or doctor.    ..........................................................................................................................................  Patient/Patient Representative Signature      ..........................................................................................................................................  Patient Representative Print Name and Relationship to Patient    ..................................................               ................................................  Date                                            Time    ..........................................................................................................................................  Reviewed by Signature/Title    ...................................................              ..............................................  Date                                                            Time

## 2018-06-21 NOTE — ED NOTES
Patient with sore throat and headache around noon today, states it was sudden onset along with chills.  in room with wife, MD at bedside.

## 2018-06-23 LAB
BACTERIA SPEC CULT: NORMAL
Lab: NORMAL
SPECIMEN SOURCE: NORMAL

## 2018-07-24 PROBLEM — F41.8 DEPRESSION WITH ANXIETY: Status: ACTIVE | Noted: 2018-07-24

## 2018-10-01 PROBLEM — I10 ESSENTIAL HYPERTENSION, BENIGN: Status: ACTIVE | Noted: 2018-10-01

## 2019-03-09 ENCOUNTER — HEALTH MAINTENANCE LETTER (OUTPATIENT)
Age: 39
End: 2019-03-09

## 2019-03-22 ENCOUNTER — OFFICE VISIT (OUTPATIENT)
Dept: OBGYN | Facility: CLINIC | Age: 39
End: 2019-03-22
Payer: COMMERCIAL

## 2019-03-22 VITALS
SYSTOLIC BLOOD PRESSURE: 135 MMHG | DIASTOLIC BLOOD PRESSURE: 90 MMHG | TEMPERATURE: 97.6 F | WEIGHT: 179 LBS | RESPIRATION RATE: 16 BRPM | HEART RATE: 83 BPM | BODY MASS INDEX: 28.77 KG/M2 | HEIGHT: 66 IN

## 2019-03-22 DIAGNOSIS — D06.9 CIN III WITH SEVERE DYSPLASIA: ICD-10-CM

## 2019-03-22 DIAGNOSIS — F43.23 ADJUSTMENT DISORDER WITH MIXED ANXIETY AND DEPRESSED MOOD: ICD-10-CM

## 2019-03-22 DIAGNOSIS — Z01.419 ENCOUNTER FOR GYNECOLOGICAL EXAMINATION WITHOUT ABNORMAL FINDING: Primary | ICD-10-CM

## 2019-03-22 PROCEDURE — 88175 CYTOPATH C/V AUTO FLUID REDO: CPT | Performed by: OBSTETRICS & GYNECOLOGY

## 2019-03-22 PROCEDURE — 87624 HPV HI-RISK TYP POOLED RSLT: CPT | Performed by: OBSTETRICS & GYNECOLOGY

## 2019-03-22 PROCEDURE — 99395 PREV VISIT EST AGE 18-39: CPT | Performed by: OBSTETRICS & GYNECOLOGY

## 2019-03-22 RX ORDER — PAROXETINE 20 MG/1
20 TABLET, FILM COATED ORAL EVERY MORNING
Qty: 90 TABLET | Refills: 3 | Status: SHIPPED | OUTPATIENT
Start: 2019-03-22 | End: 2019-04-12

## 2019-03-22 ASSESSMENT — ANXIETY QUESTIONNAIRES
3. WORRYING TOO MUCH ABOUT DIFFERENT THINGS: MORE THAN HALF THE DAYS
GAD7 TOTAL SCORE: 18
2. NOT BEING ABLE TO STOP OR CONTROL WORRYING: NEARLY EVERY DAY
IF YOU CHECKED OFF ANY PROBLEMS ON THIS QUESTIONNAIRE, HOW DIFFICULT HAVE THESE PROBLEMS MADE IT FOR YOU TO DO YOUR WORK, TAKE CARE OF THINGS AT HOME, OR GET ALONG WITH OTHER PEOPLE: VERY DIFFICULT
1. FEELING NERVOUS, ANXIOUS, OR ON EDGE: NEARLY EVERY DAY
7. FEELING AFRAID AS IF SOMETHING AWFUL MIGHT HAPPEN: MORE THAN HALF THE DAYS
5. BEING SO RESTLESS THAT IT IS HARD TO SIT STILL: NEARLY EVERY DAY
6. BECOMING EASILY ANNOYED OR IRRITABLE: NEARLY EVERY DAY

## 2019-03-22 ASSESSMENT — PATIENT HEALTH QUESTIONNAIRE - PHQ9
5. POOR APPETITE OR OVEREATING: MORE THAN HALF THE DAYS
SUM OF ALL RESPONSES TO PHQ QUESTIONS 1-9: 15

## 2019-03-22 ASSESSMENT — MIFFLIN-ST. JEOR: SCORE: 1503.69

## 2019-03-22 NOTE — PROGRESS NOTES
SUBJECTIVE:   CC: Sharlene Natarajan is an 39 year old woman who presents for preventive health visit.   She stopped Cymbalta prescribed for depression due to sedation and lack of efficacy; would like to try something else.    Healthy Habits:    Do you get at least three servings of calcium containing foods daily (dairy, green leafy vegetables, etc.)? yes    Amount of exercise or daily activities, outside of work: 1-2 day(s) per week    Problems taking medications regularly No    Medication side effects: No    Have you had an eye exam in the past two years? yes    Do you see a dentist twice per year? no    Do you have sleep apnea, excessive snoring or daytime drowsiness?yes          Today's PHQ-2 Score:   PHQ-2 (  [employer]) 3/22/2019 2017   Q1: Little interest or pleasure in doing things 2 1   Q2: Feeling down, depressed or hopeless 2 1   PHQ-2 Score 4 2       Abuse: Current or Past(Physical, Sexual or Emotional)- No  Do you feel safe in your environment? Yes    Social History     Tobacco Use     Smoking status: Never Smoker     Smokeless tobacco: Never Used   Substance Use Topics     Alcohol use: No     Alcohol/week: 0.0 oz     If you drink alcohol do you typically have >3 drinks per day or >7 drinks per week? No                     Reviewed orders with patient.  Reviewed health maintenance and updated orders accordingly - Yes  Labs reviewed in EPIC  BP Readings from Last 3 Encounters:   19 135/90   18 137/75   18 142/68    Wt Readings from Last 3 Encounters:   19 81.2 kg (179 lb)   18 74.2 kg (163 lb 9.6 oz)   18 72.8 kg (160 lb 6.4 oz)                  Patient Active Problem List   Diagnosis     Female infertility     CARDIOVASCULAR SCREENING; LDL GOAL LESS THAN 130     Depression     WINDY III with severe dysplasia     Depression with anxiety     Essential hypertension, benign     Past Surgical History:   Procedure Laterality Date      SECTION  2011     Procedure: SECTION;  Section; Surgeon:ABELINO CABAN; Location:[facility] OR      SECTION  2014    Procedure:  SECTION;   Section;  Surgeon: Abelino Caban MD;  Location: [facility] OR       Social History     Tobacco Use     Smoking status: Never Smoker     Smokeless tobacco: Never Used   Substance Use Topics     Alcohol use: No     Alcohol/week: 0.0 oz     Family History   Problem Relation Age of Onset     Cancer Mother         cervical     Heart Disease Mother         MVP     Thyroid Disease Mother      Gastrointestinal Disease Mother         IBS     Diabetes Mother      Hypertension Mother      Myocardial Infarction Mother      Psychotic Disorder Father         bipolar     Depression Father      Diabetes Maternal Grandfather      Prostate Cancer Paternal Grandfather      Heart Disease Paternal Grandfather         pacemaker     Allergies Sister      Depression Sister      Osteoporosis Paternal Grandmother            Mammogram not appropriate for this patient based on age.    Pertinent mammograms are reviewed under the imaging tab.  History of abnormal Pap smear:   YES - other categories - see link Cervical Cytology Screening Guidelines  Last 3 Pap and HPV Results:   PAP / HPV Latest Ref Rng & Units 2017 3/19/2014 2011   PAP - NIL NIL NIL   HPV 16 DNA NEG:Negative Positive(A) - -   HPV 18 DNA NEG:Negative Negative - -   OTHER HR HPV NEG:Negative Negative - -     PAP / HPV Latest Ref Rng & Units 2017 3/19/2014 2011   PAP - NIL NIL NIL   HPV 16 DNA NEG:Negative Positive(A) - -   HPV 18 DNA NEG:Negative Negative - -   OTHER HR HPV NEG:Negative Negative - -     Reviewed and updated as needed this visit by clinical staff  Tobacco  Allergies  Meds  Med Hx  Surg Hx  Fam Hx  Soc Hx        Reviewed and updated as needed this visit by Provider            ROS:  CONSTITUTIONAL:positive for anxiety; moodiness  INTEGUMENTARU/SKIN: NEGATIVE for worrisome  "rashes, moles or lesions  EYES: NEGATIVE for vision changes or irritation  ENT: NEGATIVE for ear, mouth and throat problems  RESP: NEGATIVE for significant cough or SOB  BREAST: NEGATIVE for masses, tenderness or discharge  CV: NEGATIVE for chest pain, palpitations or peripheral edema  GI: NEGATIVE for nausea, abdominal pain, heartburn, or change in bowel habits  : NEGATIVE for unusual urinary or vaginal symptoms. Periods are regular.  MUSCULOSKELETAL: NEGATIVE for significant arthralgias or myalgia  NEURO: NEGATIVE for weakness, dizziness or paresthesias  PSYCHIATRIC: NEGATIVE for changes in mood or affect    OBJECTIVE:   /90 (BP Location: Right arm, Patient Position: Chair, Cuff Size: Adult Small)   Pulse 83   Temp 97.6  F (36.4  C) (Tympanic)   Resp 16   Ht 1.676 m (5' 6\")   Wt 81.2 kg (179 lb)   LMP 03/01/2019   BMI 28.89 kg/m    EXAM:  GENERAL: healthy, alert and no distress  EYES: Eyes grossly normal to inspection, PERRL and conjunctivae and sclerae normal  HENT: ear canals and TM's normal, nose and mouth without ulcers or lesions  NECK: no adenopathy, no asymmetry, masses, or scars and thyroid normal to palpation  RESP: lungs clear to auscultation - no rales, rhonchi or wheezes  BREAST: normal without masses, tenderness or nipple discharge and no palpable axillary masses or adenopathy  CV: regular rate and rhythm, normal S1 S2, no S3 or S4, no murmur, click or rub, no peripheral edema and peripheral pulses strong  ABDOMEN: soft, nontender, no hepatosplenomegaly, no masses and bowel sounds normal   (female): normal female external genitalia, normal urethral meatus, vaginal mucosa pink, moist, well rugated, and normal cervix/adnexa/uterus without masses or discharge  MS: no gross musculoskeletal defects noted, no edema  SKIN: no suspicious lesions or rashes  NEURO: Normal strength and tone, mentation intact and speech normal  PSYCH: mentation appears normal, affect normal/bright    Diagnostic " "Test Results:  none     ASSESSMENT/PLAN:       ICD-10-CM    1. Encounter for gynecological examination without abnormal finding Z01.419    2. WINDY III with severe dysplasia D06.9 Pap imaged thin layer diagnostic with HPV (select HPV order below)     HPV High Risk Types DNA Cervical   3. Adjustment disorder with mixed anxiety and depressed mood F43.23 PARoxetine (PAXIL) 20 MG tablet       COUNSELING:   Reviewed preventive health counseling, as reflected in patient instructions  Special attention given to:        self care, use of SSRI therapy; potential future titration of meds; start Paxil 20mg daily       Regular exercise       Healthy diet/nutrition       Contraception       Family planning    BP Readings from Last 1 Encounters:   03/22/19 135/90     Estimated body mass index is 28.89 kg/m  as calculated from the following:    Height as of this encounter: 1.676 m (5' 6\").    Weight as of this encounter: 81.2 kg (179 lb).    BP Screening:   Last 3 BP Readings:    BP Readings from Last 3 Encounters:   03/22/19 135/90   06/20/18 137/75   05/29/18 142/68       The following was recommended to the patient:  Re-screen BP within a year and recommended lifestyle modifications       reports that  has never smoked. she has never used smokeless tobacco.      Counseling Resources:  ATP IV Guidelines  Pooled Cohorts Equation Calculator  Breast Cancer Risk Calculator  FRAX Risk Assessment  ICSI Preventive Guidelines  Dietary Guidelines for Americans, 2010  USDA's MyPlate  ASA Prophylaxis  Lung CA Screening    nAna Saldaña MD  [facility]  "

## 2019-03-23 ASSESSMENT — ANXIETY QUESTIONNAIRES: GAD7 TOTAL SCORE: 18

## 2019-03-26 LAB
COPATH REPORT: NORMAL
PAP: NORMAL

## 2019-03-27 LAB
FINAL DIAGNOSIS: NORMAL
HPV HR 12 DNA CVX QL NAA+PROBE: NEGATIVE
HPV16 DNA SPEC QL NAA+PROBE: NEGATIVE
HPV18 DNA SPEC QL NAA+PROBE: NEGATIVE
SPECIMEN DESCRIPTION: NORMAL
SPECIMEN SOURCE CVX/VAG CYTO: NORMAL

## 2019-03-29 NOTE — ED PROVIDER NOTES
History     Chief Complaint   Patient presents with     Pharyngitis     Headache     HPI  Sharlene Natarajan is a 38 year old female who presents with sudden onset of headache, sore throat, fever, paresthesias in her face and hands.  Nasal congestion but no facial tenderness.  Able to speak in complete sentences and swallow secretions.  Denies chest pain or shortness of breath.  No significant cough.  Denies abdominal pain, nausea or vomiting.  She has had no diarrhea or dysuria.  Patient has fertility issues and does not think she is pregnant.  She is on Cymbalta and unfortunately missed last evening's dosing and wonder if that is what is causing her paresthesias.  Patient had 2 previous C-sections.  She has had recurrent episodes of strep pharyngitis.  Not had a tonsillectomy. No known exposure to strep but her daughters do go to .  No treatment for symptoms prior to arrival.    Problem List:    Patient Active Problem List    Diagnosis Date Noted     Depression 11/29/2017     Priority: Medium     Started on Cymbalta 60mg--side effects; lowered after 1 week to 30mg QD       WINDY III with severe dysplasia 11/29/2017     Priority: Medium     11/29/17 NIL pap, + HR HPV 16. Plan colp due by 2/29/18 12/14/17 [place] Bx - WINDY 2 & 3, ECC - Negative. Plan LEEP  2/2/18: LEEP: WINDY 3; margins clear--cotesting 1 year         CARDIOVASCULAR SCREENING; LDL GOAL LESS THAN 130 12/10/2012     Priority: Medium     Female infertility 05/12/2010     Priority: Medium     Cycle #1: Clomid 50mg day 3-7--Prog 10.8  Cycle #2: Clomid 50mg day 3-7; HCG trigger, IUI, Prometrium starting cycle day 22; day 21 prog=2.3  Cycle #3: Clomid 100mg day 3-7, day 10 sono showed borderline hyperstimulation; no trigger or IUI done; consider change to Letrazole next cycle or referral to [facility]DA; day 21 prog low  Cycle 4: Letrazole 2.5mg day 3-7  Referred to [facility]--4 cycles with IUI; with only 1 chemical pregnancy          Past Medical History:    Past  Please advise   Pt would like to speak with provider in regards to a new skin tear on left leg     "Medical History:   Diagnosis Date     Anxiety      Cervical high risk HPV (human papillomavirus) test positive 2017     Chickenpox      Chlamydia 2000     Depression      Infertility      Mild preeclampsia        Past Surgical History:    Past Surgical History:   Procedure Laterality Date      SECTION  2011    Procedure: SECTION;  Section; Surgeon:ABELINO CABAN; Location:[facility]      SECTION  2014    Procedure:  SECTION;   Section;  Surgeon: Abelino Caban MD;  Location: [facility]       Family History:    Family History   Problem Relation Age of Onset     Cancer Mother      cervical     HEART DISEASE Mother      MVP     Thyroid Disease Mother      GASTROINTESTINAL DISEASE Mother      IBS     Diabetes Mother      Hypertension Mother      Myocardial Infarction Mother      Psychotic Disorder Father      bipolar     Depression Father      Diabetes Maternal Grandfather      Prostate Cancer Paternal Grandfather      HEART DISEASE Paternal Grandfather      pacemaker     Allergies Sister      Depression Sister      Osteoperosis Paternal Grandmother        Social History:  Marital Status:   [2]  Social History   Substance Use Topics     Smoking status: Never Smoker     Smokeless tobacco: Never Used     Alcohol use No        Medications:      DULoxetine (CYMBALTA) 30 MG EC capsule   hydrOXYzine (ATARAX) 25 MG tablet   ibuprofen (ADVIL,MOTRIN) 400 MG tablet   Prenat Vit-FePoly-Methylfol-FA 29-1.13-0.4 MG TABS   Probiotic Product (PROBIOTIC + OMEGA-3 PO)         Review of Systems all other systems reviewed and are negative.    Physical Exam   BP: 132/80  Pulse: 126  Temp: 100.3  F (37.9  C)  Resp: 16  Height: 167.6 cm (5' 6\")  SpO2: 98 %      Physical Exam alert cooperative female in moderate distress.  HEENT shows no facial asymmetry.  Ears are clear bilaterally.  Eyes show no injection.  Nasal passages are boggy but patent.  Orally she has " tonsillar hypertrophy with erythema but no exudate.  Neck is supple without meningismus.  She has tender anterior nodes.  Lungs were clear without adventitious sounds.  Cardiac is tachycardic in the 120s without murmur.  Abdomen is soft and benign.  She has no CVA tenderness.  No skin rashes are appreciated.    ED Course     ED Course     Procedures               Critical Care time:  none               Results for orders placed or performed during the hospital encounter of 06/20/18 (from the past 24 hour(s))   Rapid strep screen   Result Value Ref Range    Specimen Description Throat     Rapid Strep A Screen       NEGATIVE: No Group A streptococcal antigen detected by immunoassay, await culture report.   CBC with platelets differential   Result Value Ref Range    WBC 14.9 (H) 4.0 - 11.0 10e9/L    RBC Count 4.18 3.8 - 5.2 10e12/L    Hemoglobin 12.1 11.7 - 15.7 g/dL    Hematocrit 35.5 35.0 - 47.0 %    MCV 85 78 - 100 fl    MCH 28.9 26.5 - 33.0 pg    MCHC 34.1 31.5 - 36.5 g/dL    RDW 12.2 10.0 - 15.0 %    Platelet Count 247 150 - 450 10e9/L    Diff Method Automated Method     % Neutrophils 88.9 %    % Lymphocytes 4.1 %    % Monocytes 5.8 %    % Eosinophils 0.2 %    % Basophils 0.5 %    % Immature Granulocytes 0.5 %    Nucleated RBCs 0 0 /100    Absolute Neutrophil 13.2 (H) 1.6 - 8.3 10e9/L    Absolute Lymphocytes 0.6 (L) 0.8 - 5.3 10e9/L    Absolute Monocytes 0.9 0.0 - 1.3 10e9/L    Absolute Eosinophils 0.0 0.0 - 0.7 10e9/L    Absolute Basophils 0.1 0.0 - 0.2 10e9/L    Abs Immature Granulocytes 0.1 0 - 0.4 10e9/L    Absolute Nucleated RBC 0.0    Comprehensive metabolic panel   Result Value Ref Range    Sodium 135 133 - 144 mmol/L    Potassium 3.7 3.4 - 5.3 mmol/L    Chloride 101 94 - 109 mmol/L    Carbon Dioxide 25 20 - 32 mmol/L    Anion Gap 9 3 - 14 mmol/L    Glucose 125 (H) 70 - 99 mg/dL    Urea Nitrogen 14 7 - 30 mg/dL    Creatinine 0.74 0.52 - 1.04 mg/dL    GFR Estimate 87 >60 mL/min/1.7m2    GFR Estimate If Black  >90 >60 mL/min/1.7m2    Calcium 8.8 8.5 - 10.1 mg/dL    Bilirubin Total 0.3 0.2 - 1.3 mg/dL    Albumin 4.2 3.4 - 5.0 g/dL    Protein Total 8.1 6.8 - 8.8 g/dL    Alkaline Phosphatase 70 40 - 150 U/L    ALT 25 0 - 50 U/L    AST 17 0 - 45 U/L   Lactic acid whole blood   Result Value Ref Range    Lactic Acid 0.9 0.7 - 2.0 mmol/L       Medications   0.9% sodium chloride BOLUS (1,000 mLs Intravenous New Bag 6/20/18 2253)     Followed by   sodium chloride 0.9% infusion (not administered)   ibuprofen (ADVIL/MOTRIN) tablet 600 mg (600 mg Oral Given 6/20/18 2110)   HYDROmorphone (PF) (DILAUDID) injection 0.5 mg (0.5 mg Intravenous Given 6/20/18 2254)   dexamethasone (DECADRON) injection 10 mg (10 mg Intravenous Given 6/20/18 2254)     Rapid strep test was obtained.  She was given ibuprofen  Strep was negative.  Patient did not get significant relief with the ibuprofen.  IV is established and fluid boluses provided.  She is given Dilaudid.  Blood work is obtained.  IV Decadron is provided for swelling  11:20 PM on recheck patient states she feels markedly better.  She has just a mild tension headache currently.  Assessments & Plan (with Medical Decision Making)   Patient is a 38-year-old female presents with sudden onset of headache, sore throat, and fever.  She does have associated nasal congestion.  Denies chest pain, shortness of breath or cough.  She does admit that earlier she had paresthesias in her face and hands.  That has improved.  She is wondering if it is because she missed one dose of Cymbalta.  She has had a history of strep pharyngitis recurrently.  Has not had a tonsillectomy.  No known exposure to strep but her daughters go to .  During the encounter they were informed that 1 of their daughters has similar symptoms.  She has no GI symptoms.  On exam she was mildly tachycardic.  Temperature 100.1.  Not hypoxic.  She had some nasal congestion and tonsillar enlargement with erythema but no exudate.  No  meningismus.  Shotty anterior nodes.  She is able to speak in complete sentences and handle secretions.  Normal cardiac and respiratory auscultation except tachycardia was noted.  Benign abdominal exam.  No skin rashes are appreciated.  Rapid strep is negative.  Patient still felt quite uncomfortable despite given ibuprofen so she was given IV fluids, Dilaudid and Decadron with marked improvement.  Strep culture is pending.  If positive we will contact her.  She is given a handout on pharyngitis and things to watch for.  Reasons to return to emergency room were discussed.  I have reviewed the nursing notes.    I have reviewed the findings, diagnosis, plan and need for follow up with the patient.       New Prescriptions    No medications on file       Final diagnoses:   Throat pain       6/20/2018   [facility] EMERGENCY DEPARTMENT     Thomas Slaughter MD  06/20/18 [id number]

## 2019-04-12 ENCOUNTER — MYC MEDICAL ADVICE (OUTPATIENT)
Dept: OBGYN | Facility: CLINIC | Age: 39
End: 2019-04-12

## 2019-04-12 DIAGNOSIS — F43.23 ADJUSTMENT DISORDER WITH MIXED ANXIETY AND DEPRESSED MOOD: ICD-10-CM

## 2019-04-12 RX ORDER — PAROXETINE 20 MG/1
40 TABLET, FILM COATED ORAL EVERY MORNING
Qty: 180 TABLET | Refills: 3 | Status: SHIPPED | OUTPATIENT
Start: 2019-04-12 | End: 2019-11-06

## 2019-11-06 ENCOUNTER — OFFICE VISIT (OUTPATIENT)
Dept: FAMILY MEDICINE | Facility: CLINIC | Age: 39
End: 2019-11-06
Payer: COMMERCIAL

## 2019-11-06 VITALS
OXYGEN SATURATION: 95 % | SYSTOLIC BLOOD PRESSURE: 134 MMHG | DIASTOLIC BLOOD PRESSURE: 89 MMHG | TEMPERATURE: 99.5 F | BODY MASS INDEX: 28.7 KG/M2 | RESPIRATION RATE: 18 BRPM | WEIGHT: 177.8 LBS | HEART RATE: 102 BPM

## 2019-11-06 DIAGNOSIS — R05.9 COUGH: Primary | ICD-10-CM

## 2019-11-06 LAB
DEPRECATED S PYO AG THROAT QL EIA: NORMAL
FLUAV+FLUBV AG SPEC QL: NEGATIVE
FLUAV+FLUBV AG SPEC QL: NEGATIVE
SPECIMEN SOURCE: NORMAL
SPECIMEN SOURCE: NORMAL

## 2019-11-06 PROCEDURE — 87804 INFLUENZA ASSAY W/OPTIC: CPT | Performed by: FAMILY MEDICINE

## 2019-11-06 PROCEDURE — 99213 OFFICE O/P EST LOW 20 MIN: CPT | Performed by: FAMILY MEDICINE

## 2019-11-06 PROCEDURE — 87081 CULTURE SCREEN ONLY: CPT | Performed by: FAMILY MEDICINE

## 2019-11-06 PROCEDURE — 87880 STREP A ASSAY W/OPTIC: CPT | Performed by: FAMILY MEDICINE

## 2019-11-06 RX ORDER — BENZONATATE 200 MG/1
200 CAPSULE ORAL 3 TIMES DAILY PRN
Qty: 26 CAPSULE | Refills: 0 | Status: SHIPPED | OUTPATIENT
Start: 2019-11-06 | End: 2020-08-14

## 2019-11-06 ASSESSMENT — ANXIETY QUESTIONNAIRES
GAD7 TOTAL SCORE: 12
6. BECOMING EASILY ANNOYED OR IRRITABLE: MORE THAN HALF THE DAYS
2. NOT BEING ABLE TO STOP OR CONTROL WORRYING: SEVERAL DAYS
3. WORRYING TOO MUCH ABOUT DIFFERENT THINGS: MORE THAN HALF THE DAYS
1. FEELING NERVOUS, ANXIOUS, OR ON EDGE: MORE THAN HALF THE DAYS
7. FEELING AFRAID AS IF SOMETHING AWFUL MIGHT HAPPEN: SEVERAL DAYS
5. BEING SO RESTLESS THAT IT IS HARD TO SIT STILL: MORE THAN HALF THE DAYS

## 2019-11-06 ASSESSMENT — PATIENT HEALTH QUESTIONNAIRE - PHQ9
5. POOR APPETITE OR OVEREATING: MORE THAN HALF THE DAYS
SUM OF ALL RESPONSES TO PHQ QUESTIONS 1-9: 5

## 2019-11-06 ASSESSMENT — PAIN SCALES - GENERAL: PAINLEVEL: MODERATE PAIN (5)

## 2019-11-06 NOTE — PROGRESS NOTES
teeASUBJECTIVE:                                                    Sharlene Natarajan is a 39 year old female who presents to clinic today for the following health issues:    Acute Illness   Acute illness concerns: fever, cough, body aches and chills  Onset: a week    Fever: YES    Chills/Sweats: YES    Headache (location?): YES    Sinus Pressure:no    Conjunctivitis:  no    Ear Pain: no    Rhinorrhea: no     Congestion: YES    Sore Throat: no. She says it does get a little sore when she coughs so hard.     Cough: YES-barking    Wheeze: no     Decreased Appetite: no     Nausea: no     Vomiting: YES- from coughing so hard.    Diarrhea:  no     Dysuria/Freq.: no     Fatigue/Achiness: YES    Sick/Strep Exposure: not that she is aware of. She does work at a school     Therapies Tried and outcome: nyquil, delsum, Advil and tylenol.       Problem list and histories reviewed & adjusted, as indicated.  Additional history:     Patient Active Problem List   Diagnosis     Female infertility     CARDIOVASCULAR SCREENING; LDL GOAL LESS THAN 130     Depression     WINDY III with severe dysplasia     Depression with anxiety     Essential hypertension, benign     Past Surgical History:   Procedure Laterality Date      SECTION  2011    Procedure: SECTION;  Section; Surgeon:ABELINO CABAN; Location:[place] [facility]      SECTION  2014    Procedure:  SECTION;   Section;  Surgeon: Abelino Caban MD;  Location: [facility] [facility]       Social History     Tobacco Use     Smoking status: Never Smoker     Smokeless tobacco: Never Used   Substance Use Topics     Alcohol use: No     Alcohol/week: 0.0 standard drinks     Family History   Problem Relation Age of Onset     Cancer Mother         cervical     Heart Disease Mother         MVP     Thyroid Disease Mother      Gastrointestinal Disease Mother         IBS     Diabetes Mother      Hypertension Mother      Myocardial Infarction Mother       Psychotic Disorder Father         bipolar     Depression Father      Diabetes Maternal Grandfather      Prostate Cancer Paternal Grandfather      Heart Disease Paternal Grandfather         pacemaker     Allergies Sister      Depression Sister      Osteoporosis Paternal Grandmother          Current Outpatient Medications   Medication Sig Dispense Refill     ibuprofen (ADVIL,MOTRIN) 400 MG tablet Take 1-2 tablets (400-800 mg) by mouth every 6 hours as needed (cramping.  Begin after ketorolac doses completed.  Max of 3200mg/day) 60 tablet 1     Probiotic Product (PROBIOTIC + OMEGA-3 PO) One day       Allergies   Allergen Reactions     Penicillins Hives       ROS:  Constitutional, HEENT, cardiovascular, pulmonary, gi and gu systems are negative, except as otherwise noted.    OBJECTIVE:                                                    /89   Pulse 102   Temp 99.5  F (37.5  C) (Tympanic)   Resp 18   Wt 80.6 kg (177 lb 12.8 oz)   SpO2 95%   BMI 28.70 kg/m   Body mass index is 28.7 kg/m .   GENERAL: healthy, alert, well nourished, well hydrated, no distress  HENT: ear canals- normal; TMs- normal; Nose- normal; Mouth- no ulcers, no lesions  NECK: no tenderness, no adenopathy, no asymmetry, no masses, no stiffness; thyroid- normal to palpation  RESP: lungs clear to auscultation - no rales, no rhonchi, no wheezes  CV: regular rates and rhythm, normal S1 S2, no S3 or S4 and no murmur, no click or rub -  ABDOMEN: soft, no tenderness, no  hepatosplenomegaly, no masses, normal bowel sounds       ASSESSMENT/PLAN:                                                      (R05) Cough  (primary encounter diagnosis)    Plan: Strep, Rapid Screen, Influenza A/B antigen          Screening is negative.  Flu like illnesss    Bed rest for 2-3 days.       reports that she has never smoked. She has never used smokeless tobacco.    [facility]

## 2019-11-06 NOTE — LETTER
[facility]  [id number] [place] [address]  Phone: [phone]      November 6, 2019      RE: Sharlene Natarajan  [address]  [facility] [address]        To whom it may concern:    Sharlene Natarajan is under my professional care.   Please excuse from work for 2-4 days.    Sincerely,        Dr. Bhupendra Garcia

## 2019-11-07 LAB
BACTERIA SPEC CULT: NORMAL
SPECIMEN SOURCE: NORMAL

## 2019-11-07 ASSESSMENT — ANXIETY QUESTIONNAIRES: GAD7 TOTAL SCORE: 12

## 2019-11-08 ENCOUNTER — TELEPHONE (OUTPATIENT)
Dept: FAMILY MEDICINE | Facility: CLINIC | Age: 39
End: 2019-11-08

## 2019-11-08 ENCOUNTER — NURSE TRIAGE (OUTPATIENT)
Dept: NURSING | Facility: CLINIC | Age: 39
End: 2019-11-08

## 2019-11-08 ENCOUNTER — MYC MEDICAL ADVICE (OUTPATIENT)
Dept: FAMILY MEDICINE | Facility: CLINIC | Age: 39
End: 2019-11-08

## 2019-11-08 DIAGNOSIS — R05.9 COUGH: Primary | ICD-10-CM

## 2019-11-08 RX ORDER — CODEINE PHOSPHATE AND GUAIFENESIN 10; 100 MG/5ML; MG/5ML
1-2 SOLUTION ORAL EVERY 4 HOURS PRN
Qty: 118 ML | Refills: 0 | Status: SHIPPED | OUTPATIENT
Start: 2019-11-08 | End: 2020-08-14

## 2019-11-08 NOTE — TELEPHONE ENCOUNTER
Reason for call:  Patient reporting a symptom    Symptom or request: Sharlene CASTRO MESSAGE:  She was seen on 11/6/19 with Dr. berrios for a cough. Was prescribed tessalon and she is still coughing.  Seems like it actually is worst.  Please call and assess. Thank you..Joslyn Machado    Duration (how long have symptoms been present): since 11/6/19    Have you been treated for this before? Yes 11/6/19    Phone Number patient can be reached at:  Home number on file [phone] (home)      Call taken on 11/8/2019 at 7:54 AM by Joslyn Machado

## 2019-11-08 NOTE — TELEPHONE ENCOUNTER
Call placed to patient.  Voicemail message left requesting call back clinic RN to review symptoms.  Nancy Hernandez RN

## 2019-11-09 ENCOUNTER — APPOINTMENT (OUTPATIENT)
Dept: GENERAL RADIOLOGY | Facility: CLINIC | Age: 39
End: 2019-11-09
Attending: EMERGENCY MEDICINE
Payer: COMMERCIAL

## 2019-11-09 ENCOUNTER — HOSPITAL ENCOUNTER (EMERGENCY)
Facility: CLINIC | Age: 39
Discharge: HOME | End: 2019-11-09
Attending: EMERGENCY MEDICINE | Admitting: EMERGENCY MEDICINE
Payer: COMMERCIAL

## 2019-11-09 VITALS
HEART RATE: 115 BPM | WEIGHT: 173 LBS | RESPIRATION RATE: 20 BRPM | DIASTOLIC BLOOD PRESSURE: 96 MMHG | SYSTOLIC BLOOD PRESSURE: 153 MMHG | TEMPERATURE: 98.6 F | BODY MASS INDEX: 27.8 KG/M2 | HEIGHT: 66 IN | OXYGEN SATURATION: 97 %

## 2019-11-09 DIAGNOSIS — J18.9 PNEUMONIA OF LEFT LOWER LOBE DUE TO INFECTIOUS ORGANISM: ICD-10-CM

## 2019-11-09 DIAGNOSIS — R05.9 COUGH: ICD-10-CM

## 2019-11-09 PROCEDURE — 25000132 ZZH RX MED GY IP 250 OP 250 PS 637: Performed by: EMERGENCY MEDICINE

## 2019-11-09 PROCEDURE — 99283 EMERGENCY DEPT VISIT LOW MDM: CPT | Performed by: EMERGENCY MEDICINE

## 2019-11-09 PROCEDURE — 99284 EMERGENCY DEPT VISIT MOD MDM: CPT | Mod: Z6 | Performed by: EMERGENCY MEDICINE

## 2019-11-09 PROCEDURE — 71046 X-RAY EXAM CHEST 2 VIEWS: CPT

## 2019-11-09 RX ORDER — AZITHROMYCIN 250 MG/1
500 TABLET, FILM COATED ORAL ONCE
Status: COMPLETED | OUTPATIENT
Start: 2019-11-09 | End: 2019-11-09

## 2019-11-09 RX ORDER — AZITHROMYCIN 250 MG/1
TABLET, FILM COATED ORAL
Qty: 6 TABLET | Refills: 0 | Status: SHIPPED | OUTPATIENT
Start: 2019-11-09 | End: 2019-11-14

## 2019-11-09 RX ADMIN — GUAIFENESIN 10 ML: 100 SOLUTION ORAL at 06:41

## 2019-11-09 RX ADMIN — AZITHROMYCIN 500 MG: 250 TABLET, FILM COATED ORAL at 06:40

## 2019-11-09 ASSESSMENT — ENCOUNTER SYMPTOMS
CONSTITUTIONAL NEGATIVE: 1
NEUROLOGICAL NEGATIVE: 1
COUGH: 1
EYES NEGATIVE: 1
SLEEP DISTURBANCE: 1
HEMATOLOGIC/LYMPHATIC NEGATIVE: 1
GASTROINTESTINAL NEGATIVE: 1
MUSCULOSKELETAL NEGATIVE: 1
ENDOCRINE NEGATIVE: 1
PALPITATIONS: 1
ALLERGIC/IMMUNOLOGIC NEGATIVE: 1

## 2019-11-09 ASSESSMENT — MIFFLIN-ST. JEOR: SCORE: 1476.47

## 2019-11-09 NOTE — ED TRIAGE NOTES
Pt presents with cough and nasal congestion for 2 weeks. Worse tonight and has not been able to sleep due to increased coughing, feeling as though her heart was racing, and SOB. Had influenza test/strep test done on Wednesday and was negative. 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

## 2019-11-09 NOTE — ED AVS SNAPSHOT
[facility]  [address] [facility] [address]  Phone:  [phone]  Fax:  [phone]                                    Sharlene Natarajan   MRN: [id number]    Department:  [facility]   Date of Visit:  11/9/2019           After Visit Summary Signature Page    I have received my discharge instructions, and my questions have been answered. I have discussed any challenges I see with this plan with the nurse or doctor.    ..........................................................................................................................................  Patient/Patient Representative Signature      ..........................................................................................................................................  Patient Representative Print Name and Relationship to Patient    ..................................................               ................................................  Date                                   Time    ..........................................................................................................................................  Reviewed by Signature/Title    ...................................................              ..............................................  Date                                               Time          22EPIC Rev 08/18

## 2019-11-09 NOTE — DISCHARGE INSTRUCTIONS
1) Your symptoms appear to be due to early or evolving pneumonia. XR imaging shows an infiltrate in the left lower lung base     2) Based on your reported symptoms and duration of symptoms we have agreed it is best to start you on antibiotics.  I expect your condition to improve in the next few days.  Symptoms may linger for up to 1 to 2 weeks.    3) remember to try over-the-counter remedies such as drinking honey tea, using NyQuil at night you may also use Robitussin for cough suppression if desired

## 2019-11-09 NOTE — ED PROVIDER NOTES
History     Chief Complaint   Patient presents with     Cough     Nasal Congestion     HPI  Sharlene Natarajan is a 39 year old female with a history of female infertility, depression and anxiety and hypertension who presents with 2-week history of nasal congestion and cough.  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  Alone in the department from [facility].  She reports she works as a  [employer].  Has had a cough on and off for the last 2 weeks.  She was seen in clinic 3 days ago.  She was given Tessalon Perles and codeine last night with minimal restless night.  She is here in the department for further assessment and care.  She is yet to receive a flu vaccine.  She is on birth control.  She does not smoke.  Patient presented for further care because she had a restless night, she continues to cough and felt she was feeling palpitations.    Allergies:  Allergies   Allergen Reactions     Penicillins Hives       Problem List:    Patient Active Problem List    Diagnosis Date Noted     Essential hypertension, benign 10/01/2018     Priority: Medium     Depression with anxiety 07/24/2018     Priority: Medium     Depression 11/29/2017     Priority: Medium     Started on Cymbalta 60mg--side effects; lowered after 1 week to 30mg QD       WINDY III with severe dysplasia 11/29/2017     Priority: Medium     11/29/17 NIL pap, + HR HPV 16. Plan colp due by 2/29/18 12/14/17 Ulen Bx - WINDY 2 & 3, ECC - Negative. Plan LEEP  2/2/18: LEEP: WINDY 3; margins clear--cotesting 1 year  3/22/19 Pap: NIL, neg HR HPV. Plan cotest in 1 year.         CARDIOVASCULAR SCREENING; LDL GOAL LESS THAN 130 12/10/2012     Priority: Medium     Female infertility 05/12/2010     Priority: Medium     Cycle #1: Clomid 50mg day 3-7--Prog 10.8  Cycle #2: Clomid 50mg day 3-7; HCG trigger, IUI, Prometrium starting cycle day 22; day 21 prog=2.3  Cycle #3: Clomid  100mg day 3-7, day 10 sono showed borderline hyperstimulation; no trigger or IUI done; consider change to Letrazole next cycle or referral to [facility]; day 21 prog low  Cycle 4: Letrazole 2.5mg day 3-7  Referred to [facility]--4 cycles with IUI; with only 1 chemical pregnancy          Past Medical History:    Past Medical History:   Diagnosis Date     Anxiety      Cervical high risk HPV (human papillomavirus) test positive 2017     Chickenpox      Chlamydia 2000     Depression      Infertility      Mild preeclampsia        Past Surgical History:    Past Surgical History:   Procedure Laterality Date      SECTION  2011    Procedure: SECTION;  Section; Surgeon:ABELINO CABAN; Location:[facility] OR      SECTION  2014    Procedure:  SECTION;   Section;  Surgeon: Abelino Caban MD;  Location: [facility] OR       Family History:    Family History   Problem Relation Age of Onset     Cancer Mother         cervical     Heart Disease Mother         MVP     Thyroid Disease Mother      Gastrointestinal Disease Mother         IBS     Diabetes Mother      Hypertension Mother      Myocardial Infarction Mother      Psychotic Disorder Father         bipolar     Depression Father      Diabetes Maternal Grandfather      Prostate Cancer Paternal Grandfather      Heart Disease Paternal Grandfather         pacemaker     Allergies Sister      Depression Sister      Osteoporosis Paternal Grandmother        Social History:  Marital Status:   [2]  Social History     Tobacco Use     Smoking status: Never Smoker     Smokeless tobacco: Never Used   Substance Use Topics     Alcohol use: No     Alcohol/week: 0.0 standard drinks     Drug use: No        Medications:    azithromycin (ZITHROMAX Z-STACY) 250 MG tablet  benzonatate (TESSALON) 200 MG capsule  guaiFENesin-codeine (ROBITUSSIN AC) 100-10 MG/5ML solution  ibuprofen (ADVIL,MOTRIN) 400 MG tablet  Probiotic Product (PROBIOTIC + OMEGA-3  "PO)          Review of Systems   Constitutional: Negative.    HENT: Positive for congestion.    Eyes: Negative.    Respiratory: Positive for cough.    Cardiovascular: Positive for palpitations.   Gastrointestinal: Negative.    Endocrine: Negative.    Genitourinary: Negative.    Musculoskeletal: Negative.    Skin: Negative.    Allergic/Immunologic: Negative.    Neurological: Negative.    Hematological: Negative.    Psychiatric/Behavioral: Positive for sleep disturbance.       Physical Exam   BP: (!) 153/96  Pulse: 115  Temp: 98.6  F (37  C)  Resp: 20  Height: 167.6 cm (5' 6\")  Weight: 78.5 kg (173 lb)  SpO2: 97 %      Physical Exam  Constitutional:       Appearance: She is not toxic-appearing or diaphoretic.   HENT:      Head: Normocephalic and atraumatic.      Nose: Nose normal. No rhinorrhea.      Mouth/Throat:      Pharynx: No oropharyngeal exudate or posterior oropharyngeal erythema.   Eyes:      General: No scleral icterus.        Right eye: No discharge.         Left eye: No discharge.      Extraocular Movements: Extraocular movements intact.      Conjunctiva/sclera: Conjunctivae normal.      Pupils: Pupils are equal, round, and reactive to light.   Neck:      Musculoskeletal: No neck rigidity or muscular tenderness.      Vascular: No carotid bruit.   Cardiovascular:      Rate and Rhythm: Tachycardia present.      Heart sounds: No murmur. No friction rub. No gallop.    Pulmonary:      Effort: No respiratory distress.      Breath sounds: No stridor. No wheezing, rhonchi or rales.   Chest:      Chest wall: No tenderness.   Abdominal:      General: There is no distension.      Palpations: There is no mass.      Tenderness: There is no tenderness. There is no right CVA tenderness, left CVA tenderness, guarding or rebound.      Hernia: No hernia is present.   Musculoskeletal:         General: No swelling, tenderness, deformity or signs of injury.      Right lower leg: No edema.      Left lower leg: No edema. " "  Lymphadenopathy:      Cervical: No cervical adenopathy.   Skin:     Capillary Refill: Capillary refill takes less than 2 seconds.      Coloration: Skin is not jaundiced or pale.      Findings: No bruising, erythema, lesion or rash.   Neurological:      General: No focal deficit present.      Cranial Nerves: No cranial nerve deficit.      Motor: No weakness.      Coordination: Coordination normal.      Gait: Gait normal.      Deep Tendon Reflexes: Reflexes normal.   Psychiatric:         Mood and Affect: Mood normal.         Behavior: Behavior normal.         Thought Content: Thought content normal.         Judgement: Judgment normal.         ED Course        Procedures               Critical Care time:  none               ED medications:  Medications   guaiFENesin (ROBITUSSIN) 20 mg/mL solution 10 mL (has no administration in time range)   azithromycin (ZITHROMAX) tablet 500 mg (has no administration in time range)         ED Vitals:  Vitals:    11/09/19 0557   BP: (!) 153/96   Pulse: 115   Resp: 20   Temp: 98.6  F (37  C)   TempSrc: Oral   SpO2: 97%   Weight: 78.5 kg (173 lb)   Height: 1.676 m (5' 6\")       ED labs and imaging:  Results for orders placed or performed during the hospital encounter of 11/09/19 (from the past 24 hour(s))   Chest XR,  PA & LAT    Narrative    CHEST 2 VIEWS  11/9/2019 6:27 AM     HISTORY: Two week history of nasal congestion and cough. Palpitations  and tachycardia.    COMPARISON: None.    FINDINGS: A small region of hazy opacities in the left lung base. The  right lung is clear. Normal-sized cardiac silhouette.      Impression    IMPRESSION: A small region of hazy opacities in the left lung base,  most likely representing pneumonia.         Assessments & Plan (with Medical Decision Making)   Clinical impression: 39-year-old female who presented with 2-week history of nasal congestion and cough. Symptoms suspicious for an evolving pneumonia (community acquired. Cannot exclude atypical " pneumonia) based on x-ray imaging showing a opacity in the left lung base. 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  She has 2 children ages 8 and 5.  She is a non-smoker.  On exam she had resting tachycardia heart rate was 115.  She was 97% on room air.  No murmurs rubs or gallops appreciated.  She had intermittent coughing episodes.  Cough is nonproductive.  Patient reported muscle aches and low-grade fever and chills during the course of the week.  Yet to receive her flu vaccine.    ED course and Plan:  I reviewed patient's medical records including her recent visit in the clinic on November 6, 2019 for cough.  During her visit on November 6, 2019 patient had a negative rapid influenza and rapid strep.  X-ray in the department shows an evolving infiltrate in the left lung base of my independent review.  See the radiologist report above. Patient is discharged home with azithromycin.  We discussed supportive care measures and over-the-counter remedies including NyQuil at night Robitussin for cough honey and tea.  Follow-up care for recheck if symptoms not improving next 3 to 5 days.  She expressed understanding and comfort with plan of care.        Disclaimer: This note consists of symbols derived from keyboarding, dictation and/or voice recognition software. As a result, there may be errors in the script that have gone undetected. Please consider this when interpreting information found in this chart.  I have reviewed the nursing notes.    I have reviewed the findings, diagnosis, plan and need for follow up with the patient.       New Prescriptions    AZITHROMYCIN (ZITHROMAX Z-STACY) 250 MG TABLET    Two tablets on the first day, then one tablet daily for the next 4 days       Final diagnoses:   Cough - over the last 2 weeks with congestion, suspect evolving pneumonia   Pneumonia of left lower lobe due to infectious organism (H) - X-ray showing focal infiltrate in the  left lower lung base       11/9/2019   [facility] EMERGENCY DEPARTMENT     Naren Dumas MD  11/09/19 0699

## 2019-11-15 ENCOUNTER — OFFICE VISIT (OUTPATIENT)
Dept: FAMILY MEDICINE | Facility: CLINIC | Age: 39
End: 2019-11-15
Payer: COMMERCIAL

## 2019-11-15 VITALS
RESPIRATION RATE: 18 BRPM | OXYGEN SATURATION: 97 % | BODY MASS INDEX: 28.8 KG/M2 | TEMPERATURE: 98.4 F | HEIGHT: 66 IN | WEIGHT: 179.2 LBS | HEART RATE: 89 BPM | SYSTOLIC BLOOD PRESSURE: 138 MMHG | DIASTOLIC BLOOD PRESSURE: 83 MMHG

## 2019-11-15 DIAGNOSIS — R05.9 COUGH: ICD-10-CM

## 2019-11-15 DIAGNOSIS — J98.01 BRONCHOSPASM: Primary | ICD-10-CM

## 2019-11-15 DIAGNOSIS — J18.9 PNEUMONIA OF LEFT LUNG DUE TO INFECTIOUS ORGANISM, UNSPECIFIED PART OF LUNG: ICD-10-CM

## 2019-11-15 PROCEDURE — 99214 OFFICE O/P EST MOD 30 MIN: CPT | Mod: 25 | Performed by: FAMILY MEDICINE

## 2019-11-15 PROCEDURE — 94640 AIRWAY INHALATION TREATMENT: CPT | Performed by: FAMILY MEDICINE

## 2019-11-15 RX ORDER — ALBUTEROL SULFATE 0.83 MG/ML
2.5 SOLUTION RESPIRATORY (INHALATION) ONCE
Status: COMPLETED | OUTPATIENT
Start: 2019-11-15 | End: 2019-11-15

## 2019-11-15 RX ORDER — PREDNISONE 20 MG/1
40 TABLET ORAL DAILY
Qty: 10 TABLET | Refills: 0 | Status: SHIPPED | OUTPATIENT
Start: 2019-11-15 | End: 2019-11-20

## 2019-11-15 RX ORDER — ALBUTEROL SULFATE 90 UG/1
2 AEROSOL, METERED RESPIRATORY (INHALATION) EVERY 6 HOURS
Qty: 1 INHALER | Refills: 0 | Status: SHIPPED | OUTPATIENT
Start: 2019-11-15 | End: 2020-08-14

## 2019-11-15 RX ADMIN — ALBUTEROL SULFATE 2.5 MG: 0.83 SOLUTION RESPIRATORY (INHALATION) at 11:43

## 2019-11-15 ASSESSMENT — MIFFLIN-ST. JEOR: SCORE: 1504.6

## 2019-11-15 NOTE — PROGRESS NOTES
SUBJECTIVE:                                                    Sharlene Natarajan is a 39 year old female who presents to clinic today for the following health issues:    ED/UC Followup:    Facility:  [facility]   Date of visit: 11/09/2019  Reason for visit: Pneumonia   Current Status:  Patient said she finished her Zpack but is still coughing.      In clinic on 11/6/2019  To emergency room on 11/9/2019 - cough was so persistent that she couldn't catch her breath     Cough has improved but still feels short of breath.  No wheezing   No f/c   Finished zpak two days ago   Overall feeling better     Both daughters diagnosed with strep this week   No sore throat       Chest XR,  PA & LAT     Narrative     CHEST 2 VIEWS  11/9/2019 6:27 AM      HISTORY: Two week history of nasal congestion and cough. Palpitations  and tachycardia.     COMPARISON: None.     FINDINGS: A small region of hazy opacities in the left lung base. The  right lung is clear. Normal-sized cardiac silhouette.        Impression     IMPRESSION: A small region of hazy opacities in the left lung base,  most likely representing pneumonia.       Review of systems:  No headache  No n/v   No diarrhea/constipation   No rash      Problem list and histories reviewed & adjusted, as indicated.  Additional history: as documented     Patient Active Problem List   Diagnosis     Female infertility     CARDIOVASCULAR SCREENING; LDL GOAL LESS THAN 130     Depression     WINDY III with severe dysplasia     Depression with anxiety     Essential hypertension, benign     Current Outpatient Medications   Medication     guaiFENesin-codeine (ROBITUSSIN AC) 100-10 MG/5ML solution     ibuprofen (ADVIL,MOTRIN) 400 MG tablet     Probiotic Product (PROBIOTIC + OMEGA-3 PO)     benzonatate (TESSALON) 200 MG capsule     No current facility-administered medications for this visit.         Allergies   Allergen Reactions     Penicillins Hives          OBJECTIVE:                  "                                   /83   Pulse 89   Temp 98.4  F (36.9  C) (Tympanic)   Resp 18   Ht 1.676 m (5' 6\")   Wt 81.3 kg (179 lb 3.2 oz)   LMP 10/26/2019 (Approximate)   SpO2 96%   BMI 28.92 kg/m   Body mass index is 28.92 kg/m .   GENERAL - Pt is alert and oriented in no acute distress.  Affect is appropriate. Good eye contact.  HEET - Head is normocephalic, atraumatic.    PERRLA,EEMI. Conjunctiva are free of icterus or erythema.    TMs bilaterally normal.    Oropharynx free of masses and lesions, no tonsillar exudate or petechiae.    NECK - Neck is supple w/o LA or thyromegaly  RESPIRATORY - Clear to auscultation bilaterally.  No wheezing noted  CV - RRR, no murmurs, rubs, gallops.   EXTREM - No edema.      Clinic course - She was given an albuterol neb treatment. Response was good. Breathing more easily, cough lessened.   O2 sats before the neb were 96 and afterwards were 97         ASSESSMENT/PLAN:                                                        (J98.01) Bronchospasm  (primary encounter diagnosis)  Comment: No indication that infection continues. Will treat with albuterol and prednisone to lessen symptoms - Discussed risks/benefits/side effects with the patient.   If symptoms persist/change/worsen, should be seen again   Plan: DEPRESSION ACTION PLAN (DAP), albuterol         (PROVENTIL) neb solution 2.5 mg,         INHALATION/NEBULIZER TREATMENT, INITIAL,         predniSONE (DELTASONE) 20 MG tablet, albuterol         (PROAIR HFA/PROVENTIL HFA/VENTOLIN HFA) 108 (90        Base) MCG/ACT inhaler            (R05) Cough  Comment: no need for rexray today given improvement in symptoms, no fever, and normal sats. She agrees.  If symptoms persist/change/worsen, consider rexray   Plan: DEPRESSION ACTION PLAN (DAP), albuterol         (PROVENTIL) neb solution 2.5 mg,         INHALATION/NEBULIZER TREATMENT, INITIAL,         predniSONE (DELTASONE) 20 MG tablet, albuterol         (PROAIR " HFA/PROVENTIL HFA/VENTOLIN HFA) 108 (90        Base) MCG/ACT inhaler            (J18.9) Pneumonia of left lung due to infectious organism, unspecified part of lung  Comment: reviewed ER notes with her   Plan: DEPRESSION ACTION PLAN (DAP), albuterol         (PROVENTIL) neb solution 2.5 mg,         INHALATION/NEBULIZER TREATMENT, INITIAL,         predniSONE (DELTASONE) 20 MG tablet, albuterol         (PROAIR HFA/PROVENTIL HFA/VENTOLIN HFA) 108 (90        Base) MCG/ACT inhaler          NORMA Olivo MD     [facility]

## 2019-11-15 NOTE — NURSING NOTE
Clinic Administered Medication Documentation    MEDICATION LIST: Inhalable/Nebs Medication Documentation    Patient was given Albuterol Sulfate Neb. Prior to medication administration, verified patients identity using patient s name and date of birth. Please see MAR and medication order for additional information.    Exp- 10/2020   Kristi Pacheco LPN

## 2019-11-15 NOTE — LETTER
My Depression Action Plan  Name: Sharlene Natarajan   Date of Birth 1980  Date: 11/15/2019    My doctor: Jackie Olivo   My clinic: [address] [address] [phone]          GREEN    ZONE   Good Control    What it looks like:     Things are going generally well. You have normal up s and down s. You may even feel depressed from time to time, but bad moods usually last less than a day.   What you need to do:  1. Continue to care for yourself (see self care plan)  2. Check your depression survival kit and update it as needed  3. Follow your physician s recommendations including any medication.  4. Do not stop taking medication unless you consult with your physician first.           YELLOW         ZONE Getting Worse    What it looks like:     Depression is starting to interfere with your life.     It may be hard to get out of bed; you may be starting to isolate yourself from others.    Symptoms of depression are starting to last most all day and this has happened for several days.     You may have suicidal thoughts but they are not constant.   What you need to do:     1. Call your care team, your response to treatment will improve if you keep your care team informed of your progress. Yellow periods are signs an adjustment may need to be made.     2. Continue your self-care, even if you have to fake it!    3. Talk to someone in your support network    4. Open up your depression survival kit           RED    ZONE Medical Alert - Get Help    What it looks like:     Depression is seriously interfering with your life.     You may experience these or other symptoms: You can t get out of bed most days, can t work or engage in other necessary activities, you have trouble taking care of basic hygiene, or basic responsibilities, thoughts of suicide or death that will not go away, self-injurious behavior.     What you need to do:  1. Call your care team and  request a same-day appointment. If they are not available (weekends or after hours) call your local crisis line, emergency room or 911.            Depression Self Care Plan / Survival Kit    Self-Care for Depression  Here s the deal. Your body and mind are really not as separate as most people think.  What you do and think affects how you feel and how you feel influences what you do and think. This means if you do things that people who feel good do, it will help you feel better.  Sometimes this is all it takes.  There is also a place for medication and therapy depending on how severe your depression is, so be sure to consult with your medical provider and/ or Behavioral Health Consultant if your symptoms are worsening or not improving.     In order to better manage my stress, I will:    Exercise  Get some form of exercise, every day. This will help reduce pain and release endorphins, the  feel good  chemicals in your brain. This is almost as good as taking antidepressants!  This is not the same as joining a gym and then never going! (they count on that by the way ) It can be as simple as just going for a walk or doing some gardening, anything that will get you moving.      Hygiene   Maintain good hygiene (Get out of bed in the morning, Make your bed, Brush your teeth, Take a shower, and Get dressed like you were going to work, even if you are unemployed).  If your clothes don't fit try to get ones that do.    Diet  I will strive to eat foods that are good for me, drink plenty of water, and avoid excessive sugar, caffeine, alcohol, and other mood-altering substances.  Some foods that are helpful in depression are: complex carbohydrates, B vitamins, flaxseed, fish or fish oil, fresh fruits and vegetables.    Psychotherapy  I agree to participate in Individual Therapy (if recommended).    Medication  If prescribed medications, I agree to take them.  Missing doses can result in serious side effects.  I understand that  drinking alcohol, or other illicit drug use, may cause potential side effects.  I will not stop my medication abruptly without first discussing it with my provider.    Staying Connected With Others  I will stay in touch with my friends, family members, and my primary care provider/team.    Use your imagination  Be creative.  We all have a creative side; it doesn t matter if it s oil painting, sand castles, or mud pies! This will also kick up the endorphins.    Witness Beauty  (AKA stop and smell the roses) Take a look outside, even in mid-winter. Notice colors, textures. Watch the squirrels and birds.     Service to others  Be of service to others.  There is always someone else in need.  By helping others we can  get out of ourselves  and remember the really important things.  This also provides opportunities for practicing all the other parts of the program.    Humor  Laugh and be silly!  Adjust your TV habits for less news and crime-drama and more comedy.    Control your stress  Try breathing deep, massage therapy, biofeedback, and meditation. Find time to relax each day.     My support system    Clinic Contact:  Phone number:    Contact 1:  Phone number:    Contact 2:  Phone number:    Restoration/:  Phone number:    Therapist:  Phone number:    Local crisis center:    Phone number:    Other community support:  Phone number:

## 2019-12-30 ENCOUNTER — ALLIED HEALTH/NURSE VISIT (OUTPATIENT)
Dept: FAMILY MEDICINE | Facility: CLINIC | Age: 39
End: 2019-12-30
Payer: COMMERCIAL

## 2019-12-30 DIAGNOSIS — Z23 NEED FOR PROPHYLACTIC VACCINATION AND INOCULATION AGAINST INFLUENZA: Primary | ICD-10-CM

## 2019-12-30 PROCEDURE — 90686 IIV4 VACC NO PRSV 0.5 ML IM: CPT

## 2019-12-30 PROCEDURE — 99207 ZZC NO CHARGE NURSE ONLY: CPT

## 2019-12-30 PROCEDURE — 90471 IMMUNIZATION ADMIN: CPT

## 2020-01-07 ENCOUNTER — VIRTUAL VISIT (OUTPATIENT)
Dept: FAMILY MEDICINE | Facility: OTHER | Age: 40
End: 2020-01-07

## 2020-01-08 NOTE — PROGRESS NOTES
"Date: 2020 18:15:19  Clinician: Alberto Rivas  Clinician NPI: [id number]  Patient: Sharlene Natarajan  Patient : 1980  Patient Address: [address] [place] [address]  Patient Phone: [phone]  Visit Protocol: UTI  Patient Summary:  Sharlene is a 39 year old ( : 1980 ) female who initiated a Visit for a presumed bladder infection. When asked the question \"Please sign me up to receive news, health information and promotions. \", Sharlene responded \"No\".   Her symptoms started 4-6 days ago and consist of dysuria, feeling as if the bladder is never empty, urinary frequency, urgency, urinary incontinence, and vaginal itching.   Symptom details   Urine color: The color of her urine is yellow.    Denied symptoms include foul-smelling urine, nausea, flank pain, abdominal pain, chills, vaginal discharge, and vomiting. She does not feel feverish.   Over-the-counter medications or home remedies used to relieve the current symptoms as reported by the patient (free text): Azzo   Precipitating events  Sharlene denies having a sexually transmitted disease.  Pertinent medical history  Sharlene has had a bladder infection before but has not had any in the past 12 months. Her current symptoms are similar to her previous bladder infection symptoms.   She is not sure what antibiotics have been effective in treating her past bladder infections.   Sharlene typically gets a yeast infection when she takes antibiotics. She has used fluconazole (Diflucan) to treat previous yeast infections. She is not sure if she has needed 1 or 2 doses of fluconazole (Diflucan) for symptoms to resolve in the past.   Sharlene has not been prescribed antibiotics to prevent frequent or repeated bladder infections in the past. She has not experienced problems or side effects with any of the common antibiotics used to treat bladder infections.   Sharlene does not have a history of kidney stones. She has not used a " catheter or been a patient in a hospital or nursing home in the past 2 weeks.   Sharlene does not smoke or use smokeless tobacco.   She denies pregnancy and denies breastfeeding. She has menstruated in the past month.     MEDICATIONS: No current medications, ALLERGIES: Penicillins  Clinician Response:  Dear Sharlene,  Based on the information you have provided, you likely have an acute urinary tract infection, also called a bladder infection. Bladder infections occur when bacteria from the outside of the body enters the urinary tract. Any part of the urinary system can be infected, but the bladder is the most common.  Medication information  I am prescribing:     Nitrofurantoin monohyd/m-cryst (Macrobid) 100 mg oral capsule. Take 1 capsule by mouth every 12 hours for 5 days. Take this medication with food. There are no refills with this prescription.   The medication I prescribed for your bladder infection is an antibiotic. Continue taking the medication until it is gone even if you feel better.   Yeast infections can be a common side effect of antibiotics. The most common symptom of a yeast infection is itchiness in and around the vagina. Other signs and symptoms include burning, redness, or a thick, white vaginal discharge that looks like cottage cheese and does not have a bad smell.  Self care  Urination helps to flush bacteria from the urinary tract. For this reason, drinking water and urinating often helps relieve some urinary symptoms and can decrease your risk of getting bladder infections in the future.  Other steps you can take to prevent future bladder infections include:     Wipe front to back after using the bathroom    Urinate after sexual intercourse    Avoid using deodorant sprays, douches, or powders in the vaginal area     When to seek care  Please make an appointment to be seen in a clinic or urgent care if any of the following occur:     You develop new symptoms or your symptoms become worse     You have medication side effects that make it difficult to take them as prescribed    Your symptoms do not improve within 1-2 days of starting treatment    You have symptoms of a bladder infection that return shortly after completing treatment     It is possible to have an allergic reaction to an antibiotic even if you have not had one in the past. If you notice a new rash, significant swelling, or difficulty breathing, stop taking this medication immediately and go to a clinic or urgent care.   Diagnosis: Acute uncomplicated bladder infection  Diagnosis ICD: N39.0  Prescription: nitrofurantoin monohyd/m-cryst (Macrobid) 100 mg oral capsule 10 capsule, 5 days supply. Take 1 capsule by mouth every 12 hours for 5 days. Refills: 0, Refill as needed: no, Allow substitutions: yes  Pharmacy: [id number] - [phone] - [address] [place] [address]

## 2020-01-28 ENCOUNTER — VIRTUAL VISIT (OUTPATIENT)
Dept: FAMILY MEDICINE | Facility: OTHER | Age: 40
End: 2020-01-28

## 2020-01-29 NOTE — PROGRESS NOTES
"Date: 2020 18:27:49  Clinician: Clive Pillai  Clinician NPI: [id number]  Patient: Sharlene Natarajan  Patient : 1980  Patient Address: [address] [place] [address]  Patient Phone: [phone]  Visit Protocol: URI  Patient Summary:  Sharlene is a 39 year old ( : 1980 ) female who initiated a Visit for cold, sinus infection, or influenza. When asked the question \"Please sign me up to receive news, health information and promotions. \", Sharlene responded \"Yes\".    Sharlene states her symptoms started 1-2 days ago.   Her symptoms consist of a headache, enlarged lymph nodes, chills, nasal congestion, malaise, rhinitis, a cough, and myalgia. She is experiencing mild difficulty breathing with activities but can speak normally in full sentences. Sharlene also feels feverish.   Symptom details     Nasal secretions: The color of her mucus is clear and white.    Cough: Sharlene coughs every 5-10 minutes and her cough is more bothersome at night. Phlegm comes into her throat when she coughs. She believes the phlegm causes the cough. The color of the phlegm is clear and white.     Temperature: Her current temperature is 101 degrees Fahrenheit. Sharlene has had a temperature over 100 degrees Fahrenheit for 1-2 days.     Headache: She states the headache is severe (7-9 on a 10 point pain scale).      Sharlene denies having sore throat, ear pain, wheezing, teeth pain, and facial pain or pressure. She also denies taking antibiotic medication for the symptoms, having recent facial or sinus surgery in the past 60 days, and having a sinus infection within the past year.   Precipitating events  She has recently been exposed to someone with influenza. Sharlene has not been in close contact with any high risk individuals.   Pertinent medical history  Sharlene typically gets a yeast infection when she takes antibiotics. She has used fluconazole (Diflucan) to treat previous yeast " infections. She is not sure if she has needed 1 or 2 doses of fluconazole (Diflucan) for symptoms to resolve in the past.   Weight: 174 lbs   Sharlene does not smoke or use smokeless tobacco.   She denies pregnancy and denies breastfeeding. She has menstruated in the past month.   Weight: 174 lbs    MEDICATIONS: No current medications, ALLERGIES: Penicillins, Penicillins  Clinician Response:  Dear Sharlene,  Based on the information provided, you have influenza (the flu). The flu is a very contagious infection that can lead to a serious illness in anyone, but some are at risk for becoming more sick than others. For this reason, it is important to know you have the flu so you can take steps to prevent spreading it to others.  Medication information  I am prescribing:     Oseltamivir (Tamiflu) 75 mg oral capsule. Take 1 capsule by mouth 2 times per day for 5 days. There are no refills with this prescription.   Self care  The following tips will keep you as comfortable as possible while you recover:     Rest    Drink plenty of water and other liquids    Take a hot shower to loosen congestion    Take a spoonful of honey to reduce your cough     If you have a fever, stay home until your temperature has returned to normal for 24 hours and you feel well enough for daily activities. And of course, wash your hands often to prevent spreading the flu and other illnesses. However, the best way to prevent the flu is to get a flu shot before each flu season.  When to seek care  Please be seen in a clinic or urgent care if new symptoms develop, or symptoms become worse.   Diagnosis: Influenza  Diagnosis ICD: J11.1  Prescription: oseltamivir (Tamiflu) 75 mg oral capsule 10 capsule, 5 days supply. Take 1 capsule by mouth 2 times per day for 5 days. Refills: 0, Refill as needed: no, Allow substitutions: yes  Pharmacy: [id number] - [phone] - [address] [place] [address]

## 2020-02-01 ENCOUNTER — APPOINTMENT (OUTPATIENT)
Dept: GENERAL RADIOLOGY | Facility: CLINIC | Age: 40
End: 2020-02-01
Attending: PHYSICIAN ASSISTANT
Payer: COMMERCIAL

## 2020-02-01 ENCOUNTER — HOSPITAL ENCOUNTER (EMERGENCY)
Facility: CLINIC | Age: 40
Discharge: HOME | End: 2020-02-01
Attending: PHYSICIAN ASSISTANT | Admitting: PHYSICIAN ASSISTANT
Payer: COMMERCIAL

## 2020-02-01 VITALS
SYSTOLIC BLOOD PRESSURE: 145 MMHG | RESPIRATION RATE: 16 BRPM | TEMPERATURE: 97.6 F | HEART RATE: 97 BPM | BODY MASS INDEX: 28.25 KG/M2 | OXYGEN SATURATION: 100 % | DIASTOLIC BLOOD PRESSURE: 93 MMHG | WEIGHT: 175 LBS

## 2020-02-01 DIAGNOSIS — J40 BRONCHITIS: ICD-10-CM

## 2020-02-01 PROCEDURE — 71046 X-RAY EXAM CHEST 2 VIEWS: CPT

## 2020-02-01 PROCEDURE — 99214 OFFICE O/P EST MOD 30 MIN: CPT | Mod: Z6 | Performed by: PHYSICIAN ASSISTANT

## 2020-02-01 PROCEDURE — G0463 HOSPITAL OUTPT CLINIC VISIT: HCPCS | Performed by: PHYSICIAN ASSISTANT

## 2020-02-01 RX ORDER — ALBUTEROL SULFATE 90 UG/1
2 AEROSOL, METERED RESPIRATORY (INHALATION) EVERY 6 HOURS PRN
Qty: 18 G | Refills: 0 | Status: SHIPPED | OUTPATIENT
Start: 2020-02-01 | End: 2020-08-14

## 2020-02-01 RX ORDER — BENZONATATE 100 MG/1
100-200 CAPSULE ORAL 3 TIMES DAILY PRN
Qty: 42 CAPSULE | Refills: 0 | Status: SHIPPED | OUTPATIENT
Start: 2020-02-01 | End: 2020-08-14

## 2020-02-01 RX ORDER — PREDNISONE 20 MG/1
TABLET ORAL
Qty: 10 TABLET | Refills: 0 | Status: SHIPPED | OUTPATIENT
Start: 2020-02-01 | End: 2020-08-14

## 2020-02-01 NOTE — ED AVS SNAPSHOT
[facility] Department  [address] [facility] [address]  Phone:  [phone]  Fax:  [phone]                                    Sharlene Natarajan   MRN: [id number]    Department:  [facility] Emergency Department   Date of Visit:  2/1/2020           After Visit Summary Signature Page    I have received my discharge instructions, and my questions have been answered. I have discussed any challenges I see with this plan with the nurse or doctor.    ..........................................................................................................................................  Patient/Patient Representative Signature      ..........................................................................................................................................  Patient Representative Print Name and Relationship to Patient    ..................................................               ................................................  Date                                   Time    ..........................................................................................................................................  Reviewed by Signature/Title    ...................................................              ..............................................  Date                                               Time          22EPIC Rev 08/18

## 2020-02-01 NOTE — ED TRIAGE NOTES
Patient with URI symptoms for 1 week. Patient reports now feeling settled in lungs. Chest heaviness, more sob, cough getting worse. Patient reports had pneumonia in the fall and feeling similar. Ladonna Yeager LPN on 2/1/2020 at 2:05 PM

## 2020-02-01 NOTE — ED PROVIDER NOTES
History     Chief Complaint   Patient presents with     Cough     HPI  Sharlene Natarajan is a 39 year old female who presents to the urgent care with concern over illness which been present for approximately last week.  Patient states that initially became ill with symptoms of fever up to 101, mild sore throat, nasal congestion, cough.  She did not a visit who suggested it was likely influenza and was given prescription for Tamiflu however patient did not actually start because her symptoms began to improve the next day.  She states that she felt a little bit better for 2 days however beginning 3 days ago her cough became more severe accompanied by chest tightness, hoarse voice, shortness of breath, possible wheezing.  Her fever has not recurred.  She denies any history of asthma, COPD or other breathing related disorders however has had a history of pneumonia.  She is a non-smoker.  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 Plan colp due by 2/29/18 12/14/17 [place] Bx - WINDY 2 & 3, ECC - Negative. Plan LEEP  2/2/18: LEEP: WINDY 3; margins clear--cotesting 1 year  3/22/19 Pap: NIL, neg HR HPV. Plan cotest in 1 year.         CARDIOVASCULAR SCREENING; LDL GOAL LESS THAN 130 12/10/2012     Priority: Medium     Female infertility 05/12/2010     Priority: Medium     Cycle #1: Clomid 50mg day 3-7--Prog 10.8  Cycle #2: Clomid 50mg day 3-7; HCG trigger, IUI, Prometrium starting cycle day 22; day 21  prog=2.3  Cycle #3: Clomid 100mg day 3-7, day 10 sono showed borderline hyperstimulation; no trigger or IUI done; consider change to Letrazole next cycle or referral to [facility]; day 21 prog low  Cycle 4: Letrazole 2.5mg day 3-7  Referred to [facility]--4 cycles with IUI; with only 1 chemical pregnancy        Past Medical History:    Past Medical History:   Diagnosis Date     Anxiety      Cervical high risk HPV (human papillomavirus) test positive 2017     Chickenpox      Chlamydia 2000     Depression      Infertility      Mild preeclampsia      Past Surgical History:    Past Surgical History:   Procedure Laterality Date      SECTION  2011    Procedure: SECTION;  Section; Surgeon:ABELINO CABAN; Location:[facility] OR      SECTION  2014    Procedure:  SECTION;   Section;  Surgeon: Abelino Caban MD;  Location: [facility] OR     Family History:    Family History   Problem Relation Age of Onset     Cancer Mother         cervical     Heart Disease Mother         MVP     Thyroid Disease Mother      Gastrointestinal Disease Mother         IBS     Diabetes Mother      Hypertension Mother      Myocardial Infarction Mother      Psychotic Disorder Father         bipolar     Depression Father      Diabetes Maternal Grandfather      Prostate Cancer Paternal Grandfather      Heart Disease Paternal Grandfather         pacemaker     Allergies Sister      Depression Sister      Osteoporosis Paternal Grandmother      Social History:  Marital Status:   [2]  Social History     Tobacco Use     Smoking status: Never Smoker     Smokeless tobacco: Never Used   Substance Use Topics     Alcohol use: No     Alcohol/week: 0.0 standard drinks     Drug use: No        Medications:    albuterol (PROAIR HFA/PROVENTIL HFA/VENTOLIN HFA) 108 (90 Base) MCG/ACT inhaler  benzonatate (TESSALON) 200 MG capsule  guaiFENesin-codeine (ROBITUSSIN AC) 100-10 MG/5ML solution  ibuprofen (ADVIL,MOTRIN)  400 MG tablet  Probiotic Product (PROBIOTIC + OMEGA-3 PO)      Review of Systems  CONSTITUTIONAL:POSITIVE  for resolved fever, chills, myalgias   INTEGUMENTARY/SKIN: NEGATIVE for worrisome rashes, moles or lesions  EYES: NEGATIVE for vision changes or irritation  ENT/MOUTH: POSITIVE for nasal congestion, right ear pain and  resolved sore throat   RESP:POSITIVE for cough, chest tightness, shortness of breath, wheezing  GI: NEGATIVE for vomiting, diarrhea, abdominal pain   Physical Exam   BP: (!) 145/93  Pulse: 97  Temp: 97.6  F (36.4  C)  Resp: 16  Weight: 79.4 kg (175 lb)  SpO2: 100 %  Physical Exam  GENERAL APPEARANCE: healthy, alert and no distress  EYES: EOMI,  PERRL, conjunctiva clear  HENT: ear canals are clear.  There is serous effusion present behind the right TM.  Posterior pharynx is nonerythematous without exudate however she does have uvular and tonsillar enlargement  NECK: supple, nontender, no lymphadenopathy  RESP: lungs clear to auscultation - no rales, rhonchi or wheezes, however semi frequent harsh cough present   CV: regular rates and rhythm, normal S1 S2, no murmur noted  SKIN: no suspicious lesions or rashes  ED Course        Procedures        Critical Care time:  none          Results for orders placed or performed during the hospital encounter of 02/01/20   Chest XR,  PA & LAT     Status: None    Narrative    XR CHEST TWO VIEWS   2/1/2020 2:28 PM     HISTORY: Cough, rule out pneumonia.    COMPARISON: Chest x-ray on 11/9/2019      Impression    IMPRESSION: No acute airspace disease.    ANABELA TILLMAN MD     Medications - No data to display    Assessments & Plan (with Medical Decision Making)     I have reviewed the nursing notes.  I have reviewed the findings, diagnosis, plan and need for follow up with the patient.       Discharge Medication List as of 2/1/2020  2:47 PM      START taking these medications    Details   !! albuterol (PROAIR HFA/PROVENTIL HFA/VENTOLIN HFA) 108 (90 Base)  MCG/ACT inhaler Inhale 2 puffs into the lungs every 6 hours as needed, Disp-18 g, R-0, E-PrescribePharmacy may dispense brand covered by insurance (Proair, or proventil or ventolin or generic albuterol inhaler)      !! benzonatate (TESSALON) 100 MG capsule Take 1-2 capsules (100-200 mg) by mouth 3 times daily as needed, Disp-42 capsule, R-0, E-Prescribe       !! - Potential duplicate medications found. Please discuss with provider.        Final diagnoses:   Bronchitis likely secondary to influenza     39-year-old female presents to the urgent care with concern over 1 week history of illness with initial fever up to 101, sore throat, nasal congestion and cough still likely influenza with worsening cough, chest congestion tightness, hoarse voice, shortness of breath and wheezing in the last several days.  She had stable vital signs upon arrival.  Physical exam findings as described above.  As part of evaluation she did have chest x-ray which was negative for acute infiltrate, pneumothorax, pleural effusion or change in cardiopulmonary vasculature.  Symptoms are consistent with bronchitis likely secondary to influenza given her history.  I do not suspect pertussis, PE further evaluation.  She was discharged home stable with prescription for albuterol, tessalon. Prednisone.  Follow up with PCP if no improvement in 3-5 days.  Worrisome reasons to return to ER/UC sooner discussed.     Disclaimer: This note consists of symbols derived from keyboarding, dictation, and/or voice recognition software. As a result, there may be errors in the script that have gone undetected.  Please consider this when interpreting information found in the chart.      2/1/2020   [facility] EMERGENCY DEPARTMENT     Kristi Mccormick PA-C  02/04/20 [id number]

## 2020-08-14 ENCOUNTER — OFFICE VISIT (OUTPATIENT)
Dept: OBGYN | Facility: CLINIC | Age: 40
End: 2020-08-14
Payer: COMMERCIAL

## 2020-08-14 VITALS
BODY MASS INDEX: 27.38 KG/M2 | WEIGHT: 170.4 LBS | SYSTOLIC BLOOD PRESSURE: 135 MMHG | HEART RATE: 86 BPM | TEMPERATURE: 98.4 F | DIASTOLIC BLOOD PRESSURE: 91 MMHG | RESPIRATION RATE: 16 BRPM | HEIGHT: 66 IN

## 2020-08-14 DIAGNOSIS — F41.9 ANXIETY: ICD-10-CM

## 2020-08-14 DIAGNOSIS — F32.A DEPRESSION, UNSPECIFIED DEPRESSION TYPE: ICD-10-CM

## 2020-08-14 DIAGNOSIS — Z00.00 ROUTINE GENERAL MEDICAL EXAMINATION AT A HEALTH CARE FACILITY: Primary | ICD-10-CM

## 2020-08-14 PROCEDURE — 87624 HPV HI-RISK TYP POOLED RSLT: CPT | Performed by: PHYSICIAN ASSISTANT

## 2020-08-14 PROCEDURE — 99396 PREV VISIT EST AGE 40-64: CPT | Performed by: PHYSICIAN ASSISTANT

## 2020-08-14 PROCEDURE — 88175 CYTOPATH C/V AUTO FLUID REDO: CPT | Performed by: PHYSICIAN ASSISTANT

## 2020-08-14 ASSESSMENT — ANXIETY QUESTIONNAIRES
5. BEING SO RESTLESS THAT IT IS HARD TO SIT STILL: NEARLY EVERY DAY
GAD7 TOTAL SCORE: 20
2. NOT BEING ABLE TO STOP OR CONTROL WORRYING: NEARLY EVERY DAY
7. FEELING AFRAID AS IF SOMETHING AWFUL MIGHT HAPPEN: MORE THAN HALF THE DAYS
6. BECOMING EASILY ANNOYED OR IRRITABLE: NEARLY EVERY DAY
IF YOU CHECKED OFF ANY PROBLEMS ON THIS QUESTIONNAIRE, HOW DIFFICULT HAVE THESE PROBLEMS MADE IT FOR YOU TO DO YOUR WORK, TAKE CARE OF THINGS AT HOME, OR GET ALONG WITH OTHER PEOPLE: SOMEWHAT DIFFICULT
1. FEELING NERVOUS, ANXIOUS, OR ON EDGE: NEARLY EVERY DAY
3. WORRYING TOO MUCH ABOUT DIFFERENT THINGS: NEARLY EVERY DAY

## 2020-08-14 ASSESSMENT — PATIENT HEALTH QUESTIONNAIRE - PHQ9
5. POOR APPETITE OR OVEREATING: NEARLY EVERY DAY
SUM OF ALL RESPONSES TO PHQ QUESTIONS 1-9: 17

## 2020-08-14 ASSESSMENT — MIFFLIN-ST. JEOR: SCORE: 1459.68

## 2020-08-14 NOTE — NURSING NOTE
"Initial BP (!) 135/91 (BP Location: Left arm, Patient Position: Chair, Cuff Size: Adult Regular)   Pulse 86   Temp 98.4  F (36.9  C) (Tympanic)   Resp 16   Ht 1.676 m (5' 6\")   Wt 77.3 kg (170 lb 6.4 oz)   LMP 08/04/2020   Breastfeeding No   BMI 27.50 kg/m   Estimated body mass index is 27.5 kg/m  as calculated from the following:    Height as of this encounter: 1.676 m (5' 6\").    Weight as of this encounter: 77.3 kg (170 lb 6.4 oz). .    Lanie Dewitt CMA    "

## 2020-08-14 NOTE — PROGRESS NOTES
SUBJECTIVE:   CC: Sharlene Natarajan is an 40 year old woman who presents for preventive health visit.     Healthy Habits:    Do you get at least three servings of calcium containing foods daily (dairy, green leafy vegetables, etc.)? yes    Amount of exercise or daily activities, outside of work: 3-4 day(s) per week    Problems taking medications regularly No    Medication side effects: No    Have you had an eye exam in the past two years? yes    Do you see a dentist twice per year? no    Do you have sleep apnea, excessive snoring or daytime drowsiness?no    Patient here for annual exam.  She states she has no concerns for her annual exam other than the fact that she has been feeling more anxious with constant worrying, restlessness, irritability and difficulty finishing tasks.  She states this is been increasing over the past 3 months and just feeling more down.  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  4 years ago patient was initially on Wellbutrin but she had side effects she was also on Cymbalta and Paxil.  Patient states that the Paxil did not seem to do much for her.  She like to try something different.  Patient denies any thoughts of hurting herself or others she feels safe at home.  Patient also would like to start mammograms and would like basic labs to.   -------------------------------------    Today's PHQ-2 Score:   PHQ-2 ( 1999 Pfizer) 8/14/2020 3/22/2019   Q1: Little interest or pleasure in doing things 3 2   Q2: Feeling down, depressed or hopeless 3 2   PHQ-2 Score 6 4       Abuse: Current or Past(Physical, Sexual or Emotional)- No  Do you feel safe in your environment? Yes        Social History     Tobacco Use      Smoking status: Never Smoker     Smokeless tobacco: Never Used   Substance Use Topics     Alcohol use: No     Alcohol/week: 0.0 standard drinks     If you drink alcohol do you typically have >3 drinks per day or >7 drinks per week? No                     Reviewed orders with patient.  Reviewed health maintenance and updated orders accordingly - Yes  Lab work is in process  Labs reviewed in EPIC  BP Readings from Last 3 Encounters:   20 (!) 135/91   20 (!) 145/93   11/15/19 138/83    Wt Readings from Last 3 Encounters:   20 77.3 kg (170 lb 6.4 oz)   20 79.4 kg (175 lb)   11/15/19 81.3 kg (179 lb 3.2 oz)                  Patient Active Problem List   Diagnosis     Female infertility     CARDIOVASCULAR SCREENING; LDL GOAL LESS THAN 130     Depression     WINDY III with severe dysplasia     Depression with anxiety     Essential hypertension, benign     Past Surgical History:   Procedure Laterality Date      SECTION  2011    Procedure: SECTION;  Section; Surgeon:ABELINO CABAN; Location:[facility]      SECTION  2014    Procedure:  SECTION;   Section;  Surgeon: Abelino Caban MD;  Location: [facility]       Social History     Tobacco Use     Smoking status: Never Smoker     Smokeless tobacco: Never Used   Substance Use Topics     Alcohol use: No     Alcohol/week: 0.0 standard drinks     Family History   Problem Relation Age of Onset     Cancer Mother         cervical     Heart Disease Mother         MVP     Thyroid Disease Mother      Gastrointestinal Disease Mother         IBS     Diabetes Mother      Hypertension Mother      Myocardial Infarction Mother      Psychotic Disorder Father         bipolar     Depression Father      Diabetes Maternal Grandfather      Prostate Cancer Paternal Grandfather      Heart Disease Paternal Grandfather         pacemaker     Allergies Sister      Depression Sister      Osteoporosis Paternal Grandmother           Current Outpatient Medications   Medication Sig Dispense Refill     sertraline (ZOLOFT) 50 MG tablet Take 1 tablet (50 mg) by mouth daily 14 tablet 0     ibuprofen (ADVIL,MOTRIN) 400 MG tablet Take 1-2 tablets (400-800 mg) by mouth every 6 hours as needed (cramping.  Begin after ketorolac doses completed.  Max of 3200mg/day) (Patient not taking: Reported on 8/14/2020) 60 tablet 1     Probiotic Product (PROBIOTIC + OMEGA-3 PO) One day       Allergies   Allergen Reactions     Penicillins Hives     Recent Labs   Lab Test 06/20/18  2245 05/29/18  1007 01/10/17  1105 11/25/15  0916 10/25/13  0754 08/20/13  0915   LDL  --  120*  --  106* 125  --    HDL  --  43*  --  41* 70  --    TRIG  --  198*  --  214* 177*  --    ALT 25  --  20  --   --   --    CR 0.74  --  0.70  --   --   --    GFRESTIMATED 87  --  >90  Non  GFR Calc    --   --   --    GFRESTBLACK >90  --  >90  African American GFR Calc    --   --   --    POTASSIUM 3.7  --  4.0  --   --   --    TSH  --   --   --   --   --  1.98        Mammogram Screening: Patient under age 50, mutual decision reflected in health maintenance.      Pertinent mammograms are reviewed under the imaging tab.  History of abnormal Pap smear: YES - updated in Problem List and Health Maintenance accordingly  PAP / HPV Latest Ref Rng & Units 3/22/2019 11/29/2017 3/19/2014   PAP - NIL NIL NIL   HPV 16 DNA NEG:Negative Negative Positive(A) -   HPV 18 DNA NEG:Negative Negative Negative -   OTHER HR HPV NEG:Negative Negative Negative -     Reviewed and updated as needed this visit by clinical staff  Tobacco  Allergies  Meds  Med Hx  Surg Hx  Fam Hx  Soc Hx        Reviewed and updated as needed this visit by Provider        Past Medical History:   Diagnosis Date     Anxiety      Cervical high risk HPV (human papillomavirus) test positive 11/29/2017    type 16     Chickenpox      Chlamydia 2000     Depression      Infertility      Mild preeclampsia 2011      Past Surgical  "History:   Procedure Laterality Date      SECTION  2011    Procedure: SECTION;  Section; Surgeon:ANNA CABAN; Location:[facility]      SECTION  2014    Procedure:  SECTION;   Section;  Surgeon: Anna Caban MD;  Location: [facility]     OB History    Para Term  AB Living   2 2 2 0 0 2   SAB TAB Ectopic Multiple Live Births   0 0 0 0 2      # Outcome Date GA Lbr Lance/2nd Weight Sex Delivery Anes PTL Lv   2 Term 14 39w3d  3.742 kg (8 lb 4 oz) F CS-LTranv Spinal  GABRIELA      Birth Comments: Repeat      Name: Theresa      Apgar1: 9  Apgar5: 9   1 Term 11 39w0d 21:00 3.629 kg (8 lb) F CS-LTranv EPI N GABRIELA      Birth Comments: arrest of dilation 8cm, Mild PIH      Name: Nova Natarajan      Apgar1: 9  Apgar5: 9       ROS:  CONSTITUTIONAL: NEGATIVE for fever, chills, change in weight  INTEGUMENTARU/SKIN: NEGATIVE for worrisome rashes, moles or lesions  EYES: NEGATIVE for vision changes or irritation  ENT: NEGATIVE for ear, mouth and throat problems  RESP: NEGATIVE for significant cough or SOB  BREAST: NEGATIVE for masses, tenderness or discharge  CV: NEGATIVE for chest pain, palpitations or peripheral edema  GI: NEGATIVE for nausea, abdominal pain, heartburn, or change in bowel habits  : NEGATIVE for unusual urinary or vaginal symptoms. Periods are regular.  MUSCULOSKELETAL: NEGATIVE for significant arthralgias or myalgia  NEURO: NEGATIVE for weakness, dizziness or paresthesias  ENDOCRINE: NEGATIVE for temperature intolerance, skin/hair changes  PSYCHIATRIC: POSITIVE increased anxiety and depression with being more teary, constant worrying, more irritable, restlessness, difficulty finishing tasks.    OBJECTIVE:   BP (!) 135/91 (BP Location: Left arm, Patient Position: Chair, Cuff Size: Adult Regular)   Pulse 86   Temp 98.4  F (36.9  C) (Tympanic)   Resp 16   Ht 1.676 m (5' 6\")   Wt 77.3 kg (170 lb 6.4 oz)   LMP 2020   " Breastfeeding No   BMI 27.50 kg/m    EXAM:  GENERAL: healthy, alert and no distress  EYES: Eyes grossly normal to inspection, PERRL and conjunctivae and sclerae normal  HENT: ear canals and TM's normal, nose and mouth without ulcers or lesions  NECK: no adenopathy, no asymmetry, masses, or scars and thyroid normal to palpation  RESP: lungs clear to auscultation - no rales, rhonchi or wheezes  BREAST: normal without masses, tenderness or nipple discharge and no palpable axillary masses or adenopathy  CV: regular rate and rhythm, normal S1 S2, no S3 or S4, no murmur, click or rub, no peripheral edema and peripheral pulses strong  ABDOMEN: soft, nontender, no hepatosplenomegaly, no masses and bowel sounds normal   (female): normal female external genitalia, normal urethral meatus, vaginal mucosa pink, moist, well rugated, and normal cervix/adnexa/uterus without masses or discharge  MS: no gross musculoskeletal defects noted, no edema  SKIN: no suspicious lesions or rashes  NEURO: Normal strength and tone, mentation intact and speech normal  PSYCH: mentation appears normal, affect normal/bright    Diagnostic Test Results:  Currently in process.     ASSESSMENT/PLAN:   1. Routine general medical examination at a health care facility    - Lipid panel reflex to direct LDL Fasting; Future  - GLUCOSE; Future  - Pap imaged thin layer screen with HPV - recommended age 30 - 65 years (select HPV order below)  - HPV High Risk Types DNA Cervical  - **TSH with free T4 reflex FUTURE anytime; Future  - *MA Screening Digital Bilateral; Future  - **CBC with platelets FUTURE anytime; Future  - **Comprehensive metabolic panel FUTURE anytime; Future    2. Anxiety  Will start patient on zoloft for patient's symptoms of anxiety and depression and will titrate up as needed.  Patient given prescription for 2 weeks and informed to follow-up with the OB/GYN clinic in 10 days to inform them of how she is doing on this and if she needs to  "increase her dose or if the side effects are biting her too much.  Side effects and risk factors with starting this medication were discussed with patient patient is aware and understands this.  Discussed increasing exercise to help with some of her depression and anxiety symptoms.    - sertraline (ZOLOFT) 50 MG tablet; Take 1 tablet (50 mg) by mouth daily  Dispense: 14 tablet; Refill: 0    3. Depression, unspecified depression type  Will start patient on zoloft for patient's symptoms of anxiety and depression and will titrate up as needed.  Patient given prescription for 2 weeks and informed to follow-up with the OB/GYN clinic in 10 days to inform them of how she is doing on this and if she needs to increase her dose or if the side effects are biting her too much.  Side effects and risk factors with starting this medication were discussed with patient patient is aware and understands this.  Discussed increasing exercise to help with some of her depression and anxiety symptoms.    - sertraline (ZOLOFT) 50 MG tablet; Take 1 tablet (50 mg) by mouth daily  Dispense: 14 tablet; Refill: 0    COUNSELING:   Reviewed preventive health counseling, as reflected in patient instructions       Regular exercise       Healthy diet/nutrition       Vision screening    Estimated body mass index is 27.5 kg/m  as calculated from the following:    Height as of this encounter: 1.676 m (5' 6\").    Weight as of this encounter: 77.3 kg (170 lb 6.4 oz).    Weight management plan: Discussed healthy diet and exercise guidelines     reports that she has never smoked. She has never used smokeless tobacco.      Counseling Resources:  ATP IV Guidelines  Pooled Cohorts Equation Calculator  Breast Cancer Risk Calculator  FRAX Risk Assessment  ICSI Preventive Guidelines  Dietary Guidelines for Americans, 2010  USDA's MyPlate  ASA Prophylaxis  Lung CA Screening    Britney Mendoza PA-C  [facility]  "

## 2020-08-15 ASSESSMENT — ANXIETY QUESTIONNAIRES: GAD7 TOTAL SCORE: 20

## 2020-08-19 LAB
COPATH REPORT: NORMAL
PAP: NORMAL

## 2020-08-24 ENCOUNTER — PATIENT OUTREACH (OUTPATIENT)
Dept: URGENT CARE | Facility: URGENT CARE | Age: 40
End: 2020-08-24

## 2020-08-24 NOTE — TELEPHONE ENCOUNTER
11/29/17 NIL pap, + HR HPV 16. Plan colp due by 2/29/18 12/14/17 [place] Bx - WINDY 2 & 3, ECC - Negative. Plan LEEP  2/2/18: LEEP: WINDY 3; margins clear--cotesting 1 year  3/22/19 Pap: NIL, neg HR HPV. Plan cotest in 1 year.  8/14/20 NIL pap, Neg HPV. Plan cotest in 1 year.

## 2020-08-31 ENCOUNTER — MYC MEDICAL ADVICE (OUTPATIENT)
Dept: FAMILY MEDICINE | Facility: CLINIC | Age: 40
End: 2020-08-31

## 2020-08-31 DIAGNOSIS — F32.A DEPRESSION, UNSPECIFIED DEPRESSION TYPE: ICD-10-CM

## 2020-08-31 DIAGNOSIS — F41.9 ANXIETY: ICD-10-CM

## 2020-09-01 ENCOUNTER — MYC MEDICAL ADVICE (OUTPATIENT)
Dept: FAMILY MEDICINE | Facility: CLINIC | Age: 40
End: 2020-09-01

## 2020-09-01 DIAGNOSIS — F32.A DEPRESSION, UNSPECIFIED DEPRESSION TYPE: ICD-10-CM

## 2020-09-01 DIAGNOSIS — F41.9 ANXIETY: ICD-10-CM

## 2020-10-02 ENCOUNTER — ALLIED HEALTH/NURSE VISIT (OUTPATIENT)
Dept: FAMILY MEDICINE | Facility: CLINIC | Age: 40
End: 2020-10-02
Payer: COMMERCIAL

## 2020-10-02 DIAGNOSIS — Z23 NEED FOR PROPHYLACTIC VACCINATION AND INOCULATION AGAINST INFLUENZA: Primary | ICD-10-CM

## 2020-10-02 PROCEDURE — 90471 IMMUNIZATION ADMIN: CPT

## 2020-10-02 PROCEDURE — 90686 IIV4 VACC NO PRSV 0.5 ML IM: CPT

## 2020-12-09 ENCOUNTER — VIRTUAL VISIT (OUTPATIENT)
Dept: FAMILY MEDICINE | Facility: OTHER | Age: 40
End: 2020-12-09

## 2020-12-09 NOTE — PROGRESS NOTES
"Date: 2020 15:38:28  Clinician: Hector Abdul  Clinician NPI: [id number]  Patient: Sharlene Natarajan  Patient : 1980  Patient Address: [address] [place] [address]  Patient Phone: [phone]  Visit Protocol: UTI  Patient Summary:  Sharlene is a 40 year old ( : 1980 ) female who initiated a [facility] Visit for a presumed bladder infection. When asked the question \"Please sign me up to receive news, health information and promotions. \", Sharlene responded \"No\".   Her symptoms started 1-3 days ago and consist of urinary frequency, urgency, dysuria, and feeling as if the bladder is never empty.   Symptom details   Urine color: The color of her urine is yellow.    Denied symptoms include flank pain, abdominal pain, chills, urinary incontinence, vomiting, vaginal itching, foul-smelling urine, nausea, and vaginal discharge. She does not feel feverish.   Over-the-counter medications or home remedies used to relieve the current symptoms as reported by the patient (free text): Azo   Precipitating events  Sharlene denies having a sexually transmitted disease.  Pertinent medical history  Sharlene has had a bladder infection before and has had 1 in the past 12 months. Her most recent bladder infection was not within the last 4 weeks. Her current symptoms are similar to her previous bladder infection symptoms.   She is not sure what antibiotics have been effective in treating her past bladder infections.   Sharlene typically gets a yeast infection when she takes antibiotics. She has used fluconazole (Diflucan) to treat previous yeast infections. 2 doses of fluconazole (Diflucan) has typically been needed for symptoms to resolve in the past.  Sharlene has not been prescribed antibiotics to prevent frequent or repeated bladder infections in the past. She has not experienced problems or side effects with any of the common antibiotics used to treat bladder infections.   Sharlene does " not have a history of kidney stones. She does not have any other urologic conditions. Her provider has not told her she has advanced kidney disease. She has not used a catheter or been a patient in a hospital or nursing home in the past 2 weeks.  She denies having immunosuppressive conditions (e.g., chemotherapy, HIV, organ transplant, long-term use of steroids or other immunosuppressive medications, splenectomy). She does not have diabetes.   Sharlene does not smoke or use smokeless tobacco.   She denies pregnancy and denies breastfeeding. She has menstruated in the past month.     MEDICATIONS: No current medications, ALLERGIES: Penicillins, Penicillins  Clinician Response:  Dear Sharlene,  Based on the information you have provided, you likely have an acute urinary tract infection, also called a bladder infection. Bladder infections occur when bacteria from the outside of the body enters the urinary tract. Any part of the urinary system can be infected, but the bladder is the most common.  Medication information  I am prescribing:     Nitrofurantoin monohyd/m-cryst (Macrobid) 100 mg oral capsule. Take 1 capsule by mouth every 12 hours for 5 days. Take this medication with food. There are no refills with this prescription.   The medication I prescribed for your bladder infection is an antibiotic. Continue taking the medication until it is gone even if you feel better.   Because you usually get a yeast infection when taking antibiotics, I am also prescribing:     Fluconazole (Diflucan) 150 mg oral tablet. Take 1 tablet by mouth in a single dose. Repeat dose in 3 days if symptoms are still present. There are no refills with this prescription.   Self care  Urination helps to flush bacteria from the urinary tract. For this reason, drinking water and urinating often helps relieve some urinary symptoms and can decrease your risk of getting bladder infections in the future.  Other steps you can take to prevent future  bladder infections include:     Wipe front to back after using the bathroom    Urinate after sexual intercourse    Avoid using deodorant sprays, douches, or powders in the vaginal area     When to seek care  Please make an appointment to be seen in a clinic or urgent care if any of the following occur:     You develop new symptoms or your symptoms become worse    You have medication side effects that make it difficult to take them as prescribed    Your symptoms do not improve within 1-2 days of starting treatment    You have symptoms of a bladder infection that return shortly after completing treatment     It is possible to have an allergic reaction to an antibiotic even if you have not had one in the past. If you notice a new rash, significant swelling, or difficulty breathing, stop taking this medication immediately and go to a clinic or urgent care.   Diagnosis: Acute uncomplicated bladder infection  Diagnosis ICD: N39.0  Prescription: fluconazole (Diflucan) 150 mg oral tablet 2 tablet, 4 days supply. Take 1 tablet by mouth in a single dose, repeat dose in 3 days if symptoms are still present. Refills: 0, Refill as needed: no, Allow substitutions: yes  Prescription: nitrofurantoin monohyd/m-cryst (Macrobid) 100 mg oral capsule 10 capsule, 5 days supply. Take 1 capsule by mouth every 12 hours for 5 days. Refills: 0, Refill as needed: no, Allow substitutions: yes  Pharmacy: [facility] - [phone] - [id number] [place] [address]

## 2020-12-20 NOTE — PATIENT INSTRUCTIONS
Follow-up in 2 months so we can see how things are going.    Call Antionette to set up a therapy appointment.    
[facility]

## 2020-12-31 ENCOUNTER — MYC MEDICAL ADVICE (OUTPATIENT)
Dept: FAMILY MEDICINE | Facility: CLINIC | Age: 40
End: 2020-12-31

## 2020-12-31 ENCOUNTER — NURSE TRIAGE (OUTPATIENT)
Dept: NURSING | Facility: CLINIC | Age: 40
End: 2020-12-31

## 2020-12-31 DIAGNOSIS — U07.1 INFECTION DUE TO 2019 NOVEL CORONAVIRUS: Primary | ICD-10-CM

## 2020-12-31 NOTE — TELEPHONE ENCOUNTER
"Patient has tested positive for Covid . And has many questions today.Radha Sinclair RN on 12/31/2020 at 10:34 AM    COVID 19 Nurse Triage Plan/Patient Instructions    Please be aware that novel coronavirus (COVID-19) may be circulating in the community. If you develop symptoms such as fever, cough, or SOB or if you have concerns about the presence of another infection including coronavirus (COVID-19), please contact your health care provider or visit [email or web address].     Disposition/Instructions    Home care recommended. Follow home care protocol based instructions.  Additional COVID19 information to add for patients.   How can I protect others?  If you have symptoms (fever, cough, body aches or trouble breathing): Stay home and away from others (self-isolate) until:    At least 10 days have passed since your symptoms started, And     You ve had no fever--and no medicine that reduces fever--for 1 full day (24 hours), And      Your other symptoms have resolved (gotten better).     If you don t have symptoms, but a test showed that you have COVID-19 (you tested positive):    Stay home and away from others (self-isolate). Follow the tips under \"How do I self-isolate?\" below for 10 days (20 days if you have a weak immune system).    You don't need to be retested for COVID-19 before going back to school or work. As long as you're fever-free and feeling better, you can go back to school, work and other activities after waiting the 10 or 20 days.     How do I self-isolate?    Stay in your own room, even for meals. Use your own bathroom if you can.     Stay away from others in your home. No hugging, kissing or shaking hands. No visitors.    Don t go to work, school or anywhere else.     Clean  high touch  surfaces often (doorknobs, counters, handles, etc.). Use a household cleaning spray or wipes. You ll find a full list on the EPA website:  " www.epa.gov/pesticide-registration/list-n-disinfectants-use-against-sars-cov-2.    Cover your mouth and nose with a mask, tissue or washcloth to avoid spreading germs.    Wash your hands and face often. Use soap and water.    Caregivers in these groups are at risk for severe illness due to COVID-19:  o People 65 years and older  o People who live in a nursing home or long-term care facility  o People with chronic disease (lung, heart, cancer, diabetes, kidney, liver, immunologic)  o People who have a weakened immune system, including those who:  - Are in cancer treatment  - Take medicine that weakens the immune system, such as corticosteroids  - Had a bone marrow or organ transplant  - Have an immune deficiency  - Have poorly controlled HIV or AIDS  - Are obese (body mass index of 40 or higher)  - Smoke regularly    Caregivers should wear gloves while washing dishes, handling laundry and cleaning bedrooms and bathrooms.    Use caution when washing and drying laundry: Don t shake dirty laundry, and use the warmest water setting that you can.    For more tips, go to www.cdc.gov/coronavirus/2019-ncov/downloads/10Things.pdf.    How can I take care of myself?  1. Get lots of rest. Drink extra fluids (unless a doctor has told you not to).     2. Take Tylenol (acetaminophen) for fever or pain. If you have liver or kidney problems, ask your family doctor if it s okay to take Tylenol.     Adults can take either:     650 mg (two 325 mg pills) every 4 to 6 hours, or     1,000 mg (two 500 mg pills) every 8 hours as needed.     Note: Don t take more than 3,000 mg in one day.   Acetaminophen is found in many medicines (both prescribed and over-the-counter medicines). Read all labels to be sure you don t take too much.     For children, check the Tylenol bottle for the right dose. The dose is based on the child s age or weight.    3. If you have other health problems (like cancer, heart failure, an organ transplant or severe  kidney disease): Call your specialty clinic if you don t feel better in the next 2 days.    4. Know when to call 911: Emergency warning signs include:    Trouble breathing or shortness of breath    Pain or pressure in the chest that doesn t go away    Feeling confused like you haven t felt before, or not being able to wake up    Bluish-colored lips or face    What are the symptoms of COVID-19?     The most common symptoms are cough, fever and trouble breathing.     Less common symptoms include body aches, chills, diarrhea (loose, watery poops), fatigue (feeling very tired), headache, runny nose, sore throat and loss of smell.    COVID-19 can cause severe coughing (bronchitis) and lung infection (pneumonia).    How does it spread?     The virus may spread when a person coughs or sneezes into the air. The virus can travel about 6 feet this way, and it can live on surfaces.      Common  (household disinfectants) will kill the virus.    Who is at risk?  Anyone can catch COVID-19 if they re around someone who has the virus.    How can others protect themselves?     Stay away from people who have COVID-19 (or symptoms of COVID-19).    Wash hands often with soap and water. Or, use hand  with at least 60% alcohol.    Avoid touching the eyes, nose or mouth.     Wear a face mask when you go out in public, when sick or when caring for a sick person.    Where can I get more information?    [facility]: About COVID-19: [email or web address]    CDC: What to Do If You re Sick: www.cdc.gov/coronavirus/2019-ncov/about/steps-when-sick.html    CDC: Ending Home Isolation: www.cdc.gov/coronavirus/2019-ncov/hcp/disposition-in-home-patients.html     CDC: Caring for Someone: www.cdc.gov/coronavirus/2019-ncov/if-you-are-sick/care-for-someone.html     [facility]: Interim Guidance for Hospital Discharge to Home: www.health.[facility].mn.[place]/diseases/coronavirus/hcp/hospdischarge.pdf    [facility]  trials (COVID-19 research studies): clinicalaffairs.[facility]/umn-clinical-trials     Below are the COVID-19 hotlines at the [place] Department of Health ([facility]). Interpreters are available.   o For health questions: Call 033-563-6831 or 1-629.753.4683 (7 a.m. to 7 p.m.)  o For questions about schools and childcare: Call 632-988-5823 or 1-931.432.9884 (7 a.m. to 7 p.m.)          Thank you for taking steps to prevent the spread of this virus.  o Limit your contact with others.  o Wear a simple mask to cover your cough.  o Wash your hands well and often.    Resources    [facility]: About COVID-19: [email or web address]    CDC: What to Do If You're Sick: www.cdc.gov/coronavirus/2019-ncov/about/steps-when-sick.html    CDC: Ending Home Isolation: www.cdc.gov/coronavirus/2019-ncov/hcp/disposition-in-home-patients.html     CDC: Caring for Someone: www.cdc.gov/coronavirus/2019-ncov/if-you-are-sick/care-for-someone.html     [facility]: Interim Guidance for Hospital Discharge to Home: www.[facility].mn./diseases/coronavirus/hcp/hospdischarge.pdf    [facility] clinical trials (COVID-19 research studies): clinicalaffairs.[facility]-clinical-trials     Below are the COVID-19 hotlines at the [place] Department of [facility] ([facility]). Interpreters are available.   o For health questions: Call 697-766-8029 or 1-596.383.1895 (7 a.m. to 7 p.m.)  o For questions about schools and childcare: Call 297-744-6782 or 1-109.844.2487 (7 a.m. to 7 p.m.)                         Reason for Disposition    [1] COVID-19 diagnosed by positive lab test AND [2] mild symptoms (e.g., cough, fever, others) AND [3] no complications or SOB    Additional Information    Negative: [1] COVID-19 infection suspected by caller or triager AND [2] mild symptoms (cough, fever, or others) AND [3] no complications or SOB    Negative: Cough present > 3 weeks    Protocols used: CORONAVIRUS (COVID-19) DIAGNOSED OR [id number] 12.1

## 2021-01-26 ENCOUNTER — NURSE TRIAGE (OUTPATIENT)
Dept: NURSING | Facility: CLINIC | Age: 41
End: 2021-01-26

## 2021-01-26 NOTE — TELEPHONE ENCOUNTER
"Pt requesting recommendation from her PCP, MD Jackie Slater.      Pt tested positive for COVID on 12/30/20, did not require medical intervention but still does not have her taste and smell back.  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  Pt questions if she should get this vaccine, considering she already had COVID and it was recently.    Please call her back at [phone], leave a detailed VM if no answer.     Anuja Hernandez, [place]/FNA  "

## 2021-02-17 ENCOUNTER — MYC MEDICAL ADVICE (OUTPATIENT)
Dept: FAMILY MEDICINE | Facility: CLINIC | Age: 41
End: 2021-02-17

## 2021-02-17 DIAGNOSIS — R41.840 LACK OF CONCENTRATION: Primary | ICD-10-CM

## 2021-06-14 ENCOUNTER — VIRTUAL VISIT (OUTPATIENT)
Dept: FAMILY MEDICINE | Facility: CLINIC | Age: 41
End: 2021-06-14
Payer: COMMERCIAL

## 2021-06-14 DIAGNOSIS — F32.A DEPRESSION, UNSPECIFIED DEPRESSION TYPE: ICD-10-CM

## 2021-06-14 DIAGNOSIS — R41.840 LACK OF CONCENTRATION: ICD-10-CM

## 2021-06-14 DIAGNOSIS — F41.9 ANXIETY: Primary | ICD-10-CM

## 2021-06-14 DIAGNOSIS — G25.0 ESSENTIAL TREMOR: ICD-10-CM

## 2021-06-14 PROCEDURE — 99214 OFFICE O/P EST MOD 30 MIN: CPT | Mod: 95 | Performed by: INTERNAL MEDICINE

## 2021-06-14 PROCEDURE — 96127 BRIEF EMOTIONAL/BEHAV ASSMT: CPT | Mod: 95 | Performed by: INTERNAL MEDICINE

## 2021-06-14 RX ORDER — BUPROPION HYDROCHLORIDE 150 MG/1
150 TABLET ORAL EVERY MORNING
Qty: 30 TABLET | Refills: 2 | Status: SHIPPED | OUTPATIENT
Start: 2021-06-14 | End: 2022-07-07

## 2021-06-14 ASSESSMENT — ANXIETY QUESTIONNAIRES
1. FEELING NERVOUS, ANXIOUS, OR ON EDGE: NEARLY EVERY DAY
7. FEELING AFRAID AS IF SOMETHING AWFUL MIGHT HAPPEN: SEVERAL DAYS
3. WORRYING TOO MUCH ABOUT DIFFERENT THINGS: NEARLY EVERY DAY
IF YOU CHECKED OFF ANY PROBLEMS ON THIS QUESTIONNAIRE, HOW DIFFICULT HAVE THESE PROBLEMS MADE IT FOR YOU TO DO YOUR WORK, TAKE CARE OF THINGS AT HOME, OR GET ALONG WITH OTHER PEOPLE: EXTREMELY DIFFICULT
5. BEING SO RESTLESS THAT IT IS HARD TO SIT STILL: NEARLY EVERY DAY
2. NOT BEING ABLE TO STOP OR CONTROL WORRYING: NEARLY EVERY DAY
6. BECOMING EASILY ANNOYED OR IRRITABLE: NEARLY EVERY DAY
GAD7 TOTAL SCORE: 19

## 2021-06-14 ASSESSMENT — PATIENT HEALTH QUESTIONNAIRE - PHQ9
5. POOR APPETITE OR OVEREATING: NEARLY EVERY DAY
SUM OF ALL RESPONSES TO PHQ QUESTIONS 1-9: 17

## 2021-06-14 NOTE — PATIENT INSTRUCTIONS
If no improvement at all after a month, let me know and we can increase the dose.  If you're noticing some improvement after a month, then give it two months and if after two months, symptoms are not sufficiently controlled, we can increase the dose  Patient Education     Essential Tremor (ET)  What is essential tremor?  Essential tremor (ET) is a neurological disorder. It causes your hands, head, trunk, voice, or legs to shake rhythmically. It is often confused with Parkinson disease.  ET is the most common trembling disorder that people have. Everyone has some ET. But the movements usually can't be seen or felt. When tremors are noticeable, the condition is classified as ET.  ET is most common among people older than age 65. But it can affect people at any age.   What causes ET?  ET can occur in different people for different reasons:    Familial essential tremor. In most people, the condition seems to be passed down from a parent to a child. If your parent has ET, there is a 50% chance that you or your children will inherit the gene responsible for the condition.    Essential tremor related to another disorder. Sometimes, a tremor is a symptom of another neurological disorder, such as Parkinson disease or dystonia. Sometimes, ET is mistaken for these other diseases when they are not present. A healthcare provider s careful diagnosis is extremely important.  The cause of ET isn t known. But one theory suggests that your cerebellum and other parts of your brain are not communicating the right way. The cerebellum is a part of the brain that controls muscle coordination.  What are the symptoms of ET?  If you have ET, you will have shaking and trembling at different times and in different situations. But some characteristics are common to all. Here is what you might typically experience:    Tremors occur when you move and are less noticeable when you rest.    Certain medicines, caffeine, or stress can make your  tremors worse.    Tremor may improve with ingestion of a small amount of alcohol (such as wine).    Tremors get worse as you age.    Tremors don t affect both sides of your body in the same way.  Here are different signs of ET:    Tremors that are most obvious in your hands    A hard time doing tasks with your hands, such as writing or using tools    Shaking or quivering sound in your voice    Uncontrollable head-nodding    In rare instances, tremors in your legs or feet  How is ET diagnosed?  Your rapid, uncontrollable trembling, as well as questions about your medical and family history, can help your healthcare provider determine if you have familial ET. He or she will probably need to rule out other conditions that could cause shaking or trembling. For example, tremors could be symptoms of diseases, such as hyperthyroidism. Your healthcare provider might test you for those, as well.  In some cases, the tremors might be related to other factors. To find out for certain, your healthcare provider may have you try to:    Abstain from heavy alcohol use (if you re an alcoholic, trembling is a common symptom).    Cut out cigarette smoking.    Stay away from caffeine.    Not take certain medicines.  How is ET treated?   Propanolol and primidone are 2 medicines often prescribed to treat ET. Propanolol blocks the stimulating action of neurotransmitters to calm your trembling. Primidone is a common antiseizure medicine that also controls the actions of neurotransmitters.  Gabapentin and topiramate are 2 other antiseizure medicines that are sometimes prescribed. In some cases, tranquilizers like alprazolam or clonazepam might be suggested.  For ET in your hands, botulinum toxin (Botox) injections have shown some promise in easing the trembling. They work by weakening the surrounding muscles around your hands. For severe tremors, a stimulating device (deep brain stimulator) surgically put in your brain may help.  What can I  do to help prevent ET?   The specific cause of ET is not known, so scientists are not sure how the condition can be prevented.  Living with ET  ET is usually not dangerous. But it can certainly be frustrating if you have to deal with it. Certain factors can make tremors worse. The following steps may help to decrease tremors:    Don't smoke.    Stay away from caffeine.    Limit alcohol. Small amounts of alcohol may improve the symptoms of ET, but the risk for alcoholism is a concern when people rely on it.    Stay away from stressful situations as much as possible    Use relaxation techniques, such as yoga, deep-breathing exercises, or biofeedback    Check with your healthcare provider to see if any medicines you re taking could be making your tremors worse.  Talk with your healthcare provider about other options, such as surgery, if ET starts to affect your quality of life.  When should I call my healthcare provider?  If you have been diagnosed with ET, talk with your healthcare provider about when you might need to call. He or she will likely advise you to call if your tremors become worse, or if you develop new neurologic symptoms, such as numbness or weakness.  Key points about essential tremors    ET is a neurological disorder. It causes your hands, head, trunk, voice, or legs to shake rhythmically. The cause is not known. But it is often passed down from a parent to a child.    ET is sometimes confused with other types of tremor, so getting the right diagnosis is important.    Tremors tend to be worse during movement than when at rest. The tremors are usually not dangerous. But they can get worse over time.    Staying away from things that might make tremors worse, such as stress, caffeine, and certain medicines, may be helpful.    Medicines can also help control or limit tremors in some people. Severe tremors can sometimes be treated with surgery.    Next steps  Tips to help you get the most from a visit to  your healthcare provider:    Know the reason for your visit and what you want to happen.    Before your visit, write down questions you want answered.    Bring someone with you to help you ask questions and remember what your provider tells you.    At the visit, write down the name of a new diagnosis, and any new medicines, treatments, or tests. Also write down any new instructions your provider gives you.    Know why a new medicine or treatment is prescribed, and how it will help you. Also know what the side effects are.    Ask if your condition can be treated in other ways.    Know why a test or procedure is recommended and what the results could mean.    Know what to expect if you do not take the medicine or have the test or procedure.    If you have a follow-up appointment, write down the date, time, and purpose for that visit.    Know how you can contact your provider if you have questions.  [employer] last reviewed this educational content on 3/1/2019      2958-3956 [employer]. All rights reserved. This information is not intended as a substitute for professional medical care. Always follow your healthcare professional's instructions.

## 2021-06-15 ASSESSMENT — ANXIETY QUESTIONNAIRES: GAD7 TOTAL SCORE: 19

## 2021-06-16 ENCOUNTER — E-VISIT (OUTPATIENT)
Dept: URGENT CARE | Facility: URGENT CARE | Age: 41
End: 2021-06-16
Payer: COMMERCIAL

## 2021-06-16 DIAGNOSIS — N39.0 ACUTE UTI (URINARY TRACT INFECTION): Primary | ICD-10-CM

## 2021-06-16 PROCEDURE — 99421 OL DIG E/M SVC 5-10 MIN: CPT | Performed by: PHYSICIAN ASSISTANT

## 2021-06-16 RX ORDER — NITROFURANTOIN 25; 75 MG/1; MG/1
100 CAPSULE ORAL 2 TIMES DAILY
Qty: 10 CAPSULE | Refills: 0 | Status: SHIPPED | OUTPATIENT
Start: 2021-06-16 | End: 2021-06-21

## 2021-06-16 NOTE — PATIENT INSTRUCTIONS
Dear Sharlene Natarajan    After reviewing your responses, I've been able to diagnose you with a urinary tract infection, which is a common infection of the bladder with bacteria.  This is not a sexually transmitted infection, though urinating immediately after intercourse can help prevent infections.  Drinking lots of fluids is also helpful to clear your current infection and prevent the next one.      I have sent a prescription for antibiotics to your pharmacy to treat this infection.    It is important that you take all of your prescribed medication even if your symptoms are improving after a few doses.  Taking all of your medicine helps prevent the symptoms from returning.     If your symptoms worsen, you develop pain in your back or stomach, develop fevers, or are not improving in 5 days, please contact your primary care provider for an appointment or visit any of our convenient Walk-in or Urgent Care Centers to be seen, which can be found on our website here.    Thanks again for choosing us as your health care partner,    Gonzalez Hunt PA-C

## 2021-07-09 ENCOUNTER — MYC MEDICAL ADVICE (OUTPATIENT)
Dept: OBGYN | Facility: CLINIC | Age: 41
End: 2021-07-09

## 2021-07-09 DIAGNOSIS — N92.4 EXCESSIVE BLEEDING IN THE PREMENOPAUSAL PERIOD: Primary | ICD-10-CM

## 2021-07-09 RX ORDER — TRANEXAMIC ACID 650 MG/1
1300 TABLET ORAL 2 TIMES DAILY
Qty: 12 TABLET | Refills: 0 | Status: SHIPPED | OUTPATIENT
Start: 2021-07-09 | End: 2021-07-12

## 2021-07-09 NOTE — TELEPHONE ENCOUNTER
Offer pt some medication called Lysteda 1300mg po BID x 3 days; this can help lighten the flow; we will further evaluate at her upcoming visit   Anna Saldaña MD   [facility]

## 2021-07-09 NOTE — TELEPHONE ENCOUNTER
"Patient reports that she had her cycle from June 17- 25 which she reported was a heavy long flow. Reported to day that her cycle started again two days ago, July 7th.     Reports she has been using super tampons and having clots around a quarter or little smaller. Yesterday having to change your tampon every 3-4 hours, today reports needing to change them every couple of hours. Has mild cramping but states \"not horrible\". Has been drinking fluids and otherwise feels fine.    Denies: dizziness, lightheadedness, feeling faint, off balance, headaches, nausea/vomiting or short of breath.      Wondering if provider has any recommendations for her until seen by provider in clinic.     Sharlene Abebe RN on 7/9/2021 at 3:13 PM   "

## 2021-08-03 ENCOUNTER — PATIENT OUTREACH (OUTPATIENT)
Dept: FAMILY MEDICINE | Facility: CLINIC | Age: 41
End: 2021-08-03

## 2021-08-13 ENCOUNTER — OFFICE VISIT (OUTPATIENT)
Dept: OBGYN | Facility: CLINIC | Age: 41
End: 2021-08-13
Payer: COMMERCIAL

## 2021-08-13 VITALS
SYSTOLIC BLOOD PRESSURE: 153 MMHG | WEIGHT: 182 LBS | BODY MASS INDEX: 29.25 KG/M2 | DIASTOLIC BLOOD PRESSURE: 93 MMHG | HEART RATE: 100 BPM | HEIGHT: 66 IN | RESPIRATION RATE: 18 BRPM | TEMPERATURE: 98.5 F

## 2021-08-13 DIAGNOSIS — N92.1 MENORRHAGIA WITH IRREGULAR CYCLE: Primary | ICD-10-CM

## 2021-08-13 DIAGNOSIS — Z12.4 CERVICAL CANCER SCREENING: ICD-10-CM

## 2021-08-13 LAB
ERYTHROCYTE [DISTWIDTH] IN BLOOD BY AUTOMATED COUNT: 12.2 % (ref 10–15)
FERRITIN SERPL-MCNC: 32 NG/ML (ref 12–150)
FSH SERPL-ACNC: 7.6 IU/L
HCT VFR BLD AUTO: 37.1 % (ref 35–47)
HGB BLD-MCNC: 12.4 G/DL (ref 11.7–15.7)
MCH RBC QN AUTO: 29.1 PG (ref 26.5–33)
MCHC RBC AUTO-ENTMCNC: 33.4 G/DL (ref 31.5–36.5)
MCV RBC AUTO: 87 FL (ref 78–100)
PLATELET # BLD AUTO: 326 10E3/UL (ref 150–450)
RBC # BLD AUTO: 4.26 10E6/UL (ref 3.8–5.2)
TSH SERPL DL<=0.005 MIU/L-ACNC: 1.57 MU/L (ref 0.4–4)
WBC # BLD AUTO: 10 10E3/UL (ref 4–11)

## 2021-08-13 PROCEDURE — 36415 COLL VENOUS BLD VENIPUNCTURE: CPT | Performed by: OBSTETRICS & GYNECOLOGY

## 2021-08-13 PROCEDURE — 83001 ASSAY OF GONADOTROPIN (FSH): CPT | Performed by: OBSTETRICS & GYNECOLOGY

## 2021-08-13 PROCEDURE — 85027 COMPLETE CBC AUTOMATED: CPT | Performed by: OBSTETRICS & GYNECOLOGY

## 2021-08-13 PROCEDURE — 84443 ASSAY THYROID STIM HORMONE: CPT | Performed by: OBSTETRICS & GYNECOLOGY

## 2021-08-13 PROCEDURE — 82306 VITAMIN D 25 HYDROXY: CPT | Performed by: OBSTETRICS & GYNECOLOGY

## 2021-08-13 PROCEDURE — 87624 HPV HI-RISK TYP POOLED RSLT: CPT | Performed by: OBSTETRICS & GYNECOLOGY

## 2021-08-13 PROCEDURE — 88175 CYTOPATH C/V AUTO FLUID REDO: CPT | Performed by: OBSTETRICS & GYNECOLOGY

## 2021-08-13 PROCEDURE — 82728 ASSAY OF FERRITIN: CPT | Performed by: OBSTETRICS & GYNECOLOGY

## 2021-08-13 PROCEDURE — 99214 OFFICE O/P EST MOD 30 MIN: CPT | Performed by: OBSTETRICS & GYNECOLOGY

## 2021-08-13 ASSESSMENT — MIFFLIN-ST. JEOR: SCORE: 1507.3

## 2021-08-13 NOTE — PATIENT INSTRUCTIONS
Suggestions:  Read information about Mirena IUD and endometrial ablation  Look at your diet, and see where healthy modifications can be done (such as decreasing carbs, increasing fresh foods)  Exercise is your friend!!!

## 2021-08-13 NOTE — PROGRESS NOTES
Sharlene is a 41 year old   female who presents for concern about significant change in periods since 3/21; previously periods were monthly, lasting 4-5 days, 2 days heavy, associated with PMS  Since 3/21, periods have become irregular, sometimes protracted, and consistently heavy; in , she had 2 heavy periods, 2 weeks ago  She has also become forgetful, gained central weight, having night sweats  Both her mother and older sister had early menopause.  She is not on BCM, no plans to pursue this at this time.    Patient Active Problem List    Diagnosis Date Noted     Essential hypertension, benign 10/01/2018     Priority: Medium     Depression with anxiety 2018     Priority: Medium     Depression 2017     Priority: Medium     Started on Cymbalta 60mg--side effects; lowered after 1 week to 30mg QD       WINDY III with severe dysplasia 2017     Priority: Medium     17 NIL pap, + HR HPV 16. Plan colp due by 17 Rousseau Bx - WINDY 2 & 3, ECC - Negative. Plan LEEP  18: LEEP: WINDY 3; margins clear--cotesting 1 year  3/22/19 Pap: NIL, neg HR HPV. Plan cotest in 1 year.  20 NIL pap, Neg HPV. Plan cotest in 1 year due by 21.  21 appt; Reminder MyChart         CARDIOVASCULAR SCREENING; LDL GOAL LESS THAN 130 12/10/2012     Priority: Medium     Female infertility 2010     Priority: Medium     Cycle #1: Clomid 50mg day 3-7--Prog 10.8  Cycle #2: Clomid 50mg day 3-7; HCG trigger, IUI, Prometrium starting cycle day 22; day 21 prog=2.3  Cycle #3: Clomid 100mg day 3-7, day 10 sono showed borderline hyperstimulation; no trigger or IUI done; consider change to Letrazole next cycle or referral to [facility]; day 21 prog low  Cycle 4: Letrazole 2.5mg day 3-7  Referred to [facility]--4 cycles with IUI; with only 1 chemical pregnancy         All systems were reviewed and pertinent information in noted in subjective/HPI.    Past Medical History:   Diagnosis Date     Anxiety      Cervical  high risk HPV (human papillomavirus) test positive 2017    type 16     Chickenpox      Chlamydia 2000     Depression      Infertility      Mild preeclampsia        Past Surgical History:   Procedure Laterality Date      SECTION  2011    Procedure: SECTION;  Section; Surgeon:ABELINO CABAN; Location:[facility] OR      SECTION  2014    Procedure:  SECTION;   Section;  Surgeon: Abelino Caban MD;  Location: [facility] OR         Current Outpatient Medications:      buPROPion (WELLBUTRIN XL) 150 MG 24 hr tablet, Take 1 tablet (150 mg) by mouth every morning, Disp: 30 tablet, Rfl: 2     Probiotic Product (PROBIOTIC + OMEGA-3 PO), One day, Disp: , Rfl:     ALLERGIES:  Pcn [penicillins]    Social History     Socioeconomic History     Marital status:      Spouse name: Alexandro Natarajan     Number of children: 2     Years of education: 16+     Highest education level: None   Occupational History     Occupation: Instructor/     Employer: [employer]   Tobacco Use     Smoking status: Never Smoker     Smokeless tobacco: Never Used   Vaping Use     Vaping Use: Never used   Substance and Sexual Activity     Alcohol use: No     Alcohol/week: 0.0 standard drinks     Drug use: No     Sexual activity: Yes     Partners: Male     Birth control/protection: None     Comment: none   Other Topics Concern     Parent/sibling w/ CABG, MI or angioplasty before 65F 55M? No   Social History Narrative     None     Social Determinants of Health     Financial Resource Strain:      Difficulty of Paying Living Expenses:    Food Insecurity:      Worried About Running Out of Food in the Last Year:      Ran Out of Food in the Last Year:    Transportation Needs:      Lack of Transportation (Medical):      Lack of Transportation (Non-Medical):    Physical Activity:      Days of Exercise per Week:      Minutes of Exercise per Session:    Stress:      Feeling of  "Stress :    Social Connections:      Frequency of Communication with Friends and Family:      Frequency of Social Gatherings with Friends and Family:      Attends Christian Services:      Active Member of Clubs or Organizations:      Attends Club or Organization Meetings:      Marital Status:    Intimate Partner Violence:      Fear of Current or Ex-Partner:      Emotionally Abused:      Physically Abused:      Sexually Abused:        Family History   Problem Relation Age of Onset     Cancer Mother         cervical     Heart Disease Mother         MVP     Thyroid Disease Mother      Gastrointestinal Disease Mother         IBS     Diabetes Mother      Hypertension Mother      Myocardial Infarction Mother      Psychotic Disorder Father         bipolar     Depression Father      Diabetes Maternal Grandfather      Prostate Cancer Paternal Grandfather      Heart Disease Paternal Grandfather         pacemaker     Allergies Sister      Depression Sister      Osteoporosis Paternal Grandmother        OBJECTIVE:  Vitals: BP (!) 153/93 (BP Location: Right arm, Patient Position: Chair, Cuff Size: Adult Regular)   Pulse 100   Temp 98.5  F (36.9  C) (Tympanic)   Resp 18   Ht 1.676 m (5' 6\")   Wt 82.6 kg (182 lb)   LMP 08/02/2021   Breastfeeding No   BMI 29.38 kg/m   BMI= Body mass index is 29.38 kg/m .   Patient's last menstrual period was 08/02/2021.     GENERAL APPEARANCE: healthy, alert and no distress  ABDOMEN:  soft, nontender, no hepato-splenomegaly or hernias   PELVIC:  EGBUS normal, vagina well estrogenized and supported, no unusual discharge, cervix grossly normal, PAP taken, uterus normal in size and configura    ASSESSMENT:      ICD-10-CM    1. Menorrhagia with irregular cycle  N92.1 TSH with free T4 reflex     Follicle stimulating hormone     CBC with platelets     Ferritin     Vitamin D Deficiency   2. Cervical cancer screening  Z12.4 Pap thin layer diagnostic with HPV       PLAN:  I discussed with Sharlene " how the process of moving into menopause, can be messy and associated with a lot of menstrual issues.  We discussed checking TSH, FSH, CBC, Ferritin and Vit D levels  I also discussed BCP, Mirena IUD and endometrtial ablation (?with BILATERAL TUBAL STERILIZATION)  Written info was given for her review  Anna Saldaña MD  [facility]      Anna Saldaña MD

## 2021-08-16 LAB — DEPRECATED CALCIDIOL+CALCIFEROL SERPL-MC: 26 UG/L (ref 20–75)

## 2021-08-18 LAB
BKR LAB AP GYN ADEQUACY: NORMAL
BKR LAB AP GYN INTERPRETATION: NORMAL
BKR LAB AP HPV REFLEX: NORMAL
BKR LAB AP LMP: NORMAL
BKR LAB AP PREVIOUS ABNL DX: NORMAL
BKR LAB AP PREVIOUS ABNORMAL: NORMAL
PATH REPORT.COMMENTS IMP SPEC: NORMAL
PATH REPORT.RELEVANT HX SPEC: NORMAL

## 2021-08-19 LAB
HUMAN PAPILLOMA VIRUS 16 DNA: NEGATIVE
HUMAN PAPILLOMA VIRUS 18 DNA: NEGATIVE
HUMAN PAPILLOMA VIRUS FINAL DIAGNOSIS: NORMAL
HUMAN PAPILLOMA VIRUS OTHER HR: NEGATIVE

## 2021-08-20 ENCOUNTER — PATIENT OUTREACH (OUTPATIENT)
Dept: OBGYN | Facility: CLINIC | Age: 41
End: 2021-08-20

## 2021-09-26 ENCOUNTER — HEALTH MAINTENANCE LETTER (OUTPATIENT)
Age: 41
End: 2021-09-26

## 2021-10-10 ENCOUNTER — NURSE TRIAGE (OUTPATIENT)
Dept: NURSING | Facility: CLINIC | Age: 41
End: 2021-10-10

## 2021-10-10 DIAGNOSIS — G43.B1 INTRACTABLE OPHTHALMOPLEGIC MIGRAINE: Primary | ICD-10-CM

## 2021-10-10 DIAGNOSIS — G43.B1 INTRACTABLE OPHTHALMOPLEGIC MIGRAINE: ICD-10-CM

## 2021-10-10 RX ORDER — SUMATRIPTAN 50 MG/1
50 TABLET, FILM COATED ORAL
Qty: 4 TABLET | Refills: 0
Start: 2021-10-10 | End: 2021-10-10

## 2021-10-10 RX ORDER — SUMATRIPTAN 50 MG/1
50 TABLET, FILM COATED ORAL
Qty: 4 TABLET | Refills: 0 | Status: SHIPPED | OUTPATIENT
Start: 2021-10-10 | End: 2023-06-05

## 2021-10-10 NOTE — TELEPHONE ENCOUNTER
TELEPHONE CALL -  Pt calling with Concern about medication:   SUMAtriptan (IMITREX) 50 MG tablet   Reason for call prescription is NOT at this pharmacy -     [facility] at [address] [place] [address]       Note from FAN   Reads:  readback Jessica Monreal, RN From Genie Armijo MD        Prescribing Provider's NPI: [id number]  Genie Armijo.      Prescription was not sent e-prescribed - This RN resend it -  Called pharmacy and comfirmed that Rx was received electronically     Called back Pt and let her know    No symptoms reported, no reason to TRIAGE patient for this nurse.  Patient s questions answered  Reminded we will be here 24/7 for any other questions or concerns.  Writer encouraged to continue to monitor for symptoms and call us back as necessary  Estee Montiel RN Nurse Triage Advisor 11:48 AM 10/10/2021

## 2021-10-10 NOTE — TELEPHONE ENCOUNTER
Pt complaining of Occular migraine and took Excedrin Migraine     Headache 7/10    Had a past script for Imitrex - does not remember if she had used it or not.     Uses [facility] pharmacy   [phone]    On call paged Dr. Duque @ 6:56am    Dr. Duque ordered: RX for Imitrex 50mg Take 1 tablet and repeat in 2 hours if no relief - Maximum of 4 tablets within 24 hours - total 4 tabs, no refills: To [facility] in [place]     Jessica Monreal RN  [facility] Nurse Advisor  7:18 AM 10/10/2021     COVID 19 Nurse Triage Plan/Patient Instructions    Please be aware that novel coronavirus (COVID-19) may be circulating in the community. If you develop symptoms such as fever, cough, or SOB or if you have concerns about the presence of another infection including coronavirus (COVID-19), please contact your health care provider or visit [email or web address]     Disposition/Instructions    Home care recommended. Follow home care protocol based instructions.    Thank you for taking steps to prevent the spread of this virus.  o Limit your contact with others.  o Wear a simple mask to cover your cough.  o Wash your hands well and often.    Resources    [facility]: About COVID-19: [email or web address]    CDC: What to Do If You're Sick: www.cdc.gov/coronavirus/2019-ncov/about/steps-when-sick.html    CDC: Ending Home Isolation: www.cdc.gov/coronavirus/2019-ncov/hcp/disposition-in-home-patients.html     CDC: Caring for Someone: www.cdc.gov/coronavirus/2019-ncov/if-you-are-sick/care-for-someone.html     [facility]: Interim Guidance for Hospital Discharge to Home: www.[email or web address].[email or web address]/diseases/coronavirus/hcp/hospdischarge.pdf    [facility] clinical trials (COVID-19 research studies): [email or web address]     Below are the COVID-19 hotlines at the [facility] ([facility]). Interpreters are available.   o For health questions: Call 396-173-9752 or  "1-465.695.3720 (7 a.m. to 7 p.m.)  o For questions about schools and childcare: Call 050-698-6572 or 1-974.123.5618 (7 a.m. to 7 p.m.)       Additional Information    Negative: Difficult to awaken or acting confused (e.g., disoriented, slurred speech)    Negative: [1] Weakness of the face, arm or leg on one side of the body AND [2] new onset    Negative: [1] Numbness of the face, arm or leg on one side of the body AND [2] new onset    Negative: [1] Loss of speech or garbled speech AND [2] new onset    Negative: Passed out (i.e., lost consciousness, collapsed and was not responding)    Negative: Sounds like a life-threatening emergency to the triager    Negative: Followed a head injury    Negative: Pregnant    Negative: Postpartum (from 0 to 6 weeks after delivery)    Negative: Traumatic Brain Injury (TBI) is suspected    Negative: Unable to walk, or can only walk with assistance (e.g., requires support)    Negative: Stiff neck (can't touch chin to chest)    Negative: Severe pain in one eye    Negative: [1] Other family members (or roommates) with headaches AND [2] possibility of carbon monoxide exposure    Negative: [1] SEVERE headache (e.g., excruciating) AND [2] \"worst headache\" of life    Negative: [1] SEVERE headache AND [2] sudden-onset (i.e., reaching maximum intensity within seconds)    Negative: [1] SEVERE headache AND [2] fever    Negative: Loss of vision or double vision (Exception: same as prior migraines)    Negative: [1] Fever > 100.0 F (37.8 C) AND [2] diabetes mellitus or weak immune system (e.g., HIV positive, cancer chemo, splenectomy, organ transplant, chronic steroids)    Negative: Patient sounds very sick or weak to the triager    [1] SEVERE headache (e.g., excruciating) AND [2] not improved after 2 hours of pain medicine    Protocols used: HEADACHE-A-AH      "

## 2021-12-02 NOTE — TELEPHONE ENCOUNTER
"Pt states Dr Garcia @ [facility] sent Rx today to pharmacy for guaifenesin w/ codeine 100-10 mg/5mL syrup for cough. When she got to [place] pharmacy they were closed. Pt asked [facility] pharmacy to fill but they said they cannot as med is controlled.   Spoke w/ [facility] pharmacy and asked them to fill per order in EMR. [facility] pharmacy will fill Rx now. Pt informed.     Reason for Disposition    Caller has medication question, adult has minor symptoms, caller declines triage, and triager answers question    Additional Information    Negative: Drug overdose and nurse unable to answer question    Negative: Caller requesting information not related to medicine    Negative: Caller requesting a prescription for Strep throat and has a positive culture result    Negative: Rash while taking a medication or within 3 days of stopping it    Negative: Immunization reaction suspected    Negative: [1] Asthma and [2] having symptoms of asthma (cough, wheezing, etc)    Negative: MORE THAN A DOUBLE DOSE of a prescription or over-the-counter (OTC) drug    Negative: [1] DOUBLE DOSE (an extra dose or lesser amount) of over-the-counter (OTC) drug AND [2] any symptoms (e.g., dizziness, nausea, pain, sleepiness)    Negative: [1] DOUBLE DOSE (an extra dose or lesser amount) of prescription drug AND [2] any symptoms (e.g., dizziness, nausea, pain, sleepiness)    Negative: Took another person's prescription drug    Negative: [1] DOUBLE DOSE (an extra dose or lesser amount) of prescription drug AND [2] NO symptoms (Exception: a double dose of antibiotics)    Negative: Diabetes drug error or overdose (e.g., insulin or extra dose)    Negative: [1] Request for URGENT new prescription or refill of \"essential\" medication (i.e., likelihood of harm to patient if not taken) AND [2] triager unable to fill per unit policy    Negative: [1] Prescription not at pharmacy AND [2] was prescribed today by PCP    Negative: Pharmacy calling with prescription questions " Physical Therapy  Visit Type: treatment  Precautions:  Medical precautions:  fall risk; standard precautions. The patient is a 68year old female who was [facility]/2021 withLEFT TOTAL HIP ARTHROPLASTY - LEFT . Patient seen on 3 nursing unit. Lines:     Basic: telemetry  Lower Extremity:    Left:  weight bearing: as tolerated. Safety Measures: bed alarm, bed rails and chair alarm    SUBJECTIVE  Patient agreed to participate in therapy this date. Reported that she will not need to use interior stairs initially returning home. Patient / Family Goal: maximize function and return home    Pain     Location: L hip, ice applied    At onset of session (out of 10): 4     OBJECTIVE     Oriented to person, place, time and situation     Affect/Behavior: alert, appropriate, calm and cooperative    Bed Mobility:        Supine to sit: stand by assist  Training completed:    Tasks: supine to sit  Transfers:    Assistive devices: gait belt, 2-wheeled walker and 1 person    Sit to stand: contact guard/touching/steadying assist    Stand to sit: contact guard/touching/steadying assist  Training completed:    Tasks: sit to stand and stand to sit    Discussed safe car transfers and home safety; Pt expressed understanding. Gait/Ambulation:     Assistance: contact guard/touching/steadying assist   Assistive device: gait belt, 2-wheeled walker and 1 person    Distance (ft): 100    Pattern: step to and step through    Type: decreased killian    Swing phase: Left: decreased step length; Right: decreased step length  Training Completed:    Tasks: gait training on level surfaces    Reviewed step to and step through gait patterns and able to progress to step through.   Gait limited to in room only per pt request.  Stair Mobility:    Number of steps: 2;     Assistance: contact guard/touching/steadying assist    Rails: no rails    Pattern: step to and forward    Devices used: gait belt and walker  Training and triager unable to answer question    Negative: Caller has URGENT medication question about med that PCP prescribed and triager unable to answer question    Negative: Caller has NON-URGENT medication question about med that PCP prescribed and triager unable to answer question    Negative: Caller requesting a NON-URGENT new prescription or refill and triager unable to refill per unit policy    Negative: [1] DOUBLE DOSE (an extra dose or lesser amount) of over-the-counter (OTC) drug AND [2] NO symptoms    Negative: [1] DOUBLE DOSE (an extra dose or lesser amount) of antibiotic drug AND [2] NO symptoms    Negative: Caller has medication question about med not prescribed by PCP and triager unable to answer question (e.g., compatibility with other med, storage)    Negative: Caller has medication question only, adult not sick, and triager answers question    Protocols used: MEDICATION QUESTION CALL-A-       completed:    Tasks: curb training and assistive device use    Education details: patient demonstrates understanding      Interventions     Standing    Lower Extremity: Left and bilateral: marching and mini squats, AROM, 10 reps, 1 sets    Rehab Safety  Therapist donned the following PPE for this patient encounter:  Gloves and Surgical Mask    Therapy aide or other healthcare professional present during this patient encounter:   No  If yes, that healthcare professional wore the following PPE: Not Applicable    The patient was wearing a mask during this session:  Yes    Therapist with patient for approx 55 minutes. Skilled input: Verbal instruction/cues and tactile instruction/cues  Verbal Consent: Writer verbally educated and received verbal consent for hand placement, positioning of patient, and techniques to be performed today from patient        ASSESSMENT    Impairments: pain, strength, balance deficits and activity tolerance  Functional Limitations: ambulation, sit to/from stand transfers and stair climbing  Today's treatment focused on transfers, gait, curb step training. The patient is demonstrating good progress as evidenced by good ability to perform tasks for safe return home w/ assist.           Discharge Recommendations    Recommendation for Discharge: PT IL: Patient is appropriate for Physical Therapy 1-3 times per week,Patient requires  intermittent assistance to perform mobility and/or ADLs safely           PT/OT Mobility Equipment for Discharge: RW       Skilled therapy is required to address these limitations in attempt to maximize the patient's independence.   Progress: progressing toward goals    End of Session:   Location: in chair  Safety measures: alarm system in place/re-engaged, call light within reach and lines intact  Handoff to: nurse    PLAN    Suggestions for next session as indicated: Transfers, gait    PT Frequency: Twice a day       Interventions: bed mobility, functional transfer training, gait training, safety education and stairs retraining  Agreement to plan and goals: patient agrees with goals and treatment plan        GOALS  Review Date: 12/22/2021  Long Term Goals: (to be met by time of discharge from hospital)  State precautions: Patient able to state precautions independent. Status: progressing/ongoing  Maintain precautions: Patient able to maintain precautions independent. Status: progressing/ongoing  Sit to supine: Patient will complete sit to supine independent. Status: progressing/ongoing  Supine to sit: Patient will complete supine to sit independent. Status: progressing/ongoing  Sit (edge of bed): Patient will sit at edge of bed for independent. Status: progressing/ongoing  Stand (even surface): Patient will stand on even surface for independent. Status: progressing/ongoing  Stand (uneven surface): Patient will stand on uneven surface for independent. Status: progressing/ongoing  Sit to stand: Patient will complete sit to stand transfer with 2-wheeled walker, modified independent. Status: progressing/ongoing  Ambulation (even): Patient will ambulate on even surface for 200 feet with 2-wheeled walker, modified independent. Status: progressing/ongoing  Flight of stairs: Patient will ambulate a flight of stairs with gait belt and 2-wheeled walker modified independent. Status: progressing/ongoing    Documented in the chart in the following areas: Assessment.       Therapy procedure time and total treatment time can be found documented on the Time Entry flowsheet

## 2022-04-17 ENCOUNTER — E-VISIT (OUTPATIENT)
Dept: URGENT CARE | Facility: CLINIC | Age: 42
End: 2022-04-17
Payer: COMMERCIAL

## 2022-04-17 DIAGNOSIS — T36.95XA ANTIBIOTIC-INDUCED YEAST INFECTION: ICD-10-CM

## 2022-04-17 DIAGNOSIS — N39.0 ACUTE UTI (URINARY TRACT INFECTION): Primary | ICD-10-CM

## 2022-04-17 DIAGNOSIS — B37.9 ANTIBIOTIC-INDUCED YEAST INFECTION: ICD-10-CM

## 2022-04-17 PROCEDURE — 99421 OL DIG E/M SVC 5-10 MIN: CPT | Performed by: INTERNAL MEDICINE

## 2022-04-17 RX ORDER — NITROFURANTOIN 25; 75 MG/1; MG/1
100 CAPSULE ORAL 2 TIMES DAILY
Qty: 10 CAPSULE | Refills: 0 | Status: SHIPPED | OUTPATIENT
Start: 2022-04-17 | End: 2022-04-22

## 2022-04-17 RX ORDER — FLUCONAZOLE 150 MG/1
150 TABLET ORAL ONCE
Qty: 1 TABLET | Refills: 0 | Status: SHIPPED | OUTPATIENT
Start: 2022-04-17 | End: 2022-04-17

## 2022-04-17 NOTE — PATIENT INSTRUCTIONS
Dear Sharlene Natarajan    After reviewing your responses, I've been able to diagnose you with a urinary tract infection, which is a common infection of the bladder with bacteria.  This is not a sexually transmitted infection, though urinating immediately after intercourse can help prevent infections.  Drinking lots of fluids is also helpful to clear your current infection and prevent the next one.      I have sent a prescription for antibiotics to your pharmacy to treat this infection.    It is important that you take all of your prescribed medication even if your symptoms are improving after a few doses.  Taking all of your medicine helps prevent the symptoms from returning.     If your symptoms worsen, you develop pain in your back or stomach, develop fevers, or are not improving in 5 days, please contact your primary care provider for an appointment or visit any of our convenient Walk-in or Urgent Care Centers to be seen, which can be found on our website here.    Thanks again for choosing us as your health care partner,    Raven Tyson MD    Urinary Tract Infections in Women  Urinary tract infections (UTIs) are most often caused by bacteria. These bacteria enter the urinary tract. The bacteria may come from inside the body. Or they may travel from the skin outside the rectum or vagina into the urethra. Female anatomy makes it easy for bacteria from the bowel to enter a woman s urinary tract, which is the most common source of UTI. This means women develop UTIs more often than men. Pain in or around the urinary tract is a common UTI symptom. But the only way to know for sure if you have a UTI for the healthcare provider to test your urine. The two tests that may be done are the urinalysis and urine culture.     Types of UTIs    Cystitis. A bladder infection (cystitis) is the most common UTI in women. You may have urgent or frequent need to pee. You may also have pain, burning when you pee, and  bloody urine.    Urethritis. This is an inflamed urethra, which is the tube that carries urine from the bladder to outside the body. You may have lower stomach or back pain. You may also have urgent or frequent need to pee.    Pyelonephritis. This is a kidney infection. If not treated, it can be serious and damage your kidneys. In severe cases, you may need to stay in the hospital. You may have a fever and lower back pain.    Medicines to treat a UTI  Most UTIs are treated with antibiotics. These kill the bacteria. The length of time you need to take them depends on the type of infection. It may be as short as 3 days. If you have repeated UTIs, you may need a low-dose antibiotic for several months. Take antibiotics exactly as directed. Don t stop taking them until all of the medicine is gone. If you stop taking the antibiotic too soon, the infection may not go away. You may also develop a resistance to the antibiotic. This can make it much harder to treat.   Lifestyle changes to treat and prevent UTIs   The lifestyle changes below will help get rid of your UTI. They may also help prevent future UTIs.     Drink plenty of fluids. This includes water, juice, or other caffeine-free drinks. Fluids help flush bacteria out of your body.    Empty your bladder. Always empty your bladder when you feel the urge to pee. And always pee before going to sleep. Urine that stays in your bladder can lead to infection. Try to pee before and after sex as well.    Practice good personal hygiene. Wipe yourself from front to back after using the toilet. This helps keep bacteria from getting into the urethra.    Use condoms during sex. These help prevent UTIs caused by sexually transmitted bacteria. Also don't use spermicides during sex. These can increase the risk for UTIs. Choose other forms of birth control instead. For women who tend to get UTIs after sex, a low-dose of a preventive antibiotic may be used. Be sure to discuss this  option with your healthcare provider.    Follow up with your healthcare provider as directed. He or she may test to make sure the infection has cleared. If needed, more treatment may be started.  [employer] last reviewed this educational content on 7/1/2019 2000-2021 [employer]. All rights reserved. This information is not intended as a substitute for professional medical care. Always follow your healthcare professional's instructions.

## 2022-07-07 ENCOUNTER — VIRTUAL VISIT (OUTPATIENT)
Dept: FAMILY MEDICINE | Facility: CLINIC | Age: 42
End: 2022-07-07
Payer: COMMERCIAL

## 2022-07-07 DIAGNOSIS — R41.840 LACK OF CONCENTRATION: ICD-10-CM

## 2022-07-07 DIAGNOSIS — F41.9 ANXIETY: Primary | ICD-10-CM

## 2022-07-07 DIAGNOSIS — F32.A DEPRESSION, UNSPECIFIED DEPRESSION TYPE: ICD-10-CM

## 2022-07-07 PROCEDURE — 99213 OFFICE O/P EST LOW 20 MIN: CPT | Mod: GT | Performed by: PHYSICIAN ASSISTANT

## 2022-07-07 RX ORDER — BUPROPION HYDROCHLORIDE 150 MG/1
150 TABLET ORAL EVERY MORNING
Qty: 60 TABLET | Refills: 2 | Status: SHIPPED | OUTPATIENT
Start: 2022-07-07 | End: 2023-06-05

## 2022-07-07 ASSESSMENT — ANXIETY QUESTIONNAIRES
5. BEING SO RESTLESS THAT IT IS HARD TO SIT STILL: NEARLY EVERY DAY
GAD7 TOTAL SCORE: 17
7. FEELING AFRAID AS IF SOMETHING AWFUL MIGHT HAPPEN: NOT AT ALL
1. FEELING NERVOUS, ANXIOUS, OR ON EDGE: NEARLY EVERY DAY
6. BECOMING EASILY ANNOYED OR IRRITABLE: MORE THAN HALF THE DAYS
GAD7 TOTAL SCORE: 17
8. IF YOU CHECKED OFF ANY PROBLEMS, HOW DIFFICULT HAVE THESE MADE IT FOR YOU TO DO YOUR WORK, TAKE CARE OF THINGS AT HOME, OR GET ALONG WITH OTHER PEOPLE?: EXTREMELY DIFFICULT
GAD7 TOTAL SCORE: 17
7. FEELING AFRAID AS IF SOMETHING AWFUL MIGHT HAPPEN: NOT AT ALL
2. NOT BEING ABLE TO STOP OR CONTROL WORRYING: NEARLY EVERY DAY
4. TROUBLE RELAXING: NEARLY EVERY DAY
3. WORRYING TOO MUCH ABOUT DIFFERENT THINGS: NEARLY EVERY DAY

## 2022-07-07 ASSESSMENT — PATIENT HEALTH QUESTIONNAIRE - PHQ9
SUM OF ALL RESPONSES TO PHQ QUESTIONS 1-9: 12
SUM OF ALL RESPONSES TO PHQ QUESTIONS 1-9: 12
10. IF YOU CHECKED OFF ANY PROBLEMS, HOW DIFFICULT HAVE THESE PROBLEMS MADE IT FOR YOU TO DO YOUR WORK, TAKE CARE OF THINGS AT HOME, OR GET ALONG WITH OTHER PEOPLE: EXTREMELY DIFFICULT

## 2022-07-07 NOTE — PROGRESS NOTES
"Sharlene is a 42 year old who is being evaluated via a billable video visit.      How would you like to obtain your AVS? MyChart  If the video visit is dropped, the invitation should be resent by: Text to cell phone: [phone]  Will anyone else be joining your video visit? No      Assessment & Plan     Anxiety  - buPROPion (WELLBUTRIN XL) 150 MG 24 hr tablet; Take 1 tablet (150 mg) by mouth every morning for 2 weeks then increase to 300 mg daily.    Depression, unspecified depression type  - buPROPion (WELLBUTRIN XL) 150 MG 24 hr tablet; Take 1 tablet (150 mg) by mouth every morning for 2 weeks then increase to 300 mg daily.    Lack of concentration  - buPROPion (WELLBUTRIN XL) 150 MG 24 hr tablet; Take 1 tablet (150 mg) by mouth every morning for 2 weeks then increase to 300 mg daily.             BMI:   Estimated body mass index is 29.38 kg/m  as calculated from the following:    Height as of 8/13/21: 1.676 m (5' 6\").    Weight as of 8/13/21: 82.6 kg (182 lb).       Depression Screening Follow Up    PHQ 7/7/2022   PHQ-9 Total Score 12   Q9: Thoughts of better off dead/self-harm past 2 weeks Not at all         Follow Up Actions Taken  Crisis resource information provided in After Visit Summary         Return in about 1 month (around 8/7/2022) for Recheck if not improving.    Marylin Iqbal PA-C  [facility]    Marilyn Su is a 42 year old, presenting for the following health issues:   Follow Up      History of Present Illness       Mental Health Follow-up:  Patient presents to follow-up on Depression & Anxiety.Patient's depression since last visit has been:  Worse  The patient is having other symptoms associated with depression.  Patient's anxiety since last visit has been:  Worse  The patient is having other symptoms associated with anxiety.  Any significant life events: No  Patient is feeling anxious or having panic attacks.  Patient has no concerns about " alcohol or drug use.    She eats 2-3 servings of fruits and vegetables daily.She consumes 2 sweetened beverage(s) daily.She exercises with enough effort to increase her heart rate 9 or less minutes per day.  She exercises with enough effort to increase her heart rate 3 or less days per week. She is missing 7 dose(s) of medications per week.  She is not taking prescribed medications regularly due to other.    Today's PHQ-9         PHQ-9 Total Score: 12    PHQ-9 Q9 Thoughts of better off dead/self-harm past 2 weeks :   Not at all    How difficult have these problems made it for you to do your work, take care of things at home, or get along with other people: Extremely difficult  Today's MARLENE-7 Score: 17        Depression and Anxiety Follow-Up    How are you doing with your depression since your last visit? Worsened stopped taking medication    How are you doing with your anxiety since your last visit?  Worsened stopped medication in november    Are you having other symptoms that might be associated with depression or anxiety? Yes:  brain fog, cant stay focused, irritable    Have you had a significant life event? No     Do you have any concerns with your use of alcohol or other drugs? No    Social History     Tobacco Use     Smoking status: Never Smoker     Smokeless tobacco: Never Used   Vaping Use     Vaping Use: Never used   Substance Use Topics     Alcohol use: No     Alcohol/week: 0.0 standard drinks     Drug use: No     PHQ 8/14/2020 6/14/2021 7/7/2022   PHQ-9 Total Score 17 17 12   Q9: Thoughts of better off dead/self-harm past 2 weeks Not at all Not at all Not at all     MARLENE-7 SCORE 8/14/2020 6/14/2021 7/7/2022   Total Score - - -   Total Score - - 17 (severe anxiety)   Total Score 20 19 17     Was on wellbutrin for possible ADHD.  Seemed to help but there wasn't much of a change.  Previously on zoloft and paxil (had side effects)    Review of Systems   Constitutional, HEENT, cardiovascular, pulmonary, gi and gu  systems are negative, except as otherwise noted.      Objective           Vitals:  No vitals were obtained today due to virtual visit.    Physical Exam   GENERAL: Healthy, alert and no distress  EYES: Eyes grossly normal to inspection.  No discharge or erythema, or obvious scleral/conjunctival abnormalities.  RESP: No audible wheeze, cough, or visible cyanosis.  No visible retractions or increased work of breathing.    SKIN: Visible skin clear. No significant rash, abnormal pigmentation or lesions.  NEURO: Cranial nerves grossly intact.  Mentation and speech appropriate for age.  PSYCH: Mentation appears normal, affect normal/bright, judgement and insight intact, normal speech and appearance well-groomed.                Video-Visit Details    Video Start Time: 2:49 PM    Type of service:  Video Visit    Video End Time:2:59 PM    Originating Location (pt. Location): Home    Distant Location (provider location):  [facility]     Platform used for Video Visit: [employer]  Cesar.

## 2022-07-19 ENCOUNTER — NURSE TRIAGE (OUTPATIENT)
Dept: NURSING | Facility: CLINIC | Age: 42
End: 2022-07-19

## 2022-07-20 NOTE — TELEPHONE ENCOUNTER
"Sharlene has a history of Tension headaches and Migraines\"    She has a headache tonight and wonders what else she can do for pain control    - 2:00 pm - Took 2 Excedrine Migraine  - 7:30 pm - Took 2 Aleve  - Hot and cold compress, alternating    - Advised that she could take Acetaminophen, up to 3000 mg per day, per package instructions  - Continue Aleve per package instructions  - Cool/cold comresses  - Rest in a cool dark room, Try to sleep    See PCP within 2 weeks  Care Advice reviewed    COVID 19 Nurse Triage Plan/Patient Instructions    Please be aware that novel coronavirus (COVID-19) may be circulating in the community. If you develop symptoms such as fever, cough, or SOB or if you have concerns about the presence of another infection including coronavirus (COVID-19), please contact your health care provider or visit [email or web address].     Disposition/Instructions    In-Person Visit with provider recommended. Reference Visit Selection Guide.    Thank you for taking steps to prevent the spread of this virus.  o Limit your contact with others.  o Wear a simple mask to cover your cough.  o Wash your hands well and often.    Resources    [facility]: About COVID-19: [email or web address]    CDC: What to Do If You're Sick: www.cdc.gov/coronavirus/2019-ncov/about/steps-when-sick.html    CDC: Ending Home Isolation: www.cdc.gov/coronavirus/2019-ncov/hcp/disposition-in-home-patients.html     CDC: Caring for Someone: www.cdc.gov/coronavirus/2019-ncov/if-you-are-sick/care-for-someone.html     [facility]: Interim Guidance for Hospital Discharge to Home: www.health.[facility].[place]/diseases/coronavirus/hcp/hospdischarge.pdf    [facility] clinical trials (COVID-19 research studies): clinicalaffairs.[facility]/umn-clinical-trials     Below are the COVID-19 hotlines at the [place] Department of Health ([facility]). Interpreters are available.   o For health questions: Call 868-095-8326 or " "1-807.450.3406 (7 a.m. to 7 p.m.)  o For questions about schools and childcare: Call 615-881-0671 or 1-583.174.6136 (7 a.m. to 7 p.m.)     [facility] Nurse Advisors      Reason for Disposition    Headache is a chronic symptom (recurrent or ongoing AND present > 4 weeks)    Additional Information    Negative: Difficult to awaken or acting confused (e.g., disoriented, slurred speech)    Negative: [1] Weakness of the face, arm or leg on one side of the body AND [2] new onset    Negative: [1] Numbness of the face, arm or leg on one side of the body AND [2] new onset    Negative: [1] Loss of speech or garbled speech AND [2] new onset    Negative: Passed out (i.e., lost consciousness, collapsed and was not responding)    Negative: Sounds like a life-threatening emergency to the triager    Negative: Unable to walk, or can only walk with assistance (e.g., requires support)    Negative: Stiff neck (can't touch chin to chest)    Negative: Severe pain in one eye    Negative: [1] Other family members (or roommates) with headaches AND [2] possibility of carbon monoxide exposure    Negative: [1] SEVERE headache (e.g., excruciating) AND [2] \"worst headache\" of life    Negative: [1] SEVERE headache AND [2] sudden-onset (i.e., reaching maximum intensity within seconds)    Negative: [1] SEVERE headache AND [2] fever    Negative: Loss of vision or double vision (Exception: same as prior migraines)    Negative: [1] Fever > 100.0 F (37.8 C) AND [2] diabetes mellitus or weak immune system (e.g., HIV positive, cancer chemo, splenectomy, organ transplant, chronic steroids)    Negative: Patient sounds very sick or weak to the triager    Negative: [1] MILD-MODERATE headache AND [2] present > 72 hours    Negative: Headache started during sex    Negative: [1] MODERATE headache (e.g., interferes with normal activities) AND [2] present > 24 hours AND [3] unexplained  (Exceptions: analgesics not tried, typical " migraine, or headache part of viral illness)    Negative: [1] New headache AND [2] weak immune system (e.g., HIV positive, cancer chemo, splenectomy, organ transplant, chronic steroids)    Negative: [1] New headache AND [2] age > 50    Negative: [1] Sinus pain of forehead AND [2] yellow or green nasal discharge    Negative: Fever present > 3 days (72 hours)    Protocols used: HEADACHE-A-AH

## 2022-07-22 ENCOUNTER — PATIENT OUTREACH (OUTPATIENT)
Dept: OBGYN | Facility: CLINIC | Age: 42
End: 2022-07-22

## 2022-07-22 DIAGNOSIS — D06.9 CIN III WITH SEVERE DYSPLASIA: ICD-10-CM

## 2022-09-21 NOTE — TELEPHONE ENCOUNTER
FYI to provider - Patient is lost to pap tracking follow-up. Attempts to contact pt have been made per reminder process and there has been no reply and/or no appt scheduled. Contact hx listed below.     11/29/17 NIL pap, + HR HPV 16. Plan colp due by 2/29/18 12/14/17 [place] Bx WINDY 2 & 3, ECC Negative. Plan LEEP  2/2/18 LEEP WINDY 3, margins clear. Plan: cotesting 1 year  3/22/19 NIL, neg HR HPV. Plan cotest in 1 year.  8/14/20 NIL pap, Neg HPV. Plan cotest in 1 year  8/13/21 NIL Pap, Neg HPV. Plan cotest in 1 year  7/22/22 Reminder mychart  8/22/22 Reminder call -- left message  9/21/22 Lost to follow-up for pap tracking     Patrizia Doherty RN BSN, Pap Tracking

## 2023-01-14 ENCOUNTER — HEALTH MAINTENANCE LETTER (OUTPATIENT)
Age: 43
End: 2023-01-14

## 2023-06-05 ENCOUNTER — OFFICE VISIT (OUTPATIENT)
Dept: MIDWIFE SERVICES | Facility: CLINIC | Age: 43
End: 2023-06-05
Payer: COMMERCIAL

## 2023-06-05 ENCOUNTER — TELEPHONE (OUTPATIENT)
Dept: OBGYN | Facility: CLINIC | Age: 43
End: 2023-06-05

## 2023-06-05 VITALS
HEIGHT: 66 IN | WEIGHT: 180.6 LBS | SYSTOLIC BLOOD PRESSURE: 152 MMHG | BODY MASS INDEX: 29.02 KG/M2 | DIASTOLIC BLOOD PRESSURE: 86 MMHG

## 2023-06-05 DIAGNOSIS — G43.B1 INTRACTABLE OPHTHALMOPLEGIC MIGRAINE: ICD-10-CM

## 2023-06-05 DIAGNOSIS — R41.840 LACK OF CONCENTRATION: ICD-10-CM

## 2023-06-05 DIAGNOSIS — Z13.228 SCREENING FOR METABOLIC DISORDER: ICD-10-CM

## 2023-06-05 DIAGNOSIS — Z12.4 SCREENING FOR CERVICAL CANCER: ICD-10-CM

## 2023-06-05 DIAGNOSIS — F41.9 ANXIETY: ICD-10-CM

## 2023-06-05 DIAGNOSIS — N63.15 BREAST LUMP ON RIGHT SIDE AT 9 O'CLOCK POSITION: ICD-10-CM

## 2023-06-05 DIAGNOSIS — Z01.419 ENCNTR FOR GYN EXAM (GENERAL) (ROUTINE) W/O ABN FINDINGS: Primary | ICD-10-CM

## 2023-06-05 DIAGNOSIS — F32.A DEPRESSION, UNSPECIFIED DEPRESSION TYPE: ICD-10-CM

## 2023-06-05 DIAGNOSIS — Z83.3 FAMILY HISTORY OF DIABETES MELLITUS: ICD-10-CM

## 2023-06-05 DIAGNOSIS — L65.9 HAIR LOSS: ICD-10-CM

## 2023-06-05 DIAGNOSIS — N93.9 ABNORMAL UTERINE BLEEDING (AUB): ICD-10-CM

## 2023-06-05 LAB
ALBUMIN SERPL BCG-MCNC: 4.7 G/DL (ref 3.5–5.2)
ALP SERPL-CCNC: 70 U/L (ref 35–104)
ALT SERPL W P-5'-P-CCNC: 16 U/L (ref 10–35)
ANION GAP SERPL CALCULATED.3IONS-SCNC: 12 MMOL/L (ref 7–15)
AST SERPL W P-5'-P-CCNC: 18 U/L (ref 10–35)
BILIRUB SERPL-MCNC: 0.3 MG/DL
BUN SERPL-MCNC: 12.7 MG/DL (ref 6–20)
CALCIUM SERPL-MCNC: 9.5 MG/DL (ref 8.6–10)
CHLORIDE SERPL-SCNC: 101 MMOL/L (ref 98–107)
CREAT SERPL-MCNC: 0.7 MG/DL (ref 0.51–0.95)
DEPRECATED HCO3 PLAS-SCNC: 26 MMOL/L (ref 22–29)
ERYTHROCYTE [DISTWIDTH] IN BLOOD BY AUTOMATED COUNT: 12.8 % (ref 10–15)
GFR SERPL CREATININE-BSD FRML MDRD: >90 ML/MIN/1.73M2
GLUCOSE SERPL-MCNC: 106 MG/DL (ref 70–99)
HBA1C MFR BLD: 5.9 % (ref 0–5.6)
HCT VFR BLD AUTO: 38.5 % (ref 35–47)
HGB BLD-MCNC: 13 G/DL (ref 11.7–15.7)
MCH RBC QN AUTO: 28.7 PG (ref 26.5–33)
MCHC RBC AUTO-ENTMCNC: 33.8 G/DL (ref 31.5–36.5)
MCV RBC AUTO: 85 FL (ref 78–100)
PLATELET # BLD AUTO: 358 10E3/UL (ref 150–450)
POTASSIUM SERPL-SCNC: 3.8 MMOL/L (ref 3.4–5.3)
PROT SERPL-MCNC: 7.7 G/DL (ref 6.4–8.3)
RBC # BLD AUTO: 4.53 10E6/UL (ref 3.8–5.2)
SODIUM SERPL-SCNC: 139 MMOL/L (ref 136–145)
TSH SERPL DL<=0.005 MIU/L-ACNC: 2.39 UIU/ML (ref 0.3–4.2)
WBC # BLD AUTO: 11.7 10E3/UL (ref 4–11)

## 2023-06-05 PROCEDURE — 99396 PREV VISIT EST AGE 40-64: CPT | Performed by: NURSE PRACTITIONER

## 2023-06-05 PROCEDURE — G0145 SCR C/V CYTO,THINLAYER,RESCR: HCPCS | Performed by: NURSE PRACTITIONER

## 2023-06-05 PROCEDURE — 87624 HPV HI-RISK TYP POOLED RSLT: CPT | Performed by: NURSE PRACTITIONER

## 2023-06-05 PROCEDURE — 85027 COMPLETE CBC AUTOMATED: CPT | Performed by: NURSE PRACTITIONER

## 2023-06-05 PROCEDURE — 36415 COLL VENOUS BLD VENIPUNCTURE: CPT | Performed by: NURSE PRACTITIONER

## 2023-06-05 PROCEDURE — 84443 ASSAY THYROID STIM HORMONE: CPT | Performed by: NURSE PRACTITIONER

## 2023-06-05 PROCEDURE — 99214 OFFICE O/P EST MOD 30 MIN: CPT | Mod: 25 | Performed by: NURSE PRACTITIONER

## 2023-06-05 PROCEDURE — 80053 COMPREHEN METABOLIC PANEL: CPT | Performed by: NURSE PRACTITIONER

## 2023-06-05 PROCEDURE — 83036 HEMOGLOBIN GLYCOSYLATED A1C: CPT | Performed by: NURSE PRACTITIONER

## 2023-06-05 RX ORDER — SUMATRIPTAN 50 MG/1
50 TABLET, FILM COATED ORAL
Qty: 120 TABLET | Refills: 0 | Status: SHIPPED | OUTPATIENT
Start: 2023-06-05 | End: 2023-07-05

## 2023-06-05 RX ORDER — BUPROPION HYDROCHLORIDE 150 MG/1
150 TABLET ORAL EVERY MORNING
Qty: 90 TABLET | Refills: 0 | Status: SHIPPED | OUTPATIENT
Start: 2023-06-05 | End: 2023-06-05

## 2023-06-05 RX ORDER — BUPROPION HYDROCHLORIDE 150 MG/1
150 TABLET ORAL EVERY MORNING
Qty: 90 TABLET | Refills: 0 | Status: SHIPPED | OUTPATIENT
Start: 2023-06-05 | End: 2024-01-16

## 2023-06-05 ASSESSMENT — PATIENT HEALTH QUESTIONNAIRE - PHQ9
5. POOR APPETITE OR OVEREATING: MORE THAN HALF THE DAYS
SUM OF ALL RESPONSES TO PHQ QUESTIONS 1-9: 14

## 2023-06-05 ASSESSMENT — ANXIETY QUESTIONNAIRES
GAD7 TOTAL SCORE: 14
5. BEING SO RESTLESS THAT IT IS HARD TO SIT STILL: MORE THAN HALF THE DAYS
7. FEELING AFRAID AS IF SOMETHING AWFUL MIGHT HAPPEN: SEVERAL DAYS
6. BECOMING EASILY ANNOYED OR IRRITABLE: NEARLY EVERY DAY
GAD7 TOTAL SCORE: 14
3. WORRYING TOO MUCH ABOUT DIFFERENT THINGS: MORE THAN HALF THE DAYS
1. FEELING NERVOUS, ANXIOUS, OR ON EDGE: NEARLY EVERY DAY
2. NOT BEING ABLE TO STOP OR CONTROL WORRYING: SEVERAL DAYS
IF YOU CHECKED OFF ANY PROBLEMS ON THIS QUESTIONNAIRE, HOW DIFFICULT HAVE THESE PROBLEMS MADE IT FOR YOU TO DO YOUR WORK, TAKE CARE OF THINGS AT HOME, OR GET ALONG WITH OTHER PEOPLE: VERY DIFFICULT

## 2023-06-05 NOTE — PROGRESS NOTES
".Sharlene is a 43 year old  female who presents for annual exam.     Besides routine health maintenance, she has no other health concerns today .  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 She endorses some recent stress and has several things she'd like to discuss:  1. Heavy periods. Periods have gotten heavier over the past couple years. Endorses clots and frequently changing pads and tampons. Still ocrurring regularly. Does not use anything for contraception - has a history of infertility. Declines STI testing.  2. Perimenopause vs Diabetes vs thyroid concerns: Endorses weight gain, fatigue, tired, thinning hair. Endorses difficulty losing weight over the past couple years, particularly around her midsection. Tries to walk and eat healthy. Denies night sweats, hot flashes. Concerned about diabetes because and had GDM and her mother and sister have diabetes. Denies polydipsia, polyuria, polyphagia.   3. Frequent migraines. Endorses an aura. Was previously taking Immitrex that worked for her.   4. Mental health concerns. Endorses a recent period of stress in her life with her work and home life being busy. Recently stopped taking Wellbutrin stating she \"wanted to see if she could be done.\" Her  tells her that she does better on it.   5. Breast lump. Patient describes finding a tender lump on her right breast over the weekend.   Menses are regular q 28-30 days and crampy, heavy, painful and regular lasting 5 days.   Menses flow: normal, heavy, spotty and with clots.    LMP is 23  Using none for contraception.    She is not currently considering pregnancy.    REPRODUCTIVE/GYNECOLOGIC HISTORY:  Sharlene is sexually active with male partner(s) and is currently in monogamous relationship.   STI testing offered?  Declined  History of abnormal Pap smear:  Yes  SOCIAL HISTORY  Abuse: does not report having previously been physical or sexually abused.    Do you feel safe in your environment? YES   "     She  reports that she has never smoked. She has never used smokeless tobacco.      Last PHQ-9 score on record =       2023     2:50 PM   PHQ-9 SCORE   PHQ-9 Total Score 14     Last GAD7 score on record =       2023     2:50 PM   MARLENE-7 SCORE   Total Score 14     Alcohol Score = 1    HEALTH MAINTENANCE:  Care Gaps    Overdue     Never   Done ADVANCE CARE PLANNING (Every 5 Years)     Never   Done HEPATITIS B IMMUNIZATION (1 of 3 - 3-dose series)     Never   Done HEPATITIS C SCREENING (Once)     MAY 29   2019 LIPID (Yearly)  Last completed: May 29, 2018   APR 22   2021 COVID-19 Vaccine (3 - Moderna series)  Last completed: 2021   AUG 13   2022 PAP FOLLOW-UP (Once)   Last ordered: 2023   AUG 13   2022 HPV FOLLOW-UP (Once)   Last ordered: 2023     Upcoming    SEP 1   2023 INFLUENZA VACCINE (Season Ended)  Last completed: Oct 2, 2020   NOV 25   2023 DTAP/TDAP/TD IMMUNIZATION (4 - Td or Tdap)  Last completed: 2013   DEC 5   2023 PHQ-9 (Every 6 Months)  Last completed:  YEARLY PREVENTIVE VISIT (Yearly)  Last completed: 2023           HEALTHY HABITS  Eating habits: eats regular meals  Calcium/Vitamin D intake: source:  none Adequate? Probably not - suggested a supplement    Exercise: How often do you exercise? 1-3 times/week;Walking   Have you had an eye exam in the last two years? YES    Do you routinely see the dentist once or twice yearly? YES        HISTORY:  OB History    Para Term  AB Living   2 2 2 0 0 2   SAB IAB Ectopic Multiple Live Births   0 0 0 0 2      # Outcome Date GA Lbr Lance/2nd Weight Sex Delivery Anes PTL Lv   2 Term 14 39w3d  3.742 kg (8 lb 4 oz) F CS-LTranv Spinal  GABRIELA      Birth Comments: Repeat      Name: Theresa      Apgar1: 9  Apgar5: 9   1 Term 11 39w0d 21:00 3.629 kg (8 lb) F CS-LTranv EPI N GABRIELA      Birth Comments: arrest of dilation 8cm, Mild PIH      Name: Nova Natarajan      Apgar1: 9   Apgar5: 9     Past Medical History:   Diagnosis Date     Anxiety      Cervical high risk HPV (human papillomavirus) test positive 2017    type 16     Chickenpox      Chlamydia 2000     Depression      Infertility      Mild preeclampsia      Past Surgical History:   Procedure Laterality Date      SECTION  2011    Procedure: SECTION;  Section; Surgeon:ABELINO CABAN; Location:[facility] OR      SECTION  2014    Procedure:  SECTION;   Section;  Surgeon: Abelino Caban MD;  Location: [facility] OR     Family History   Problem Relation Age of Onset     Cancer Mother         cervical     Heart Disease Mother         MVP     Thyroid Disease Mother      Gastrointestinal Disease Mother         IBS     Diabetes Mother      Hypertension Mother      Myocardial Infarction Mother      Psychotic Disorder Father         bipolar     Depression Father      Diabetes Maternal Grandfather      Prostate Cancer Paternal Grandfather      Heart Disease Paternal Grandfather         pacemaker     Allergies Sister      Depression Sister      Osteoporosis Paternal Grandmother      Social History     Socioeconomic History     Marital status:      Spouse name: Alexandro Natarajan     Number of children: 2     Years of education: 16+     Highest education level: None   Occupational History     Occupation: Instructor/     Employer: [employer]   Tobacco Use     Smoking status: Never     Smokeless tobacco: Never   Vaping Use     Vaping status: Never Used   Substance and Sexual Activity     Alcohol use: No     Alcohol/week: 0.0 standard drinks of alcohol     Drug use: No     Sexual activity: Yes     Partners: Male     Birth control/protection: None     Comment: none   Other Topics Concern     Parent/sibling w/ CABG, MI or angioplasty before 65F 55M? No       Current Outpatient Medications:      buPROPion (WELLBUTRIN XL) 150 MG 24 hr tablet, Take 1 tablet (150  "mg) by mouth every morning, Disp: 90 tablet, Rfl: 0     SUMAtriptan (IMITREX) 50 MG tablet, Take 1 tablet (50 mg) by mouth at onset of headache for migraine May repeat in 2 hours. Max 4 tablets/24 hours., Disp: 120 tablet, Rfl: 0     Allergies   Allergen Reactions     Pcn [Penicillins] Hives       Past medical, surgical, social and family history were reviewed and updated in EPIC.    ROS:   12 point review of systems negative other than symptoms noted below or in the HPI.    PHYSICAL EXAM:  BP (!) 152/86   Ht 1.676 m (5' 6\")   Wt 81.9 kg (180 lb 9.6 oz)   LMP 05/11/2023   BMI 29.15 kg/m     BMI: Body mass index is 29.15 kg/m .  Constitutional: healthy, alert and no distress  Neck: symmetrical, thyroid normal size, no masses present, no lymphadenopathy present.   Cardiovascular: RRR, no murmurs present  Respiratory: breathing unlabored, lungs CTA bilaterally  Breast:normal without masses, tenderness or nipple discharge, mass right breast 9 o'clock and tenderness with palpation, 2cm x 2xcm  Gastrointestinal: abdomen soft, non-tender, bowel sounds present  PELVIC EXAM:  Vulva: No lesions, no adenopathy, BUS WNL  Vagina: Moist, pink, discharge normal  well rugated, no lesions  Cervix:smooth, pink, no visible lesions  Uterus: Normal size, non-tender, mobile  Ovaries: No masses palpated  Rectal exam: deferred    ASSESSMENT/PLAN:    ICD-10-CM    1. Encntr for gyn exam (general) (routine) w/o abn findings  Z01.419 CBC with platelets     TSH with free T4 reflex     Hemoglobin A1c     Comprehensive metabolic panel (BMP + Alb, Alk Phos, ALT, AST, Total. Bili, TP)     CBC with platelets     TSH with free T4 reflex     Hemoglobin A1c     Comprehensive metabolic panel (BMP + Alb, Alk Phos, ALT, AST, Total. Bili, TP)      2. Screening for metabolic disorder  Z13.228 TSH with free T4 reflex     Hemoglobin A1c     Comprehensive metabolic panel (BMP + Alb, Alk Phos, ALT, AST, Total. Bili, TP)     TSH with free T4 reflex     " Hemoglobin A1c     Comprehensive metabolic panel (BMP + Alb, Alk Phos, ALT, AST, Total. Bili, TP)      3. Hair loss  L65.9 TSH with free T4 reflex     TSH with free T4 reflex      4. Abnormal uterine bleeding (AUB)  N93.9 CBC with platelets     TSH with free T4 reflex     CBC with platelets     TSH with free T4 reflex      5. Family history of diabetes mellitus  Z83.3 Hemoglobin A1c     Hemoglobin A1c      6. Screening for cervical cancer  Z12.4 Pap thin layer screen with HPV - recommended age 30 - 65 years      7. Anxiety  F41.9 buPROPion (WELLBUTRIN XL) 150 MG 24 hr tablet     DISCONTINUED: buPROPion (WELLBUTRIN XL) 150 MG 24 hr tablet      8. Depression, unspecified depression type  F32.A buPROPion (WELLBUTRIN XL) 150 MG 24 hr tablet     DISCONTINUED: buPROPion (WELLBUTRIN XL) 150 MG 24 hr tablet      9. Lack of concentration  R41.840 buPROPion (WELLBUTRIN XL) 150 MG 24 hr tablet     DISCONTINUED: buPROPion (WELLBUTRIN XL) 150 MG 24 hr tablet      10. 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 SUMAtriptan (IMITREX) 50 MG tablet        Results for orders placed or performed in visit on 06/05/23   CBC with platelets     Status: Abnormal   Result Value Ref Range    WBC Count 11.7 (H) 4.0 - 11.0 10e3/uL    RBC Count 4.53 3.80 - 5.20 10e6/uL    Hemoglobin 13.0 11.7 - 15.7 g/dL    Hematocrit 38.5 35.0 - 47.0 %    MCV 85 78 - 100 fL    MCH 28.7 26.5 - 33.0 pg    MCHC 33.8 31.5 - 36.5 g/dL    RDW 12.8 10.0 - 15.0 %    Platelet Count 358 150 - 450 10e3/uL   Hemoglobin A1c     Status: Abnormal   Result Value Ref Range    Hemoglobin A1C 5.9 (H) 0.0 - 5.6 %         COUNSELING:   Reviewed preventive health counseling, as reflected in patient instructions       Regular exercise       Healthy diet/nutrition       Contraception       Mary Anne Ching   reports that she has never smoked. She has  never used smokeless tobacco.    Heavy Periods:   CBC Ordered to check for anemia. Discussed the Mirena IUD as an option for HMB. Patient states she's interested in the Mirena and will do her own research prior to scheduling an appointment for insertion.     Perimenopause vs Diabetes vs thyroid concerns:   TSH, Hemoglobin A1C ordered. Discussed common perimenopausal symptoms.     Frequent migraines:  Immitrex prescription refilled. Discussed that there are other triptan options if she doesn't like the side effects.     Mental health concerns:   Not interested in therapy at this time. Plans to restart her previous Wellbutrin dose - prescription refilled until she can be seen with a primary care provider to discuss other options. Reviewed common SSRIs with the [facility] selective serotonin reuptake inhibitor decision guide. Will consider options and discuss with her PCP.     Breast lump:   Had a screening mammogram today. Clinic called to inform them of the lump - patient will call and make an appointment for a diagnostic mammogram.     Pap per ASCCP guidelines.    Encouraged/discussed to establish care with new primary care provider.  Desires to do research and will find provider.    YOLI Cash student   [facility]    Provider Disclosure:  I was present with the MARY student who participated in the service and in the documentation of the services provided. I have verified the history and personally performed the physical exam and medical decision-making, as documented by the student and edited by me.      Kathya VIEYRA, MARY, [facility]  [phone]  [facility]  [phone]         In addition to the preventive visit, 27  minutes of the appointment were spent evaluating and developing a treatment plan for her additional concern(s): mental health, thyroid/diabetes, perimenopause/bleeding concerns, breast lump, migraine HAs.    Est 5    40

## 2023-06-05 NOTE — PATIENT INSTRUCTIONS
Preventive Health Recommendations  Female Ages 40-50    Yearly exam:   See your health care provider every year in order to  Review health changes   Discuss preventive care    Review your medicines if your provider prescribed any    Get a Pap test every five years with co-testing for HPV (unless you have an abnormal result and your provider advises testing more often)     You do not need a Pap test if your uterus was removed (hysterectomy) and you have not had cancer    You should be tested each year for STD's (sexually transmitted diseases) if you are at risk    Talk with your provider about starting mammogram screenings for breast cancer and how often you should be screened    Have a cholesterol test every 3-5 years     Have a diabetes test (fasting glucose) after age 45. If you are at risk for diabetes, you should have this test every 3 years    You may begin to experience some changes in your menstrual cycle as you age, you may also begin to have some symptoms of perimenopause such as hot flashes. Women typically go through menopause anytime between 45-55 years of age.    Shots: Get a flu shot each year. Get a tetanus shot every 10 years.     Nutrition:   Eat at least 5 servings of fruits and vegetables each day  Eat REAL food, stay away from processed foods  Eat whole-grain bread, whole-wheat pasta and brown rice instead of white grains and rice  For bone health:  Eat calcium-rich foods or take calcium pills (500 to 600 mg) twice a day with food (1200 mg per day). Also take vitamin D (1,000-3,000 IUs) each day.     Lifestyle  Exercise at least 150 minutes a week (an average of 30 minutes a day, 5 days a week). This will help you control your weight and prevent disease.  Limit alcohol to one drink per day  No smoking   Wear sunscreen to prevent skin cancer  See your dentist every six months for an exam and cleaning  Weight bearing exercise to encourage good bone health prevent osteoporosis

## 2023-06-08 ENCOUNTER — HOSPITAL ENCOUNTER (OUTPATIENT)
Dept: MAMMOGRAPHY | Facility: CLINIC | Age: 43
Discharge: HOME | End: 2023-06-08
Attending: NURSE PRACTITIONER
Payer: COMMERCIAL

## 2023-06-08 ENCOUNTER — ANCILLARY ORDERS (OUTPATIENT)
Dept: MIDWIFE SERVICES | Facility: CLINIC | Age: 43
End: 2023-06-08

## 2023-06-08 DIAGNOSIS — N63.15 BREAST LUMP ON RIGHT SIDE AT 9 O'CLOCK POSITION: ICD-10-CM

## 2023-06-08 LAB
BKR LAB AP GYN ADEQUACY: NORMAL
BKR LAB AP GYN INTERPRETATION: NORMAL
BKR LAB AP HPV REFLEX: NORMAL
BKR LAB AP PREVIOUS ABNORMAL: NORMAL
PATH REPORT.COMMENTS IMP SPEC: NORMAL
PATH REPORT.COMMENTS IMP SPEC: NORMAL
PATH REPORT.RELEVANT HX SPEC: NORMAL

## 2023-06-08 PROCEDURE — 77062 BREAST TOMOSYNTHESIS BI: CPT

## 2023-06-08 PROCEDURE — 76642 ULTRASOUND BREAST LIMITED: CPT | Mod: RT

## 2024-01-16 ENCOUNTER — OFFICE VISIT (OUTPATIENT)
Dept: FAMILY MEDICINE | Facility: CLINIC | Age: 44
End: 2024-01-16
Payer: COMMERCIAL

## 2024-01-16 VITALS
HEIGHT: 66 IN | OXYGEN SATURATION: 95 % | WEIGHT: 185.5 LBS | RESPIRATION RATE: 16 BRPM | BODY MASS INDEX: 29.81 KG/M2 | TEMPERATURE: 98.7 F | DIASTOLIC BLOOD PRESSURE: 92 MMHG | HEART RATE: 91 BPM | SYSTOLIC BLOOD PRESSURE: 164 MMHG

## 2024-01-16 DIAGNOSIS — R41.840 LACK OF CONCENTRATION: ICD-10-CM

## 2024-01-16 DIAGNOSIS — F32.A DEPRESSION, UNSPECIFIED DEPRESSION TYPE: ICD-10-CM

## 2024-01-16 DIAGNOSIS — F41.9 ANXIETY: ICD-10-CM

## 2024-01-16 DIAGNOSIS — Z13.1 SCREENING FOR DIABETES MELLITUS: ICD-10-CM

## 2024-01-16 DIAGNOSIS — G43.711 INTRACTABLE CHRONIC MIGRAINE WITHOUT AURA AND WITH STATUS MIGRAINOSUS: Primary | ICD-10-CM

## 2024-01-16 DIAGNOSIS — E66.3 OVERWEIGHT (BMI 25.0-29.9): ICD-10-CM

## 2024-01-16 PROCEDURE — 90471 IMMUNIZATION ADMIN: CPT

## 2024-01-16 PROCEDURE — 99214 OFFICE O/P EST MOD 30 MIN: CPT | Mod: 25

## 2024-01-16 PROCEDURE — 90715 TDAP VACCINE 7 YRS/> IM: CPT

## 2024-01-16 RX ORDER — RIZATRIPTAN BENZOATE 5 MG/1
5-10 TABLET ORAL
Qty: 30 TABLET | Refills: 1 | Status: SHIPPED | OUTPATIENT
Start: 2024-01-16 | End: 2024-06-07

## 2024-01-16 RX ORDER — PROPRANOLOL HYDROCHLORIDE 20 MG/1
20 TABLET ORAL 3 TIMES DAILY
Qty: 270 TABLET | Refills: 0 | Status: SHIPPED | OUTPATIENT
Start: 2024-01-16 | End: 2024-04-15

## 2024-01-16 RX ORDER — BUPROPION HYDROCHLORIDE 300 MG/1
300 TABLET ORAL EVERY MORNING
Qty: 90 TABLET | Refills: 1 | Status: SHIPPED | OUTPATIENT
Start: 2024-01-16

## 2024-01-16 ASSESSMENT — ANXIETY QUESTIONNAIRES
GAD7 TOTAL SCORE: 14
7. FEELING AFRAID AS IF SOMETHING AWFUL MIGHT HAPPEN: SEVERAL DAYS
5. BEING SO RESTLESS THAT IT IS HARD TO SIT STILL: NEARLY EVERY DAY
6. BECOMING EASILY ANNOYED OR IRRITABLE: NEARLY EVERY DAY
1. FEELING NERVOUS, ANXIOUS, OR ON EDGE: MORE THAN HALF THE DAYS
8. IF YOU CHECKED OFF ANY PROBLEMS, HOW DIFFICULT HAVE THESE MADE IT FOR YOU TO DO YOUR WORK, TAKE CARE OF THINGS AT HOME, OR GET ALONG WITH OTHER PEOPLE?: VERY DIFFICULT
GAD7 TOTAL SCORE: 14
7. FEELING AFRAID AS IF SOMETHING AWFUL MIGHT HAPPEN: SEVERAL DAYS
3. WORRYING TOO MUCH ABOUT DIFFERENT THINGS: MORE THAN HALF THE DAYS
IF YOU CHECKED OFF ANY PROBLEMS ON THIS QUESTIONNAIRE, HOW DIFFICULT HAVE THESE PROBLEMS MADE IT FOR YOU TO DO YOUR WORK, TAKE CARE OF THINGS AT HOME, OR GET ALONG WITH OTHER PEOPLE: VERY DIFFICULT
4. TROUBLE RELAXING: MORE THAN HALF THE DAYS
2. NOT BEING ABLE TO STOP OR CONTROL WORRYING: SEVERAL DAYS
GAD7 TOTAL SCORE: 14

## 2024-01-16 ASSESSMENT — PATIENT HEALTH QUESTIONNAIRE - PHQ9
10. IF YOU CHECKED OFF ANY PROBLEMS, HOW DIFFICULT HAVE THESE PROBLEMS MADE IT FOR YOU TO DO YOUR WORK, TAKE CARE OF THINGS AT HOME, OR GET ALONG WITH OTHER PEOPLE: VERY DIFFICULT
SUM OF ALL RESPONSES TO PHQ QUESTIONS 1-9: 17
SUM OF ALL RESPONSES TO PHQ QUESTIONS 1-9: 17

## 2024-01-16 NOTE — PATIENT INSTRUCTIONS
For headaches:  We will start propranolol for headache prevention. This is one pill taken three times daily. Your total dose will be 60mg which is relatively low; they recommend a trial of 2-3 months to see if the medication is going to be effective  We will STOP imitrex and begin Maxalt. They are the same kind of medication and should be taken in the same way. You should not take Maxalt more than 8 times a month  Try to decrease your use of ibuprofen/tylenol, as I believe some of your symptoms are related to medication over use.    For mental health:  Your Wellbutrin was increased to 300mg daily. Try this for at least 4-6 weeks. If improving, you can stay at that current dose. If not noticing good control of symptoms, let's consider additional medications or treatments.    For hormones:  [facility] is an excellent resource specific to women's health and hormones.   If mental health and headaches are better but weight continues to be a challenge, I would recommend a follow up visit to discuss more in depth. We do have medical weight management providers at the [facility], but I am not one of them. Dr. Nieto is a provider taking new patients that also specializes in weight management.    Schedule your annual exam

## 2024-01-16 NOTE — PROGRESS NOTES
Assessment & Plan   Problem List Items Addressed This Visit       Depression     Currently uncontrolled. Patient is new to me, but endorses concern related to mental health. This has been an ongoing concern for a number of years. She has completed trials of multiple medications including duloxetine (experienced side effects), escitalopram (did not find it effective/does not remember) and paroxetine (not effective). She has noticed some improvement on Wellbutrin and we discussed it would be reasonable to increase this to 300mg and evaluate efficacy/tolerability. Can always consider referral to talk therapy, but patient declines today. Endorses good support and denies any concern for self harm/suicide. Encouraged her to follow up with any additional needs.         Relevant Medications    buPROPion (WELLBUTRIN XL) 300 MG 24 hr tablet    Intractable chronic migraine without aura and with status migrainosus - Primary     Currently uncontrolled. Based on patient's description of headaches and their frequency, I do believe she is a good candidate for migraine preventative therapy. We discussed options today and after this discussion including propranolol, amitriptyline and Topamax, patient has elected to start with propranolol. Will intiate 20mg TID with a plan to titrate up if patient notices improvement. We discussed medication overuse is also likely playing a role as she is taking ibuprofen/tylenol upwards of 4-5 times a week and she will plan to reduce her use as she is able. She has not found success in Imitrex but is interested in alternative abortive therapy, so will trial Maxalt 5-10mg with the onset of headaches. Expected therapeutic effects and potential side effects were discussed regarding both of these new medications. Patient expressed an understanding of and agreement with this plan. All questions were answered.         Relevant Medications    rizatriptan (MAXALT) 5 MG tablet    buPROPion (WELLBUTRIN XL)  300 MG 24 hr tablet    propranolol (INDERAL) 20 MG tablet    Overweight (BMI 25.0-29.9)     Patient expresses concern that her weight challenges are related to hormones, as she is also experiencing changes in her menstrual cycles and worsened mood. We were unable to discuss this in depth due to time constraints of our visit and our management of two other chronic conditions, however I have encouraged her to look into [facility] as a resource specific to women's health and hormones. If this workup is unrevealing and patient's mental health improves without improvement in weight, would recommend an office visit to discuss metabolic health and other strategies.           Other Visit Diagnoses       Anxiety        Relevant Medications    buPROPion (WELLBUTRIN XL) 300 MG 24 hr tablet    Lack of concentration        Relevant Medications    buPROPion (WELLBUTRIN XL) 300 MG 24 hr tablet    Screening for diabetes mellitus        Relevant Orders    Hemoglobin A1c           Depression Screening Follow Up        1/16/2024     2:41 PM   PHQ   PHQ-9 Total Score 17   Q9: Thoughts of better off dead/self-harm past 2 weeks Not at all     Follow Up Actions Taken  Crisis resource information provided in After Visit Summary  Adjusted patient's anti-depressant medication.       AZAL[facility]    Marilyn Su is a 43 year old, presenting for the following health issues:  Migraine Mgmt (Has used Imitrex does not help too much.), Depression (And anxiety-Has used Wellbutrin in past), and Weight Loss (Hormone related and diabetes.)        1/16/2024     2:50 PM   Additional Questions   Roomed by sac   Accompanied by self         1/16/2024     2:50 PM   Patient Reported Additional Medications   Patient reports taking the following new medications no     Migraines:  -History of migraines for about 10 years. Questionable aura? She notes some dryness/watering of her eyes  "  -Worsening in the last year  -Worsen around her menstrual cycle  -Do not respond to ibuprofen/tylenol currently  -has been taking Imitrex but do not find it helpful  -Migraine headaches are occurring about 2-3 times a week. She will occasionally go a week without having one but not often times. Again, tend to manifest more around her headaches.  -Drinks a lot of water. Fairly minimal coffee intake.   -Associated symptoms include sensitivity to light, nausea.   -No neurologic side effects. No thunderclap headaches.    Depression:  -Historically maintained on Wellbutrin for an extended period of time  -Has noticed worsening of mental health over the winter months  -Good support system at home. Tries to stay active.   -Has done talk therapy in the past but did not find success.  -No concerns for self harm or suicide.    Weight:  -Thinks it is related to hormones  -Has not found success in losing weight despite lifestyle adjustments.       History of Present Illness       Mental Health Follow-up:  Patient presents to follow-up on Depression & Anxiety.Patient's depression since last visit has been:  No change  The patient is not having other symptoms associated with depression.  Patient's anxiety since last visit has been:  No change  The patient is not having other symptoms associated with anxiety.  Any significant life events: No  Patient is feeling anxious or having panic attacks.  Patient has no concerns about alcohol or drug use.    Headaches:   Since the patient's last clinic visit, headaches are: worsened  The patient is getting headaches:  Often-4 times a week usually  She is not able to do normal daily activities when she has a migraine.  The patient is taking the following rescue/relief medications:  Ibuprofen (Advil, Motrin) and Tylenol   Patient states \"I get only a small amount of relief\" from the rescue/relief medications.   The patient is taking the following medications to prevent migraines:  No " "medications to prevent migraines  In the past 4 weeks, the patient has gone to an Urgent Care or Emergency Room 0 times times due to headaches.    She eats 2-3 servings of fruits and vegetables daily.She consumes 1 sweetened beverage(s) daily.She exercises with enough effort to increase her heart rate 30 to 60 minutes per day.  She exercises with enough effort to increase her heart rate 5 days per week.   She is taking medications regularly.     Review of Systems         Objective    BP (!) 164/92 (BP Location: Left arm, Patient Position: Sitting, Cuff Size: Adult Large)   Pulse 91   Temp 98.7  F (37.1  C) (Oral)   Resp 16   Ht 1.676 m (5' 6\")   Wt 84.1 kg (185 lb 8 oz)   LMP 01/12/2024 (Exact Date)   SpO2 95%   BMI 29.94 kg/m    Body mass index is 29.94 kg/m .    Physical Exam  Vitals and nursing note reviewed.   Constitutional:       General: She is not in acute distress.     Appearance: Normal appearance.   Cardiovascular:      Rate and Rhythm: Normal rate and regular rhythm.   Pulmonary:      Effort: Pulmonary effort is normal. No respiratory distress.   Neurological:      General: No focal deficit present.      Mental Status: She is alert.      Cranial Nerves: Cranial nerves 2-12 are intact. No cranial nerve deficit or facial asymmetry.      Sensory: Sensation is intact.      Motor: Motor function is intact. No weakness, tremor, abnormal muscle tone or pronator drift.      Coordination: Coordination is intact. Romberg sign negative. Coordination normal.      Gait: Gait is intact.      Deep Tendon Reflexes: Reflexes are normal and symmetric.   Psychiatric:         Mood and Affect: Mood normal.         Behavior: Behavior normal.         Thought Content: Thought content normal.                   "

## 2024-01-16 NOTE — ASSESSMENT & PLAN NOTE
Currently uncontrolled. Based on patient's description of headaches and their frequency, I do believe she is a good candidate for migraine preventative therapy. We discussed options today and after this discussion including propranolol, amitriptyline and Topamax, patient has elected to start with propranolol. Will intiate 20mg TID with a plan to titrate up if patient notices improvement. We discussed medication overuse is also likely playing a role as she is taking ibuprofen/tylenol upwards of 4-5 times a week and she will plan to reduce her use as she is able. She has not found success in Imitrex but is interested in alternative abortive therapy, so will trial Maxalt 5-10mg with the onset of headaches. Expected therapeutic effects and potential side effects were discussed regarding both of these new medications. Patient expressed an understanding of and agreement with this plan. All questions were answered.

## 2024-01-16 NOTE — ASSESSMENT & PLAN NOTE
Patient expresses concern that her weight challenges are related to hormones, as she is also experiencing changes in her menstrual cycles and worsened mood. We were unable to discuss this in depth due to time constraints of our visit and our management of two other chronic conditions, however I have encouraged her to look into [facility] as a resource specific to women's health and hormones. If this workup is unrevealing and patient's mental health improves without improvement in weight, would recommend an office visit to discuss metabolic health and other strategies.

## 2024-01-16 NOTE — ASSESSMENT & PLAN NOTE
Currently uncontrolled. Patient is new to me, but endorses concern related to mental health. This has been an ongoing concern for a number of years. She has completed trials of multiple medications including duloxetine (experienced side effects), escitalopram (did not find it effective/does not remember) and paroxetine (not effective). She has noticed some improvement on Wellbutrin and we discussed it would be reasonable to increase this to 300mg and evaluate efficacy/tolerability. Can always consider referral to talk therapy, but patient declines today. Endorses good support and denies any concern for self harm/suicide. Encouraged her to follow up with any additional needs.

## 2024-05-06 ENCOUNTER — PATIENT OUTREACH (OUTPATIENT)
Dept: CARE COORDINATION | Facility: CLINIC | Age: 44
End: 2024-05-06
Payer: COMMERCIAL

## 2024-05-18 NOTE — TELEPHONE ENCOUNTER
Rochelle burger from pharmacy    buPROPion (WELLBUTRIN XL) 150 MG 24 hr tablet  150 mg, EVERY MORNING 0 ordered         Summary: Take 1 tablet (150 mg) by mouth every morning for 90 days for 2 weeks then increase to 300 mg daily., Disp-90 tablet, R-0, E-Prescribe        Clarified w JOSELITO Babb - pt is taking 150mg daily, no plan to increase to 300 mg. New rx sent w clarification.    Louann Lopes RN on 6/5/2023 at 2:43 PM     show

## 2024-05-20 ENCOUNTER — PATIENT OUTREACH (OUTPATIENT)
Dept: CARE COORDINATION | Facility: CLINIC | Age: 44
End: 2024-05-20
Payer: COMMERCIAL

## 2024-06-07 ENCOUNTER — NURSE TRIAGE (OUTPATIENT)
Dept: NURSING | Facility: CLINIC | Age: 44
End: 2024-06-07
Payer: COMMERCIAL

## 2024-06-07 DIAGNOSIS — G43.711 INTRACTABLE CHRONIC MIGRAINE WITHOUT AURA AND WITH STATUS MIGRAINOSUS: ICD-10-CM

## 2024-06-07 RX ORDER — RIZATRIPTAN BENZOATE 5 MG/1
5-10 TABLET ORAL
Qty: 10 TABLET | Refills: 0 | Status: SHIPPED | OUTPATIENT
Start: 2024-06-07

## 2024-06-08 NOTE — TELEPHONE ENCOUNTER
"Nurse Triage SBAR    Is this a 2nd Level Triage? YES, LICENSED PRACTITIONER REVIEW IS REQUIRED    Situation: Migraine    Background: Pt reports a history of migraines, prescribed Maxalt in January of this year. Pt reports she has run out of Maxalt. Pt rates her current headache \"7-8\". Pt reports she took some ibuprofen and Tylenol \"around 4 pm\" today which did not help. Pt reports Maxalt normally helps.     Assessment: Migraine, med request     Protocol Recommended Disposition:   See HCP Within 4 Hours (Or PCP Triage)    Recommendation: Second level triage      Paged to provider on call Dr. Marlo Rodrigues who advises ok to send in #10 tablets. Pt should be seen if no improvement. Follow up with PCP for further refills.     Provider Recommendation Follow Up:   Reached patient/caregiver. Informed of provider's recommendations. Patient verbalized understanding and agrees with the plan.         Does the patient meet one of the following criteria for ADS visit consideration? 16+ years old, with an [facility] PCP     TIP  Providers, please consider if this condition is appropriate for management at one of our Acute and Diagnostic Services sites.     If patient is a good candidate, please use dotphrase <dot>triageresponse and select Refer to ADS to document.      Reason for Disposition   [1] SEVERE headache (e.g., excruciating) AND [2] not improved after 2 hours of pain medicine    Additional Information   Negative: Difficult to awaken or acting confused (e.g., disoriented, slurred speech)   Negative: [1] Weakness of the face, arm or leg on one side of the body AND [2] new-onset   Negative: [1] Numbness of the face, arm or leg on one side of the body AND [2] new-onset   Negative: [1] Loss of speech or garbled speech AND [2] new-onset   Negative: Passed out (i.e., lost consciousness, collapsed and was not responding)   Negative: Sounds like a life-threatening emergency to the triager   Negative: Followed a head injury   " "Negative: Pregnant   Negative: Postpartum (from 0 to 6 weeks after delivery)   Negative: Traumatic Brain Injury (TBI) is suspected   Negative: Unable to walk, or can only walk with assistance (e.g., requires support)   Negative: Stiff neck (can't touch chin to chest)   Negative: Severe pain in one eye   Negative: [1] Other family members (or people in same household) with headaches AND [2] possibility of carbon monoxide exposure   Negative: [1] SEVERE headache (e.g., excruciating) AND [2] \"worst headache\" of life   Negative: [1] SEVERE headache AND [2] sudden-onset (i.e., reaching maximum intensity within seconds to 1 hour)   Negative: [1] SEVERE headache AND [2] fever   Negative: Loss of vision or double vision  (Exception: Same as prior migraines.)   Negative: [1] Fever > 100.0 F (37.8 C) AND [2] diabetes mellitus or weak immune system (e.g., HIV positive, cancer chemo, splenectomy, organ transplant, chronic steroids)   Negative: Patient sounds very sick or weak to the triager    Protocols used: Headache-A-AH    "

## 2024-09-08 ENCOUNTER — HEALTH MAINTENANCE LETTER (OUTPATIENT)
Age: 44
End: 2024-09-08

## 2025-08-24 ENCOUNTER — HEALTH MAINTENANCE LETTER (OUTPATIENT)
Age: 45
End: 2025-08-24